# Patient Record
Sex: FEMALE | Race: WHITE | NOT HISPANIC OR LATINO | Employment: UNEMPLOYED | URBAN - METROPOLITAN AREA
[De-identification: names, ages, dates, MRNs, and addresses within clinical notes are randomized per-mention and may not be internally consistent; named-entity substitution may affect disease eponyms.]

---

## 2017-05-12 ENCOUNTER — TRANSFERRED RECORDS (OUTPATIENT)
Dept: HEALTH INFORMATION MANAGEMENT | Facility: CLINIC | Age: 27
End: 2017-05-12

## 2017-09-25 ENCOUNTER — TRANSFERRED RECORDS (OUTPATIENT)
Dept: HEALTH INFORMATION MANAGEMENT | Facility: CLINIC | Age: 27
End: 2017-09-25

## 2017-09-27 ENCOUNTER — TRANSFERRED RECORDS (OUTPATIENT)
Dept: HEALTH INFORMATION MANAGEMENT | Facility: CLINIC | Age: 27
End: 2017-09-27

## 2017-10-03 ENCOUNTER — TRANSFERRED RECORDS (OUTPATIENT)
Dept: HEALTH INFORMATION MANAGEMENT | Facility: CLINIC | Age: 27
End: 2017-10-03

## 2018-02-08 ENCOUNTER — TRANSFERRED RECORDS (OUTPATIENT)
Dept: HEALTH INFORMATION MANAGEMENT | Facility: CLINIC | Age: 28
End: 2018-02-08

## 2019-02-05 ENCOUNTER — TRANSFERRED RECORDS (OUTPATIENT)
Dept: HEALTH INFORMATION MANAGEMENT | Facility: CLINIC | Age: 29
End: 2019-02-05

## 2019-02-12 ENCOUNTER — MEDICAL CORRESPONDENCE (OUTPATIENT)
Dept: HEALTH INFORMATION MANAGEMENT | Facility: CLINIC | Age: 29
End: 2019-02-12

## 2019-06-20 ENCOUNTER — OFFICE VISIT (OUTPATIENT)
Dept: NEUROLOGY | Facility: CLINIC | Age: 29
End: 2019-06-20
Payer: COMMERCIAL

## 2019-06-20 ENCOUNTER — ALLIED HEALTH/NURSE VISIT (OUTPATIENT)
Dept: NEUROLOGY | Facility: CLINIC | Age: 29
End: 2019-06-20
Payer: COMMERCIAL

## 2019-06-20 VITALS — SYSTOLIC BLOOD PRESSURE: 143 MMHG | WEIGHT: 162.2 LBS | HEART RATE: 103 BPM | DIASTOLIC BLOOD PRESSURE: 92 MMHG

## 2019-06-20 DIAGNOSIS — G40.109 FOCAL EPILEPSY (H): Primary | ICD-10-CM

## 2019-06-20 RX ORDER — LEVETIRACETAM 500 MG/1
TABLET ORAL
Qty: 90 TABLET | Refills: 3 | Status: SHIPPED | OUTPATIENT
Start: 2019-06-20 | End: 2019-10-07

## 2019-06-20 ASSESSMENT — PAIN SCALES - GENERAL: PAINLEVEL: NO PAIN (0)
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
## 2019-06-20 NOTE — PROGRESS NOTES
CT: 84106-68  [facility]hr VEEG   [facility]  Dr Don long
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
## 2019-07-11 NOTE — PROCEDURES
Procedure Date: 2019      VIDEO EEG #:  [id number]      DATE OF RECORDIN2019      SOURCE FILE DURATION:  3 hours 1 minute 54 seconds      CLINICAL SUMMARY:  The patient is a 29-year-old female with a history of alcohol abuse who was referred for evaluation of seizure-like activities.  Video EEG was performed to evaluate for seizures.     TECHNICAL SUMMARY: This continuous video- EEG monitoring procedure was performed with 23 scalp electrodes in 10-20 electrode system placement, and additional scalp, precordial and other surface electrodes used for electrical referencing and artifact detection.  Video monitoring was utilized and periodically reviewed by EEG technologists and the physician for electroclinical correlations.     INTERICTAL ACTIVITY:  During maximal wakefulness, there was 9 Hz alpha activity over the posterior head regions, which was nonsustained and was mixed with a lot of theta slowing.  During waking, there was excessive focal polymorphic delta slowing over the left temporal region, which at times was sharply contoured.  Rare sharp transients were also seen over the left temporal region.  Stage II sleep was manifested as vertex waves and symmetric sleep spindles.      ACTIVATION MANEUVERS:  Photic stimulation did not induce an abnormal activity on the EEG.  Hyperventilation induced moderate slowing of the background.      CLINICAL/ICTAL EVENTS:  No electrographic seizures or target clinical events were recorded.      IMPRESSION:  This is an abnormal video EEG due to the presence of diffuse theta slowing during waking and focal left temporal delta slowing, which at times was sharply contoured, and rare poorly formed sharp transients over the left temporal head region, which indicated mild diffuse encephalopathy with an additional structural abnormality over the left temporal region.  Sharp transients were poorly formed, but could possibly be epileptogenic in the right clinical setting.   Clinical correlation advised.         BALJINDER CORONADO MD             D: 2019   T: 2019   MT: [id number]      Name:     ANTWON CLIFTON   MRN:      [id number]        Account:        [id number]   :      1990           Procedure Date: 2019      Document: [id number]

## 2019-07-11 NOTE — PROCEDURES
Procedure Date: 2019      VIDEO EEG:  [id number]      DATE OF RECORDIN2019         BALJINDER CORONADO MD             D: 2019   T: 2019   MT: sosa      Name:     ANTWON CLIFTON   MRN:      [id number]        Account:        [id number]   :      1990           Procedure Date: 2019      Document: [id number]

## 2019-10-07 DIAGNOSIS — G40.109 FOCAL EPILEPSY (H): ICD-10-CM

## 2019-10-07 NOTE — TELEPHONE ENCOUNTER
Rx Authorization:    Requested Medication/ Dose levETIRAcetam (KEPPRA) 500 MG tablet    Date last refill ordered: 06/20/19    Quantity ordered: 90    # refills: 3    Date of last clinic visit with ordering provider: 06/20/19    Date of next clinic visit with ordering provider: None Scheduled     All pertinent protocol data (lab date/result):     Include pertinent information from patients message:

## 2019-10-08 RX ORDER — LEVETIRACETAM 500 MG/1
TABLET ORAL
Qty: 90 TABLET | Refills: 0 | Status: ON HOLD | OUTPATIENT
Start: 2019-10-08 | End: 2020-01-20

## 2020-01-13 ENCOUNTER — PATIENT OUTREACH (OUTPATIENT)
Dept: NEUROLOGY | Facility: CLINIC | Age: 30
End: 2020-01-13

## 2020-01-13 NOTE — TELEPHONE ENCOUNTER
"Pre VEEG admission call    Per MD note  \"ASSESSMENT:  A 29-year-old, right-handed woman with a history of seizures, probable focal seizures without and with secondary generalization. Has an aura of weird smell, sometimes followed by confusion and rare convulsions\"    Patient was last seen in June 2019, at which time an inpatient VEEG monitoring session was ordered.  Patient is to be admitted for seizure characterization and diagnostic clarification.    Call placed to patient.  She is aware of the admission, and is planning on the admission  She has watched the admission DVD. She understands this could be 7 days or longer (or shorter)    We reviewed some of the routines, including that she will need assistance of a hospital staff member whenever she is not in bed, or in a sturdy chair  I explained that she will have staff accompany her to the bathroom,k and stay with her while she is in the bathroom. We discussed that it is okay for her to ask for female staff if this makes her more comfortable.    Patient denies nicotine or tobacco use.     will provide transportation    No other questions at this time    Patient was instructed to call if she has questions or concerns, or if there are any issues attending the appointment on time      "

## 2020-01-15 ENCOUNTER — HOSPITAL ENCOUNTER (INPATIENT)
Facility: CLINIC | Age: 30
LOS: 5 days | Discharge: HOME | End: 2020-01-20
Attending: PSYCHIATRY & NEUROLOGY | Admitting: PSYCHIATRY & NEUROLOGY
Payer: COMMERCIAL

## 2020-01-15 ENCOUNTER — ALLIED HEALTH/NURSE VISIT (OUTPATIENT)
Dept: NEUROLOGY | Facility: CLINIC | Age: 30
End: 2020-01-15
Attending: PSYCHIATRY & NEUROLOGY
Payer: COMMERCIAL

## 2020-01-15 DIAGNOSIS — G40.109 FOCAL EPILEPSY (H): Primary | ICD-10-CM

## 2020-01-15 DIAGNOSIS — F10.988 ALCOHOL USE, UNSPECIFIED WITH OTHER ALCOHOL-INDUCED DISORDER (H): Primary | ICD-10-CM

## 2020-01-15 DIAGNOSIS — G40.109 FOCAL EPILEPSY (H): ICD-10-CM

## 2020-01-15 PROBLEM — G40.909 EPILEPSY (H): Status: ACTIVE | Noted: 2020-01-15

## 2020-01-15 LAB
ALBUMIN SERPL-MCNC: 2.6 G/DL (ref 3.4–5)
ALP SERPL-CCNC: 230 U/L (ref 40–150)
ALT SERPL W P-5'-P-CCNC: 72 U/L (ref 0–50)
ANION GAP SERPL CALCULATED.3IONS-SCNC: 9 MMOL/L (ref 3–14)
AST SERPL W P-5'-P-CCNC: 264 U/L (ref 0–45)
B-HCG SERPL-ACNC: <1 IU/L (ref 0–5)
BILIRUB SERPL-MCNC: 1.6 MG/DL (ref 0.2–1.3)
BUN SERPL-MCNC: 4 MG/DL (ref 7–30)
CALCIUM SERPL-MCNC: 7.8 MG/DL (ref 8.5–10.1)
CHLORIDE SERPL-SCNC: 101 MMOL/L (ref 94–109)
CO2 SERPL-SCNC: 24 MMOL/L (ref 20–32)
CREAT SERPL-MCNC: 0.37 MG/DL (ref 0.52–1.04)
ERYTHROCYTE [DISTWIDTH] IN BLOOD BY AUTOMATED COUNT: 14.3 % (ref 10–15)
GFR SERPL CREATININE-BSD FRML MDRD: >90 ML/MIN/{1.73_M2}
GLUCOSE SERPL-MCNC: 87 MG/DL (ref 70–99)
HCT VFR BLD AUTO: 29.5 % (ref 35–47)
HGB BLD-MCNC: 9.8 G/DL (ref 11.7–15.7)
INR PPP: 1.63 (ref 0.86–1.14)
MCH RBC QN AUTO: 37.4 PG (ref 26.5–33)
MCHC RBC AUTO-ENTMCNC: 33.2 G/DL (ref 31.5–36.5)
MCV RBC AUTO: 113 FL (ref 78–100)
PLATELET # BLD AUTO: 150 10E9/L (ref 150–450)
POTASSIUM SERPL-SCNC: 3.1 MMOL/L (ref 3.4–5.3)
PROT SERPL-MCNC: 6.5 G/DL (ref 6.8–8.8)
RBC # BLD AUTO: 2.62 10E12/L (ref 3.8–5.2)
SODIUM SERPL-SCNC: 134 MMOL/L (ref 133–144)
WBC # BLD AUTO: 7.2 10E9/L (ref 4–11)

## 2020-01-15 PROCEDURE — 12000012 ZZH R&B MS OVERFLOW UMMC

## 2020-01-15 PROCEDURE — 80053 COMPREHEN METABOLIC PANEL: CPT | Performed by: PHYSICIAN ASSISTANT

## 2020-01-15 PROCEDURE — 99222 1ST HOSP IP/OBS MODERATE 55: CPT | Performed by: STUDENT IN AN ORGANIZED HEALTH CARE EDUCATION/TRAINING PROGRAM

## 2020-01-15 PROCEDURE — 95714 VEEG EA 12-26 HR UNMNTR: CPT | Mod: ZF

## 2020-01-15 PROCEDURE — 99207 ZZC CONSULT E&M CHANGED TO INITIAL LEVEL: CPT | Performed by: STUDENT IN AN ORGANIZED HEALTH CARE EDUCATION/TRAINING PROGRAM

## 2020-01-15 PROCEDURE — 25000132 ZZH RX MED GY IP 250 OP 250 PS 637: Performed by: PHYSICIAN ASSISTANT

## 2020-01-15 PROCEDURE — 36415 COLL VENOUS BLD VENIPUNCTURE: CPT | Performed by: PHYSICIAN ASSISTANT

## 2020-01-15 PROCEDURE — 40000556 ZZH STATISTIC PERIPHERAL IV START W US GUIDANCE

## 2020-01-15 PROCEDURE — 85027 COMPLETE CBC AUTOMATED: CPT | Performed by: PHYSICIAN ASSISTANT

## 2020-01-15 PROCEDURE — 84702 CHORIONIC GONADOTROPIN TEST: CPT | Performed by: PHYSICIAN ASSISTANT

## 2020-01-15 PROCEDURE — 85610 PROTHROMBIN TIME: CPT | Performed by: PHYSICIAN ASSISTANT

## 2020-01-15 PROCEDURE — HZ2ZZZZ DETOXIFICATION SERVICES FOR SUBSTANCE ABUSE TREATMENT: ICD-10-PCS | Performed by: STUDENT IN AN ORGANIZED HEALTH CARE EDUCATION/TRAINING PROGRAM

## 2020-01-15 PROCEDURE — 80177 DRUG SCRN QUAN LEVETIRACETAM: CPT | Performed by: PHYSICIAN ASSISTANT

## 2020-01-15 RX ORDER — LIDOCAINE 40 MG/G
CREAM TOPICAL
Status: DISCONTINUED | OUTPATIENT
Start: 2020-01-15 | End: 2020-01-20 | Stop reason: HOSPADM

## 2020-01-15 RX ORDER — LORAZEPAM 1 MG/1
1 TABLET ORAL EVERY 6 HOURS PRN
COMMUNITY

## 2020-01-15 RX ORDER — LANOLIN ALCOHOL/MO/W.PET/CERES
100 CREAM (GRAM) TOPICAL DAILY
Status: DISCONTINUED | OUTPATIENT
Start: 2020-01-16 | End: 2020-01-20 | Stop reason: HOSPADM

## 2020-01-15 RX ORDER — POTASSIUM CHLORIDE 1.5 G/1.58G
20-40 POWDER, FOR SOLUTION ORAL
Status: DISCONTINUED | OUTPATIENT
Start: 2020-01-15 | End: 2020-01-18

## 2020-01-15 RX ORDER — POTASSIUM CHLORIDE 750 MG/1
20-40 TABLET, EXTENDED RELEASE ORAL
Status: DISCONTINUED | OUTPATIENT
Start: 2020-01-15 | End: 2020-01-18

## 2020-01-15 RX ORDER — LORAZEPAM 2 MG/ML
2 INJECTION INTRAMUSCULAR
Status: DISCONTINUED | OUTPATIENT
Start: 2020-01-15 | End: 2020-01-20 | Stop reason: HOSPADM

## 2020-01-15 RX ORDER — MULTIVITAMIN,THERAPEUTIC
1 TABLET ORAL DAILY
Status: DISCONTINUED | OUTPATIENT
Start: 2020-01-15 | End: 2020-01-20 | Stop reason: HOSPADM

## 2020-01-15 RX ORDER — ACETAMINOPHEN 325 MG/1
650 TABLET ORAL EVERY 4 HOURS PRN
Status: DISCONTINUED | OUTPATIENT
Start: 2020-01-15 | End: 2020-01-16

## 2020-01-15 RX ORDER — DOCUSATE SODIUM 100 MG/1
100 CAPSULE, LIQUID FILLED ORAL 2 TIMES DAILY PRN
Status: DISCONTINUED | OUTPATIENT
Start: 2020-01-15 | End: 2020-01-20 | Stop reason: HOSPADM

## 2020-01-15 RX ORDER — FOLIC ACID 1 MG/1
1 TABLET ORAL DAILY
Status: DISCONTINUED | OUTPATIENT
Start: 2020-01-15 | End: 2020-01-20 | Stop reason: HOSPADM

## 2020-01-15 RX ORDER — LIDOCAINE HYDROCHLORIDE 20 MG/ML
5 SOLUTION OROPHARYNGEAL EVERY 6 HOURS PRN
Status: DISCONTINUED | OUTPATIENT
Start: 2020-01-15 | End: 2020-01-20 | Stop reason: HOSPADM

## 2020-01-15 RX ORDER — GINSENG 100 MG
CAPSULE ORAL 3 TIMES DAILY PRN
Status: DISCONTINUED | OUTPATIENT
Start: 2020-01-15 | End: 2020-01-20 | Stop reason: HOSPADM

## 2020-01-15 RX ORDER — LEVETIRACETAM 500 MG/1
500 TABLET ORAL AT BEDTIME
Status: COMPLETED | OUTPATIENT
Start: 2020-01-15 | End: 2020-01-15

## 2020-01-15 RX ADMIN — THERA TABS 1 TABLET: TAB at 15:55

## 2020-01-15 RX ADMIN — POTASSIUM CHLORIDE 20 MEQ: 750 TABLET, EXTENDED RELEASE ORAL at 22:02

## 2020-01-15 RX ADMIN — POTASSIUM CHLORIDE 40 MEQ: 750 TABLET, EXTENDED RELEASE ORAL at 15:55

## 2020-01-15 RX ADMIN — LEVETIRACETAM 500 MG: 500 TABLET ORAL at 22:02

## 2020-01-15 RX ADMIN — FOLIC ACID 1 MG: 1 TABLET ORAL at 15:55

## 2020-01-15 RX ADMIN — ACETAMINOPHEN 650 MG: 325 TABLET, FILM COATED ORAL at 15:55

## 2020-01-15 ASSESSMENT — PAIN DESCRIPTION - DESCRIPTORS: DESCRIPTORS: ACHING;HEADACHE

## 2020-01-15 ASSESSMENT — ACTIVITIES OF DAILY LIVING (ADL)
ADLS_ACUITY_SCORE: 10
ADLS_ACUITY_SCORE: 10
ADLS_ACUITY_SCORE: 12

## 2020-01-15 NOTE — PROGRESS NOTES
Patient admitted to: 5402   Admitted from: home  Arrived by: personal vehicle  Reason for admission: seizure study  Patient accompanied by: significant other  Belongings: kept with patient  Teaching: oriented to room and monitoring

## 2020-01-15 NOTE — PLAN OF CARE
Vitals stable,alert and oriented x 4,Pupils equal and reactive to light briskly.Moves all extremities equally.Denied numbness or tingling..,Patient seen by provider,orders written.Patient was started on Video EEG  monitoring at 1130.No seizure activity noted or  reported by patient.Up with  standby assist and gait belt.Bed alarm activated  for safety.Patient was instructed to call for help if she  needs to get out of bed..Denied pain or any discomfort.Continue to monitor per plan of care
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
## 2020-01-16 ENCOUNTER — ALLIED HEALTH/NURSE VISIT (OUTPATIENT)
Dept: NEUROLOGY | Facility: CLINIC | Age: 30
End: 2020-01-16
Attending: PSYCHIATRY & NEUROLOGY
Payer: COMMERCIAL

## 2020-01-16 ENCOUNTER — APPOINTMENT (OUTPATIENT)
Dept: ULTRASOUND IMAGING | Facility: CLINIC | Age: 30
End: 2020-01-16
Attending: STUDENT IN AN ORGANIZED HEALTH CARE EDUCATION/TRAINING PROGRAM
Payer: COMMERCIAL

## 2020-01-16 DIAGNOSIS — G40.109 FOCAL EPILEPSY (H): Primary | ICD-10-CM

## 2020-01-16 LAB
ALBUMIN SERPL-MCNC: 2.1 G/DL (ref 3.4–5)
ALP SERPL-CCNC: 197 U/L (ref 40–150)
ALT SERPL W P-5'-P-CCNC: 61 U/L (ref 0–50)
ANION GAP SERPL CALCULATED.3IONS-SCNC: 5 MMOL/L (ref 3–14)
AST SERPL W P-5'-P-CCNC: 220 U/L (ref 0–45)
BILIRUB SERPL-MCNC: 2.2 MG/DL (ref 0.2–1.3)
BUN SERPL-MCNC: 6 MG/DL (ref 7–30)
CALCIUM SERPL-MCNC: 7.2 MG/DL (ref 8.5–10.1)
CHLORIDE SERPL-SCNC: 102 MMOL/L (ref 94–109)
CO2 SERPL-SCNC: 27 MMOL/L (ref 20–32)
CREAT SERPL-MCNC: 0.36 MG/DL (ref 0.52–1.04)
GFR SERPL CREATININE-BSD FRML MDRD: >90 ML/MIN/{1.73_M2}
GLUCOSE SERPL-MCNC: 91 MG/DL (ref 70–99)
INR PPP: 1.85 (ref 0.86–1.14)
LEVETIRACETAM SERPL-MCNC: 17 UG/ML (ref 12–46)
MAGNESIUM SERPL-MCNC: 1.7 MG/DL (ref 1.6–2.3)
POTASSIUM SERPL-SCNC: 3.4 MMOL/L (ref 3.4–5.3)
PROT SERPL-MCNC: 5.7 G/DL (ref 6.8–8.8)
SODIUM SERPL-SCNC: 135 MMOL/L (ref 133–144)

## 2020-01-16 PROCEDURE — 99233 SBSQ HOSP IP/OBS HIGH 50: CPT | Performed by: INTERNAL MEDICINE

## 2020-01-16 PROCEDURE — 85610 PROTHROMBIN TIME: CPT | Performed by: INTERNAL MEDICINE

## 2020-01-16 PROCEDURE — 83735 ASSAY OF MAGNESIUM: CPT | Performed by: INTERNAL MEDICINE

## 2020-01-16 PROCEDURE — 12000012 ZZH R&B MS OVERFLOW UMMC

## 2020-01-16 PROCEDURE — 36415 COLL VENOUS BLD VENIPUNCTURE: CPT | Performed by: NURSE PRACTITIONER

## 2020-01-16 PROCEDURE — 95714 VEEG EA 12-26 HR UNMNTR: CPT | Mod: ZF

## 2020-01-16 PROCEDURE — 76705 ECHO EXAM OF ABDOMEN: CPT

## 2020-01-16 PROCEDURE — 36415 COLL VENOUS BLD VENIPUNCTURE: CPT | Performed by: INTERNAL MEDICINE

## 2020-01-16 PROCEDURE — 25000132 ZZH RX MED GY IP 250 OP 250 PS 637: Performed by: PHYSICIAN ASSISTANT

## 2020-01-16 PROCEDURE — 25000128 H RX IP 250 OP 636: Performed by: NURSE PRACTITIONER

## 2020-01-16 PROCEDURE — 84145 PROCALCITONIN (PCT): CPT | Performed by: NURSE PRACTITIONER

## 2020-01-16 PROCEDURE — 25000132 ZZH RX MED GY IP 250 OP 250 PS 637: Performed by: NURSE PRACTITIONER

## 2020-01-16 PROCEDURE — 25000132 ZZH RX MED GY IP 250 OP 250 PS 637: Performed by: STUDENT IN AN ORGANIZED HEALTH CARE EDUCATION/TRAINING PROGRAM

## 2020-01-16 PROCEDURE — 99207 ZZC APP CREDIT; MD BILLING SHARED VISIT: CPT | Performed by: PHYSICIAN ASSISTANT

## 2020-01-16 PROCEDURE — 86803 HEPATITIS C AB TEST: CPT | Performed by: INTERNAL MEDICINE

## 2020-01-16 PROCEDURE — 87340 HEPATITIS B SURFACE AG IA: CPT | Performed by: INTERNAL MEDICINE

## 2020-01-16 PROCEDURE — 87040 BLOOD CULTURE FOR BACTERIA: CPT | Performed by: NURSE PRACTITIONER

## 2020-01-16 PROCEDURE — 80053 COMPREHEN METABOLIC PANEL: CPT | Performed by: INTERNAL MEDICINE

## 2020-01-16 RX ORDER — PHYTONADIONE 5 MG/1
5 TABLET ORAL ONCE
Status: COMPLETED | OUTPATIENT
Start: 2020-01-16 | End: 2020-01-16

## 2020-01-16 RX ORDER — CEFTRIAXONE 1 G/1
1 INJECTION, POWDER, FOR SOLUTION INTRAMUSCULAR; INTRAVENOUS EVERY 24 HOURS
Status: DISCONTINUED | OUTPATIENT
Start: 2020-01-16 | End: 2020-01-20

## 2020-01-16 RX ORDER — ACETAMINOPHEN 325 MG/1
325 TABLET ORAL EVERY 6 HOURS PRN
Status: DISCONTINUED | OUTPATIENT
Start: 2020-01-16 | End: 2020-01-20 | Stop reason: HOSPADM

## 2020-01-16 RX ADMIN — ACETAMINOPHEN 325 MG: 325 TABLET, FILM COATED ORAL at 23:25

## 2020-01-16 RX ADMIN — OMEPRAZOLE 20 MG: 20 CAPSULE, DELAYED RELEASE ORAL at 17:05

## 2020-01-16 RX ADMIN — THERA TABS 1 TABLET: TAB at 08:53

## 2020-01-16 RX ADMIN — PHYTONADIONE 5 MG: 5 TABLET ORAL at 13:43

## 2020-01-16 RX ADMIN — ACETAMINOPHEN 650 MG: 325 TABLET, FILM COATED ORAL at 08:53

## 2020-01-16 RX ADMIN — CEFTRIAXONE 1 G: 1 INJECTION, POWDER, FOR SOLUTION INTRAMUSCULAR; INTRAVENOUS at 23:25

## 2020-01-16 RX ADMIN — Medication 100 MG: at 08:53

## 2020-01-16 RX ADMIN — FOLIC ACID 1 MG: 1 TABLET ORAL at 08:53

## 2020-01-16 ASSESSMENT — PAIN DESCRIPTION - DESCRIPTORS
DESCRIPTORS: ACHING;DISCOMFORT
DESCRIPTORS: ACHING;SORE
DESCRIPTORS: ACHING

## 2020-01-16 ASSESSMENT — ACTIVITIES OF DAILY LIVING (ADL)
ADLS_ACUITY_SCORE: 10

## 2020-01-16 NOTE — PLAN OF CARE
/68 (BP Location: Right arm)   Pulse 94   Temp 98.5  F (36.9  C) (Oral)   Resp 16   Wt 70.9 kg (156 lb 4.9 oz)   SpO2 96%    VSS, afebrile. AxOx4. Complaint of headache and abdominal pain. Tylenol given x1 with relief. Good motor strength, no numbness/tingling, reactive pupils. EEG monitoring, pt pressed button once while eating dinner due to a weird smell which may be an aura. Pt stated that it went away and no seizure activity noted. Bed alarm activated. Use of gait belt while ambulating. Pt calls appropriately. 60 mEq of K+ replaced, recheck scheduled for 0200. Will have an abdominal scan tomorrow, NPO @ Midnight. Peripheral in R forearm, saline locked. Continue with POC.    Problem: Adult Inpatient Plan of Care  Goal: Plan of Care Review  1/15/2020 2313 by Narayan Giles, RN  Outcome: No Change     Problem: Adult Inpatient Plan of Care  Goal: Patient-Specific Goal (Individualization)  1/15/2020 2313 by Narayan Giles, RN  Outcome: No Change     Problem: Adult Inpatient Plan of Care  Goal: Absence of Hospital-Acquired Illness or Injury  1/15/2020 2313 by Narayan Giles, RN  Outcome: No Change     Problem: Adult Inpatient Plan of Care  Goal: Optimal Comfort and Wellbeing  1/15/2020 2313 by Narayan Giles, RN  Outcome: No Change     Problem: Seizure, Active Management  Goal: Absence of Seizure/Seizure-Related Injury  1/15/2020 2313 by Narayan Giles, RN  Outcome: No Change

## 2020-01-16 NOTE — PLAN OF CARE
. Tmax 100.2. Denies pain and nausea. MSSA score 5. CIWA score 0. Neuro checks normal. No s/s of seizures tonight. Bed alarm on for safety. EEG continues. NPO since midnight for abdominal ultrasound today. No replacements needed this am.     Problem: Adult Inpatient Plan of Care  Goal: Plan of Care Review  1/16/2020 0708 by Elida Packer RN  Outcome: No Change     Problem: Adult Inpatient Plan of Care  Goal: Optimal Comfort and Wellbeing  1/16/2020 0708 by Elida Packer, RN  Outcome: No Change     Problem: Seizure, Active Management  Goal: Absence of Seizure/Seizure-Related Injury  1/16/2020 0708 by Elida Packer, RN  Outcome: No Change

## 2020-01-16 NOTE — PROGRESS NOTES
"CLINICAL NUTRITION SERVICES - ASSESSMENT NOTE     Nutrition Prescription    Malnutrition Status:    Patient does not meet two of the established criteria necessary for diagnosing malnutrition    Recommendations already ordered by Registered Dietitian (RD):  None at this time    Future/additional Recommendations  -Monitor engagement in disordered eating patterns  -Monitor wt trends (consider blinded wts if pt seems upset by this)     REASON FOR ASSESSMENT  Kota Cowan is a/an 29 year old female assessed by the dietitian for Admission Nutrition Risk Screen for reduced oral intake over the last month    Per H&P: \"PMH that is pertinent for alcohol abuse, alcoholic pancreatitis, seizures who was admitted for overnight EEG and was found to have elevated LFTs. Medicine were consulted to assist with work up...Alcohol abuse, currently reports 4 alcoholic beverages a day...Eating disorder (restricting and purging).\"    NUTRITION HISTORY  Pt shares she has struggled with disordered eating behaviors (combination of binging/purging and restrictive eating) since she was a teenager. Over the years, her disordered eating patterns have decreased in frequency but it remains nearly a daily struggle. She shares she reached down to 100 lbs a little over a year ago, but she has been working and gained weight. She feels her healthiest at 135 lbs. Pt shares she likes trout (her  fishes), steak and eggs. She mentioned she likes IPA beer, but did not mention anything about drinking other than this. Pt enjoys salty foods.     CURRENT NUTRITION ORDERS  Diet: Regular  Intake/Tolerance: 50% of two meals since admission per RN flowsheets    LABS  Labs reviewed (1/17 unless otherwise noted)  -K+ 3.1 (L)  -BUN 4 (L) indicative of recent poor protein intake  -Cr 0.37 (L) may be indicative of low LBM  -Mg++ 1.7 (WNL) on 1/16    MEDICATIONS  Medications reviewed  -Folvite  -Thera-vit  -Thiamine 100 mg   -PRN bowel " "meds    ANTHROPOMETRICS  Height: 0 cm (Data Unavailable)  Ht Readings from Last 2 Encounters:   No data found for Ht   Per Care Everywhere:  167.6 cm (5' 6\") 04/25/2019 11:27 AM CDT   Most Recent Weight: 70.9 kg (156 lb 4.9 oz)   UBW: ~145-155 lbs over past year per pt    IBW: 59.1 kg (120% IBW)  BMI: 25.24 kg/m2; Overweight BMI 25-29.9  Weight History: Wt appears fairly stable compared to limited wt history below. Current wt is similar to weights back in April of last year. Pt shares she has gained wt over past year or so (used to weigh 100 lbs).  Wt Readings from Last 20 Encounters:   01/15/20 70.9 kg (156 lb 4.9 oz)   06/20/19 73.6 kg (162 lb 3.2 oz)     Per Care Everywhere:  70.4 kg (155 lb 1.6 oz) 04/25/2019 11:27 AM CDT     68.3 kg (150 lb 8 oz) 04/24/2019 9:37 AM CDT     72.6 kg (160 lb) 04/17/2019 9:23 AM CDT     Dosing Weight: 62 kg (adjusted, based on admit wt of 70.9 kg on 1/15 and IBW of 59.1 kg)    ASSESSED NUTRITION NEEDS  Estimated Energy Needs: 2464-2319 kcals/day (25 - 30 kcals/kg)  Justification: Maintenance  Estimated Protein Needs: 74-93 grams protein/day (1.2 - 1.5 grams of pro/kg)  Justification: Hypercatabolism with acute illness  Estimated Fluid Needs: 1 mL/kcal   Justification: Maintenance or Per provider pending fluid status    PHYSICAL FINDINGS  See malnutrition section below.  -Hair, skin, nails WNL    MALNUTRITION  % Intake: Decreased intake does not meet criteria  % Weight Loss: None noted - pt shares wt has been stable  Subcutaneous Fat Loss: None observed  Muscle Loss: None observed  Fluid Accumulation/Edema: Mild (1+ trace anasarca and 2+ bipedal edema per RN flowsheets)  Malnutrition Diagnosis: Patient does not meet two of the established criteria necessary for diagnosing malnutrition    NUTRITION DIAGNOSIS  Inadequate oral intake related to poor appetite in setting of pain (now improving) and ongoing struggle with disordered eating behaviors as evidenced by pt report of often " struggling with a combination of both binging/purging and restrictive eating behaviors but is overall improved from her teenage years.    INTERVENTIONS  Implementation  Nutrition Education: Provided education on RD role, role of NFPE. Discussed importance of adequate protein intake to maintain muscle, hair, and overall health. Encouraged pt to choose foods she enjoys and avoid triggers for disordered eating behaviors. Pt knows triggers and activities that help avoid disordered eating behaviors.    Goals  Patient to consume % of nutritionally adequate meal trays TID, or the equivalent with supplements/snacks.     Monitoring/Evaluation  Progress toward goals will be monitored and evaluated per protocol.    Rocio Fajardo RD, LD  Pager: [phone]

## 2020-01-16 NOTE — PROGRESS NOTES
[facility], [place]   Epilepsy Service Daily Note      Interval History:   Had one event of weird smell but this did not progress.  No major complaints.     Review of System:   No nausea, No vomiting, no headaches, no dizziness, no chest pain.     Medications:   Antiepileptic Medications Home Doses: levetiracetam 500-1000  Antiepileptic Medications Current Doses: none     Exam: Blood pressure 118/76, pulse 86, temperature 99.4  F (37.4  C), temperature source Oral, resp. rate 18, weight 71.5 kg (157 lb 9.6 oz), SpO2 97 %.   General: NAD  Head: NC/AT  Abdomen: soft, mild tenderness, not significantly distended   Neuro: Alert and oriented. Speech fluent. Hearing intact to normal conversation. Pupils equal, round and reactive to light. EOM's intact. VFF. Face symmetric, tongue midline. Strength 5/5 bilaterally. No drift or pronation. Intact FNF. Sensation grossly intact to light touch.   EEG: no epileptiform discharges   Assessment and Plan: patient seen and discussed with Dr. Canada   1. Patient is a 29 year old, right-handed female with a history of alcohol abuse, alcohol induced pancreatitis and hepatitis as well as anemia who is being admitted for characterization of seizures. One target spell with weird smell but this did not progress. No electrographic seizures yet. Did discuss with medicine team continuing elective vEEG monitoring to record seizure for characterization given other chronic issues and they are comfortable with proceeding. Patient would also like to proceed.     -continue vEEG monitoring  -continue off levetiracetam  -sleep deprive tonight  -continues to decline psychiatry consult or chemical dependency referral at this time       2. Alcohol abuse with abnormal LFTs, increased INR most likely due to alcoholic hepatitis. RUQ ultrasound suggets hepatic steatosis with moderate amount of abdominal ascites. Abdominal pain felt likely consistent with etoh induced gastritis.  Omeprazole BID started.     -continue management per medicine team   -continue MSSA (last alcoholic beverage 1/14)      Ally Lorenzo PA-C    Total time: 25 minute was spent in the care of this patient. The patient agrees with the above mentioned plan of care. I answered all the patient's questions and addressed immediate concerns. More than 50% of time spent consisted of counseling and coordinating care, including discussion of the diagnostic significance of EEG findings, anti-seizure medication management, and planning for discharge home

## 2020-01-16 NOTE — CONSULTS
"[facility], [place]    Internal Medicine Consult - Gold Service       Date of Admission:  1/15/2020  Consult Requested by: Epilepsy   Reason for Consult: elevated LFTs     Assessment & Plan   Kota Cowan is a 29 year old female with PMH that is pertinent for alcohol abuse, alcoholic pancreatitis, seizures who was admitted for overnight EEG and was found to have elevated LFTs. Medicine were consulted to assist with work up.     # Elevated LFTs likely 2/2 Alcoholic hepatitis   AST:ALT ratio consistent with alcoholic hepatitis. Her Maddrey`s coefficient is 28.7 favoring good prognosis . Would do conservative management.     - Obtain RUQ US to r/o other causes   - Trend CMP/INR daily  - Check Magnesium levels   - Start Thiamine , Folate daily for alcohol dependance  - Agree with [facility] protocol   - Discussed with patient medicine for alcohol dependence and assistance from psychiatry. She is refusing at this point \"will do it on my own, do not want extra medicine\". If patient changes her mind and is agreeable would do the following:  * Start Gabapentin 600 mg TID   * Consider Psychiatry , Chemical dependency     #Right Pleural effusion ?   Exam c/w right pleural effusion.   - Obtain CXR     #Hypokalemia   Agree with 40 meq of potassium chloride     #Epilepsy   Per primary team. On Keppra    DVT Prophylaxis: Low Risk/Ambulatory with no VTE prophylaxis indicated    Thank you for this interesting consult. Medicine will continue to follow along in the AM.     Gaurav Senior  Internal Medicine Staff Hospitalist Service  [facility]  Pager: [phone]  After 5 PM, page gold team cross cover at 2021. If no response, page job code [phone].  ______________________________________________________________________    Chief Complaint   Abdominal pain     History is obtained from the patient    History of Present Illness   Kota Cowan is a 29 year old female with PMH that is " "pertinent for alcohol abuse, alcoholic pancreatitis, seizures who was admitted for overnight EEG and was found to have elevated LFTs. Medicine were consulted to assist with work up.     Patient was admitted for a scheduled overnight EEG and was found to have elevated LFTs.  Patient reports that starting last week she started having abdominal pain. Pain is epigastric , radiates to RUQ. Sharp in nature. Associated with alcohol use. Has chronic nausea and had two episodes of NBNB vomiting. Chronic diarrhea with fatty food that is unchanged. No fevers, chills , sick contacts.   She drinks about 1-2 small bottles of hard liquor along with small box of wine and beer daily. She 7/10 motivated to cut down her alcohol use. Her motivation is to be pain free. She was seen by psychiatry before but \"didn't like it\". She has a primary care that she has good relation ship with.     Review of Systems   The 10 point Review of Systems is negative other than noted in the HPI    Past Medical History    I have reviewed this patient's medical history and updated it with pertinent information if needed.   Epilepsy  Alcoholic hepatitis  Alcoholic pancreatitis     Past Surgical History   I have reviewed this patient's surgical history and updated it with pertinent information if needed.  No past surgical history on file.     Social History   Previous smoker.   Alcohol as above. No drug use.    has a boy and a girl , age 7 and 10.  is mild drinker. Supportive of her quitting.     Family History   I have reviewed this patient's family history and updated it with pertinent information if needed.   No family history on file.    Medications   I have reviewed this patient's current medications    Allergies   Allergies   Allergen Reactions     Lamotrigine Other (See Comments)     confusion         Physical Exam   Vital Signs: Temp: 98.5  F (36.9  C) Temp src: Oral BP: 111/68 Pulse: 94   Resp: 16 SpO2: 96 % O2 Device: None (Room air)  "   Weight: 156 lbs 4.9 oz    General Appearance: NAD, AAox3  Eyes: unicteric, well injected  HEENT: NC/AT  Respiratory: decreased air entry at the right base , decreased vocal fremitus otherwise CTAB  Cardiovascular: RRR, no murmurs  GI: soft , non tender, no hepatosplenomegaly   Skin: no rashes  Neurologic: AAox3, CN II-XII intact, no focal deficit  Psychiatric: good affect     Data   Data reviewed today: I reviewed all medications, new labs and imaging results over the last 24 hours. I personally reviewed   K 3.1, Cr 0.37   T.bili 1.6 / Alp 230 / ALT 72 /  / INR 1.63

## 2020-01-16 NOTE — PROCEDURES
Procedure Date: 01/15/2020      EEG #-1       DATE OF RECORDING/SERVICE DATE:  01/15/2020 (Day 1)       SOURCE FILE DURATION:  12 hours, 26 minutes.      CLINICAL HISTORY:  Kota Cowan is a 29-year-old right-handed female with a history of alcohol abuse and seizure disorder.  Her seizures started at the age of 26.  Video EEG is requested for characterization of seizures.      MEDICATIONS:  Levetiracetam 500 mg at bedtime.      TECHNICAL SUMMARY: This video EEG monitoring procedure was performed with 23 scalp electrodes in 10-20 system placements, and additional scalp, precordial and other surface electrodes used for electrical referencing and artifact detection. Additional digital data analyses were performed after recording was completed. The interpreting physician reviewed the data to localize and measure epileptiform electrocerebral potentials, including diploe mapping, as reported below. Topographic analysis revealed that the right fronto-temporal spike wave complexes had surface negative dipolar maxima over the T2, F8, T4. No electroclinical seizures or any electrographic seizures were seen. Video was reviewed intermittently by EEG technologist and physcian for clinical seizures.      FINDINGS:  During quite wakefulness, there was a 10 Hz posterior dominant rhythm that is symmetric and reactive to eye opening/closing.  During wakefulness, there was symmetric, fast frequency beta activity in the frontal derivations.  Well-formed sleep architecture with symmetric sleep spindles and vertex waves were seen.  There are sharp transients in the right frontotemporal region seen in sleep with maximum negativity at F8.  At 18:52, patient felt an aura characterized by weird smell while eating her dinner and pressed the event button.  Clinically, she remained unchanged during this event and carried out eating her dinner.  During this time, there were no EEG changes to suggest seizure activity.        OTHER  ICTAL ABNORMALITIES:  The patient does have epileptiform discharges in the right frontotemporal region seen with maximum negativity at F8/T4, on a reference montage we see maximum negativity was concentrated in electrode  T2, F8, T4.      ICTAL ABNORMALITIES:  At 18:52, patient felt an aura characterized by weird smell while eating her dinner and pressed the event button.  Clinically, on the video continued her baseline activity with had no obvious clinical seizure manifestations. She remained unchanged during this event and continued to eat her dinner.  During this event, there were no EEG changes to suggest seizure activity.      ACTIVATION PROCEDURES:  Photic stimulation and hyperventilation did not induce any abnormal activity on the EEG.      IMPRESSION:  This is an abnormal video EEG due to the presence of right frontotemporal epileptiform discharges with maximum negativity at T2 consistent with focal cortical irritability.  Patient did have 1 event of a characteristic smell that may represent her typical auras.  This was scalp EEG negative.  Certainly this may be a focal unimpaired scalp EEG negative seizure.  More data would be helpful. No clear electro-clinical EEG seizures were seen.     I agree with the findings as reported. I personally reviewed this EEG recording and made edits to this report as needed.     Melinda Canada MD   Epilepsy Attending           As dictated by MARLENA QUAN MD            D: 2020   T: 2020   MT: STANLEY      Name:     ANTWON CLIFTON   MRN:      [id number]        Account:        [id number]   :      1990           Procedure Date: 01/15/2020      Document: [id number]

## 2020-01-16 NOTE — PROGRESS NOTES
[facility], [place]    Medicine Progress Note - Hospitalist Service, Gold 7       Date of Admission:  1/15/2020  Assessment & Plan    Kota is a 30 yo female with hx of etoh abuse, etoh induced pancreatitis, and recurrent sz's admitted to Neurology service to undergo characterization of her seizures. Internal medicine consulted to eval pt's c/o acute abd pain within context of elevated LFTs and recent etoh abuse.     # Acute on chronic, recurrent abd pain  # Acute on chronic severe etoh abuse  # Abnormal LFTs most likely 2/2 alcoholic hepatitis  # Elevated INR likely 2/2 either etoh liver disease induced coagulopathy vs poor nutrition   States abd pain has persisted over the past week within the context of heavy daily etoh use, last used this past Tues. States has had recurrent pain for a long time and usually occurs with etoh use. Drinking heavily at least since age 21. LFTs noted to be elevated in chart back to 2017. LFTs since this admission: Total bili 2.2 (1.6), Alk phos 220 (264), ALT 61 (72),  (264). INR 1.85 (1.63). RUQ abd US yesterday with echogenic liver most likely due to hepatic steatosis. Also revealed mod amt of ascites but pt denies hx of ascites or houston. Currently denies assoc hematemesis, melena, hematochezia or coffee ground emesis. Does have frequent heartburn but denies treatment. Abd exam reveals minimal distention. Abd pain most likely consistent with etoh induced gastritis. Declines wanting referral to CD, but agreeable to GI referral.  MELD-Na score: 19 at 1/16/2020  1:32 AM  MELD score: 17 at 1/16/2020  1:32 AM  Calculated from:  Serum Creatinine: 0.36 mg/dL (Rounded to 1 mg/dL) at 1/16/2020  1:32 AM  Serum Sodium: 135 mmol/L at 1/16/2020  1:32 AM  Total Bilirubin: 2.2 mg/dL at 1/16/2020  1:32 AM  INR(ratio): 1.85 at 1/16/2020  1:32 AM  Age: 29 years     - Strongly encourage etoh cessation after discharge.   - Start CIWA protocol if pt develops s/s of  etoh withdrawal.  - Start BID PPI   - Repeat hepatic panel and INR tomorrow am; add-on to labs already drawn Hep A, B, and C screen   - Consult CAPS team to eval for diagnostic and therapuetic paracentesis.  - Give pt one time dose of oral Vitamin K  - Continue with oral thiamine and folate supplementation  - GI referral after discharge.   - Follow up with PCP after discharge to discuss possibly entering OP CD tx program and/or starting medication to decrease etoh cravings.     # Hx of recurrent seizures: Management per primary team.      The patient's care was discussed with the Attending Physician, Dr. Wilkins, Bedside Nurse, Patient and Primary team.    Wilmer Mendes PA-C  Hospitalist Service, [address], [place]  Pager: [phone]  Please see sticky note for cross cover information  ______________________________________________________________________    Interval History   No acute events overnight. Still mild epigastric abd pain. Denies nausea, but did not eat breakfast this am due to pain. Denies fever, chills, chest pain, SOB, bowel and bladder concerns including melena.     Data reviewed today: I reviewed all medications, new labs and imaging results over the last 24 hours.     Physical Exam   Vital Signs: Temp: 98.6  F (37  C) Temp src: Axillary BP: 115/71 Pulse: 86 Heart Rate: 101 Resp: 16 SpO2: 94 % O2 Device: None (Room air)    Weight: 157 lbs 9.6 oz  GEN: In NAD  HEENT: NCAT; PERRL; sclerae non-icteric  LUNGS: CTAB  CV: RRR  ABD: +BSs; mildly tender over epigastrium without guarding.   EXT: No BLE edema  SKIN: No acute rashes noted on exposed areas.  NEURO: AAOx3; CNs grossly intact; No acute focal deficits noted.      Data   Recent Results (from the past 24 hour(s))   US Abdomen Limited    Narrative    EXAMINATION: Limited Abdominal Ultrasound, 1/16/2020 8:14 AM     COMPARISON: None.    HISTORY: 29-year-old female with high LFTs and bili.    Additional history:  history of alcohol abuse, alcohol induced  pancreatitis and hepatitis as well as anemia.?    FINDINGS:   Fluid: Moderate amount of ascites in the right upper quadrant.    Liver: Echogenic hepatic parenchyma, measuring 15.5 cm in craniocaudal  dimension. There is no focal mass.     Gallbladder: There is no wall thickening, pericholecystic fluid,  positive sonographic Gallardo's sign or evidence for cholelithiasis.    Bile Ducts: Both the intra- and extrahepatic biliary system are of  normal caliber. The common bile duct is not well-visualized.    Pancreas: Not visualized. There is a round, avascular, anechoic cystic  structure with increased through transmission adjacent to the pancreas  that does not communicate with the perihepatic fluid collection or  stomach and possibly arises from the pancreas.    Kidney: The right kidney measures 10.7 cm long. There is no  hydronephrosis or hydroureter, no shadowing renal calculi, cystic  lesion or mass.       Impression    IMPRESSION:   1.  Echogenic liver appearance suggesting hepatic steatosis. Moderate  amount of abdominal ascites. No acute gallbladder pathology  appreciated.  2.  The pancreas is not well-visualized.   3. Benign appearing anechoic cystic structure adjacent to the  pancreas.   May represent a pseudocyst.      I have personally reviewed the examination and initial interpretation  and I agree with the findings.    LISETTE DIAZ MD     CMP  Recent Labs   Lab 01/16/20  0132 01/15/20  1113    134   POTASSIUM 3.4 3.1*   CHLORIDE 102 101   CO2 27 24   ANIONGAP 5 9   GLC 91 87   BUN 6* 4*   CR 0.36* 0.37*   GFRESTIMATED >90 >90   GFRESTBLACK >90 >90   SAMIRA 7.2* 7.8*   MAG 1.7  --    PROTTOTAL 5.7* 6.5*   ALBUMIN 2.1* 2.6*   BILITOTAL 2.2* 1.6*   ALKPHOS 197* 230*   * 264*   ALT 61* 72*     CBC  Recent Labs   Lab 01/15/20  1113   WBC 7.2   RBC 2.62*   HGB 9.8*   HCT 29.5*   *   MCH 37.4*   MCHC 33.2   RDW 14.3        INR  Recent Labs    Lab 01/16/20  0132 01/15/20  1502   INR 1.85* 1.63*

## 2020-01-17 ENCOUNTER — ALLIED HEALTH/NURSE VISIT (OUTPATIENT)
Dept: NEUROLOGY | Facility: CLINIC | Age: 30
End: 2020-01-17
Attending: PSYCHIATRY & NEUROLOGY
Payer: COMMERCIAL

## 2020-01-17 ENCOUNTER — APPOINTMENT (OUTPATIENT)
Dept: GENERAL RADIOLOGY | Facility: CLINIC | Age: 30
End: 2020-01-17
Attending: STUDENT IN AN ORGANIZED HEALTH CARE EDUCATION/TRAINING PROGRAM
Payer: COMMERCIAL

## 2020-01-17 ENCOUNTER — ANCILLARY PROCEDURE (OUTPATIENT)
Dept: ULTRASOUND IMAGING | Facility: CLINIC | Age: 30
End: 2020-01-17
Attending: PEDIATRICS
Payer: COMMERCIAL

## 2020-01-17 DIAGNOSIS — R56.9 CONVULSIONS, UNSPECIFIED CONVULSION TYPE (H): Primary | ICD-10-CM

## 2020-01-17 LAB
ALBUMIN FLD-MCNC: 0.9 G/DL
ALBUMIN SERPL-MCNC: 2 G/DL (ref 3.4–5)
ALBUMIN UR-MCNC: 100 MG/DL
ALP SERPL-CCNC: 175 U/L (ref 40–150)
ALT SERPL W P-5'-P-CCNC: 51 U/L (ref 0–50)
APPEARANCE FLD: NORMAL
APPEARANCE UR: CLEAR
AST SERPL W P-5'-P-CCNC: 170 U/L (ref 0–45)
BILIRUB DIRECT SERPL-MCNC: 1.5 MG/DL (ref 0–0.2)
BILIRUB SERPL-MCNC: 2.3 MG/DL (ref 0.2–1.3)
BILIRUB UR QL STRIP: ABNORMAL
COLOR FLD: NORMAL
COLOR UR AUTO: ABNORMAL
EOSINOPHIL NFR FLD MANUAL: 1 %
GLUCOSE UR STRIP-MCNC: NEGATIVE MG/DL
GRAM STN SPEC: NORMAL
HAV IGM SERPL QL IA: NONREACTIVE
HBV SURFACE AG SERPL QL IA: NONREACTIVE
HCV AB SERPL QL IA: NONREACTIVE
HGB UR QL STRIP: ABNORMAL
INR PPP: 2.03 (ref 0.86–1.14)
KETONES UR STRIP-MCNC: NEGATIVE MG/DL
LEUKOCYTE ESTERASE UR QL STRIP: ABNORMAL
LYMPHOCYTES NFR FLD MANUAL: 10 %
MUCOUS THREADS #/AREA URNS LPF: PRESENT /LPF
NEUTS BAND NFR FLD MANUAL: 10 %
NITRATE UR QL: NEGATIVE
OTHER CELLS FLD MANUAL: 79 %
PH UR STRIP: 6.5 PH (ref 5–7)
PROCALCITONIN SERPL-MCNC: 7.47 NG/ML
PROT FLD-MCNC: 2.1 G/DL
PROT SERPL-MCNC: 5.3 G/DL (ref 6.8–8.8)
RBC #/AREA URNS AUTO: >182 /HPF (ref 0–2)
SOURCE: ABNORMAL
SP GR UR STRIP: 1.02 (ref 1–1.03)
SPECIMEN SOURCE FLD: NORMAL
SPECIMEN SOURCE: NORMAL
SQUAMOUS #/AREA URNS AUTO: <1 /HPF (ref 0–1)
TRANS CELLS #/AREA URNS HPF: <1 /HPF (ref 0–1)
UROBILINOGEN UR STRIP-MCNC: 2 MG/DL (ref 0–2)
WBC # FLD AUTO: 1154 /UL
WBC #/AREA URNS AUTO: 4 /HPF (ref 0–5)

## 2020-01-17 PROCEDURE — 87205 SMEAR GRAM STAIN: CPT | Performed by: PHYSICIAN ASSISTANT

## 2020-01-17 PROCEDURE — 82042 OTHER SOURCE ALBUMIN QUAN EA: CPT | Performed by: PHYSICIAN ASSISTANT

## 2020-01-17 PROCEDURE — 84157 ASSAY OF PROTEIN OTHER: CPT | Performed by: PHYSICIAN ASSISTANT

## 2020-01-17 PROCEDURE — 99233 SBSQ HOSP IP/OBS HIGH 50: CPT | Performed by: INTERNAL MEDICINE

## 2020-01-17 PROCEDURE — 87040 BLOOD CULTURE FOR BACTERIA: CPT | Performed by: STUDENT IN AN ORGANIZED HEALTH CARE EDUCATION/TRAINING PROGRAM

## 2020-01-17 PROCEDURE — 25000128 H RX IP 250 OP 636: Performed by: NURSE PRACTITIONER

## 2020-01-17 PROCEDURE — 95714 VEEG EA 12-26 HR UNMNTR: CPT | Mod: ZF

## 2020-01-17 PROCEDURE — 81001 URINALYSIS AUTO W/SCOPE: CPT | Performed by: STUDENT IN AN ORGANIZED HEALTH CARE EDUCATION/TRAINING PROGRAM

## 2020-01-17 PROCEDURE — 85610 PROTHROMBIN TIME: CPT | Performed by: PHYSICIAN ASSISTANT

## 2020-01-17 PROCEDURE — 86709 HEPATITIS A IGM ANTIBODY: CPT | Performed by: PHYSICIAN ASSISTANT

## 2020-01-17 PROCEDURE — 25000132 ZZH RX MED GY IP 250 OP 250 PS 637: Performed by: PHYSICIAN ASSISTANT

## 2020-01-17 PROCEDURE — 25000132 ZZH RX MED GY IP 250 OP 250 PS 637: Performed by: NURSE PRACTITIONER

## 2020-01-17 PROCEDURE — 87070 CULTURE OTHR SPECIMN AEROBIC: CPT | Performed by: PHYSICIAN ASSISTANT

## 2020-01-17 PROCEDURE — 49083 ABD PARACENTESIS W/IMAGING: CPT | Performed by: PEDIATRICS

## 2020-01-17 PROCEDURE — 25000132 ZZH RX MED GY IP 250 OP 250 PS 637: Performed by: STUDENT IN AN ORGANIZED HEALTH CARE EDUCATION/TRAINING PROGRAM

## 2020-01-17 PROCEDURE — 99207 ZZC NON-BILLABLE SERV PER CHARTING: CPT | Performed by: INTERNAL MEDICINE

## 2020-01-17 PROCEDURE — 71045 X-RAY EXAM CHEST 1 VIEW: CPT

## 2020-01-17 PROCEDURE — 0W9G3ZX DRAINAGE OF PERITONEAL CAVITY, PERCUTANEOUS APPROACH, DIAGNOSTIC: ICD-10-PCS | Performed by: PEDIATRICS

## 2020-01-17 PROCEDURE — 80076 HEPATIC FUNCTION PANEL: CPT | Performed by: PHYSICIAN ASSISTANT

## 2020-01-17 PROCEDURE — 36415 COLL VENOUS BLD VENIPUNCTURE: CPT | Performed by: STUDENT IN AN ORGANIZED HEALTH CARE EDUCATION/TRAINING PROGRAM

## 2020-01-17 PROCEDURE — 36415 COLL VENOUS BLD VENIPUNCTURE: CPT | Performed by: PHYSICIAN ASSISTANT

## 2020-01-17 PROCEDURE — 12000012 ZZH R&B MS OVERFLOW UMMC

## 2020-01-17 PROCEDURE — 89051 BODY FLUID CELL COUNT: CPT | Performed by: PHYSICIAN ASSISTANT

## 2020-01-17 RX ADMIN — ACETAMINOPHEN 325 MG: 325 TABLET, FILM COATED ORAL at 19:52

## 2020-01-17 RX ADMIN — OMEPRAZOLE 20 MG: 20 CAPSULE, DELAYED RELEASE ORAL at 16:15

## 2020-01-17 RX ADMIN — FOLIC ACID 1 MG: 1 TABLET ORAL at 09:00

## 2020-01-17 RX ADMIN — Medication 100 MG: at 09:00

## 2020-01-17 RX ADMIN — CEFTRIAXONE 1 G: 1 INJECTION, POWDER, FOR SOLUTION INTRAMUSCULAR; INTRAVENOUS at 23:21

## 2020-01-17 RX ADMIN — THERA TABS 1 TABLET: TAB at 09:00

## 2020-01-17 RX ADMIN — OMEPRAZOLE 20 MG: 20 CAPSULE, DELAYED RELEASE ORAL at 09:00

## 2020-01-17 RX ADMIN — ACETAMINOPHEN 325 MG: 325 TABLET, FILM COATED ORAL at 12:34

## 2020-01-17 ASSESSMENT — ACTIVITIES OF DAILY LIVING (ADL)
ADLS_ACUITY_SCORE: 10

## 2020-01-17 ASSESSMENT — PAIN DESCRIPTION - DESCRIPTORS
DESCRIPTORS: HEADACHE
DESCRIPTORS: ACHING

## 2020-01-17 NOTE — PROGRESS NOTES
Medicine cross cover note -    Called regarding low-grade temperatures and increased abdominal pain.  Per chart review the patient has a history of what appears to be alcoholic hepatitis versus cirrhosis and has ascites on imaging.    -Will obtain blood cultures x2  -Will obtain procalcitonin  -Will start ceftriaxone now for potential spontaneous bacterial peritonitis pending diagnostic paracentesis in the morning.  If diagnosis confirmed, medicine team will need to order albumin.

## 2020-01-17 NOTE — PROGRESS NOTES
[facility], [place]    Medicine Progress Note - Hospitalist Service, Gold 7       Date of Admission:  1/15/2020  Assessment & Plan    Kota is a 30 yo female with hx of etoh abuse, etoh induced pancreatitis, and recurrent sz's admitted to Neurology service to undergo characterization of her seizures. Internal medicine consulted to eval pt's c/o acute abd pain within context of elevated LFTs and recent etoh abuse.     # Acute on chronic, recurrent abd pain  # Acute on chronic severe etoh abuse  # Abnormal LFTs most likely 2/2 alcoholic hepatitis  # Elevated INR most likely 2/2 either etoh liver disease induced coagulopathy   Admitted with abd pain that persisted over the past week within the context of heavy daily etoh use, last used 1/14. States has had recurrent pain for a long time and usually occurs with etoh use. Drinking heavily at least since age 21. LFTs noted to be elevated in chart back to 2017. Denies assoc hematemesis, melena, hematochezia or coffee ground emesis. Does have frequent heartburn but denies treatment. RUQ abd US 1/15 with echogenic liver most likely due to hepatic steatosis. Also revealed mod amt of ascites but pt denies hx of ascites or [place]. Abd exam reveals minimal distention. Abd pain most likely consistent with etoh induced gastritis. Declines wanting referral to CD, but agreeable to GI referral. Most recent LFTs since this admission: Total bili 2.3 (2.2), Alk phos 175 (220), ALT 51 (61),  (220). INR increased to 2.03 (1.85) despite giving pt one time dose (5mg) of Vit K yesterday. Developed fever last night with procal ~7 concerning for SBP and pt started empirically on ceftriaxone. Underwent para this am with 1154 WBC but PMN count 115. Fib-4 score 4.6.   MELD-Na score: 19 at 1/17/2020  4:56 AM  MELD score: 17 at 1/17/2020  4:56 AM  Calculated from:  Serum Creatinine: 0.36 mg/dL (Rounded to 1 mg/dL) at 1/16/2020  1:32 AM  Serum Sodium: 135 mmol/L  at 1/16/2020  1:32 AM  Total Bilirubin: 2.3 mg/dL at 1/17/2020  4:56 AM  INR(ratio): 2.03 at 1/17/2020  4:56 AM  Age: 29 years     - Consult GI and appreciate recommendations.   - Continue with empiric ceftriaxone and follow up results of para cultures.   - Follow up results of viral hepatitis serologies   - Strongly encourage etoh cessation after discharge.   - Continue CIWA protocol if pt develops s/s of etoh withdrawal.  - Continue BID PPI   - Repeat hepatic panel and INR tomorrow am  - Continue with oral thiamine and folate supplementation  - GI referral after discharge (GI has given pt a GI specialist in [place] close to where she lives).   - Follow up with PCP after discharge to discuss possibly entering OP CD tx program and/or starting medication to decrease etoh cravings.     # Hx of recurrent seizures: Management per primary team.      The patient's care was discussed with the Attending Physician, Dr. Wilkins, Bedside Nurse, Patient and Primary team.    Wilmer Mendes PA-C  Hospitalist Service, [address], [place]  Pager: [phone]  Please see sticky note for cross cover information  ______________________________________________________________________    Interval History   Developed fever up to 101F last night with subsequently ordered procal ~7. Started empirically on ceftriaxone for SBP. States abd pain persists but improved from yesterday. Denies fever, chills, chest pain and other acute physical concerns at this time.     Data reviewed today: I reviewed all medications, new labs and imaging results over the last 24 hours.     Physical Exam   Vital Signs: Temp: 99.7  F (37.6  C) Temp src: Oral BP: 114/75 Pulse: 114 Heart Rate: 114 Resp: 16 SpO2: 95 % O2 Device: None (Room air)    Weight: 172 lbs 12.8 oz  GEN: In NAD  HEENT: NCAT; PERRL; sclerae non-icteric  LUNGS: CTAB  CV: RRR  ABD: +BSs; mildly tender over epigastrium without guarding.   EXT: No BLE  edema  SKIN: No acute rashes noted on exposed areas.  NEURO: AAOx3; CNs grossly intact; No acute focal deficits noted.      Data   No results found for this or any previous visit (from the past 24 hour(s)).  CMP  Recent Labs   Lab 01/17/20 0456 01/16/20  0132 01/15/20  1113   NA  --  135 134   POTASSIUM  --  3.4 3.1*   CHLORIDE  --  102 101   CO2  --  27 24   ANIONGAP  --  5 9   GLC  --  91 87   BUN  --  6* 4*   CR  --  0.36* 0.37*   GFRESTIMATED  --  >90 >90   GFRESTBLACK  --  >90 >90   SAMIRA  --  7.2* 7.8*   MAG  --  1.7  --    PROTTOTAL 5.3* 5.7* 6.5*   ALBUMIN 2.0* 2.1* 2.6*   BILITOTAL 2.3* 2.2* 1.6*   ALKPHOS 175* 197* 230*   * 220* 264*   ALT 51* 61* 72*     CBC  Recent Labs   Lab 01/15/20  1113   WBC 7.2   RBC 2.62*   HGB 9.8*   HCT 29.5*   *   MCH 37.4*   MCHC 33.2   RDW 14.3        INR  Recent Labs   Lab 01/17/20  0456 01/16/20  0132 01/15/20  1502   INR 2.03* 1.85* 1.63*

## 2020-01-17 NOTE — PROCEDURES
Procedure Note:    Procedure Name: Diagnostic paracentesis  Procedurist (primary): Dayron  Pre-procedure diagnosis: ascites  Post-procedure diagnosis: ascites  Indication: fever, new ascites, possible SBP    Consent was obtained from pt and placed in the chart.   Ultrasound used to locate ascites; optimal pocket found to be in RLQ, and marked.  Universal protocol performed with nursing.   3ml of 1% lidocaine used anesthetize with 25 gauge needle.   Fluid obtained with 25 gauge needle appeared orange (consistent throughout procedure without red flash, did not clear), and was sent to lab.   Patient tolerated without any apparent complications.    Damon Padgett MD  [facility] Hospitalist

## 2020-01-17 NOTE — PLAN OF CARE
Febrile, tmax 101. MD notified-BC x2, IV abx started and tylenol 325 mg given. Tachy 110's. OVSS. Continues to have some abdominal discomfort and feeling of fullness. Planning for possible paracentesis. Denies n/v/d. vEEG monitoring in place, no seizure activity noted overnight. Sleep deprivation, patient stayed up until 0200 this morning, 4 hrs of sleep and then needs to stay up until 2200 tonight. Procalcitonin elevated at 7.47, MD notified. MSSA scoring q4h. Up with SBA. Bed alarm on for safety and seizure precautions in place. Calls appropriately. Continue plan of care.         Problem: Adult Inpatient Plan of Care  Goal: Plan of Care Review  1/17/2020 0640 by Beatrice Dan, RN  Outcome: No Change  Flowsheets (Taken 1/17/2020 0640)  Plan of Care Reviewed With: patient  Progress: no change     Problem: Adult Inpatient Plan of Care  Goal: Patient-Specific Goal (Individualization)  Outcome: No Change     Problem: Adult Inpatient Plan of Care  Goal: Absence of Hospital-Acquired Illness or Injury  Outcome: No Change     Problem: Adult Inpatient Plan of Care  Goal: Optimal Comfort and Wellbeing  1/17/2020 0640 by Beatrice Dan, RN  Outcome: No Change     Problem: Adult Inpatient Plan of Care  Goal: Readiness for Transition of Care  Outcome: No Change     Problem: Seizure, Active Management  Goal: Absence of Seizure/Seizure-Related Injury  1/17/2020 0640 by Beatrice Dan, RN  Outcome: No Change

## 2020-01-17 NOTE — PLAN OF CARE
AVSS on room air. Pt complains of epigastric pain and low back pain, given one dose of tylenol this morning and given aqua k pad which is helpful. Pt on vEEG. No episodes today. Pt states that she attempted to sleep deprive herself last night and believes that she was up most of the night until 2am when she had a nap. Orders for sleep deprivation for tonight, to stay awake until 0200 and sleep only four hours. Up with SBA and gait belt.     Problem: Seizure, Active Management  Goal: Absence of Seizure/Seizure-Related Injury  1/16/2020 1832 by Tiana Contreras, RN  Outcome: Improving  1/16/2020 0708 by Elida Packer RN  Outcome: No Change     Problem: Adult Inpatient Plan of Care  Goal: Plan of Care Review  1/16/2020 1832 by Tiana Contreras RN  Outcome: No Change  1/16/2020 0708 by Elida Packer RN  Outcome: No Change  Goal: Optimal Comfort and Wellbeing  1/16/2020 1832 by Tiana Contreras RN  Outcome: No Change  1/16/2020 0708 by Elida Packer RN  Outcome: No Change

## 2020-01-17 NOTE — PROGRESS NOTES
Patient has fever 100.4 and tachycardia  Ascites WBC was 1154, 10 % neutrophils. Per GI fellow, this does not meet the Criteria of SBP.    Plan:  Chest x ray and urine analysis  Repeat Blood culture  Continue ceftriaxone for now    Delfina Daily MD  Neurology resident PGY2

## 2020-01-17 NOTE — PROGRESS NOTES
[facility], [place]   Epilepsy Service Daily Note      Interval History:   No target spells. Overnight developed fevers and tachycardia. Procalcitonin elevated as well. Blood cultures obtained and she was started on IV Rocephin. Diagnostic paracentesis done this morning-results pending. Concern for spontaneous bacterial peritonitis. Hepatology was consulted and saw patient this morning.     Patient declines feeling symptoms of alcohol withdrawal. No hallucinations or agitation. Eating ok. Was sleep deprive last night for purpose of seizure induction.     Review of System:   No nausea, No vomiting, no headaches, no dizziness, no chest pain.     Medications:   Antiepileptic Medications Home Doses: levetiracetam 500-1000  Antiepileptic Medications Current Doses: none     Exam: Blood pressure 114/72, pulse 110, temperature 98.9  F (37.2  C), temperature source Oral, resp. rate 16, weight 78.4 kg (172 lb 12.8 oz), SpO2 98 %.   General: NAD  Head: NC/AT  Abdomen: soft, mild tenderness, not significantly distended   Neuro: Alert and oriented. Speech fluent. Hearing intact to normal conversation. Pupils equal, round and reactive to light. EOM's intact. VFF. Face symmetric, tongue midline. Strength 5/5 bilaterally. No drift or pronation. Intact FNF. Sensation grossly intact to light touch.   EEG: no epileptiform discharges   Assessment and Plan: patient seen and discussed with Dr. Dread Aranda. Patient is a 29 year old, right-handed female with a history of alcohol abuse, alcohol induced pancreatitis and hepatitis as well as anemia who is being admitted for characterization of seizures. One target spell with weird smell but this did not progress. No electrographic seizures yet. Did discuss with medicine team continuing elective vEEG monitoring to record seizure for characterization given other chronic issues and they are comfortable with proceeding. Patient would also like to proceed.     -continue  vEEG monitoring  -continue off levetiracetam  -up until 10PM (if able)  -continues to decline psychiatry consult or chemical dependency referral at this time but does say she would like to stop drinking      2. Alcohol abuse with abnormal LFTs, increased INR (1/17 2.03) most likely due to alcoholic hepatitis. RUQ ultrasound suggets hepatic steatosis with moderate amount of abdominal ascites. Abdominal pain felt likely consistent with etoh induced gastritis. Omeprazole BID started. Developed fevers and tachycardia overnight with elevated procalcitonin. She was started on IV Rocephin and paracentesis was done this morning. Blood cultures obtained and she was started on IV Rocephin. Diagnostic paracentesis done this morning (1/17)-results pending. Concern for spontaneous bacterial peritonitis. Hepatology was consulted and saw patient this morning.     -continue management per medicine and GI team   -continue MSSA (last alcoholic beverage 1/14)      Ally Lorenzo PA-C    Total time: 25 minute was spent in the care of this patient. The patient agrees with the above mentioned plan of care. I answered all the patient's questions and addressed immediate concerns. More than 50% of time spent consisted of counseling and coordinating care, including discussion of the diagnostic significance of EEG findings, anti-seizure medication management, and planning for discharge home

## 2020-01-17 NOTE — CONSULTS
HEPATOLOGY CONSULTATION      Date of Admission:  1/15/2020           Reason for Consultation:   We were asked by Dr. Wilkins of medicine to evaluate this patient with possible cirrhosis         ASSESSMENT AND RECOMMENDATIONS:   Assessment:  29 year old female with a history of EtOH abuse, pancreatitis, recurrent seizures, admitted for seizure workup, who GI was consulted on for alcoholic liver disease. AST/ALT elevated in >2:1 pattern consistent with EtOH use. Given presence of ascites, coagulopathy, suspect she has element of chronic liver disease/cirrhosis with superimposed alcoholic hepatitis. MELDNa 19, MDF 81.     We discussed the importance of complete alcohol cessation for allowing potential recovery of liver function and preventing further decline in hepatic function and future complications. We also discussed that ongoing alcohol use may lower her seizure threshold and be at least in part responsible for her ongoing seizures.      Recommendations  - Follow-up diagnostic paracentesis results - cell count/diff, gram stain/culture, albumin, total protein  - Agree with Hep A/B/C serologies  - If evidence of SBP, give albumin 1.5 g/day 25% today and 1 g/kg on day 3  - Chem dep consultation, recommend complete alcohol cessation  - No steroids for alcoholic hepaitits at this time given concern for infection  - Follow-up in outpatient hepatology clinic in 1 month, schedulers messaged  - Monitor neurologic status  - Trend LFTs daily    GI will continue to follow peripherally, please call if questions    Thank you for involving us in this patient's care. Please do not hesitate to contact the GI service with any questions or concerns.     Pt care plan discussed with Dr. Leventhal, GI staff physician.    This note was created with voice recognition software, and while reviewed for accuracy, typos may remain.    Dennis Rodriguez MD  GI Fellow  P:  "[phone]  -------------------------------------------------------------------------------------------------------------------    History of Present Illness   Kota Cowan is a 29 year old female with a history of EtOH abuse, pancreatitis, recurrent seizures who GI was consulted on for alcoholic liver disease.     Admitted 1/15 for characterization of seizures. LFTs notable for AST/ALT elevated in >2:1 pattern. Evening of 1/16 developed fever and abdominal pain. Workup revealed procalcitonin of 7. Ceftriaxone started. Diagnostic paracentesis performed this AM, results pending.     She notes that she has been drinking off and on since age 21. She reports she has been drinking more recently during the holidays, including adding liquor when she previously would only drink wine or beer. She is unable to quantify how much beer she would drink, but drinks IPAs. When drinking wine, she would drink a \"small box\" per day. Recently, she had been drinking a 1/2 pint of liquor per day.    She has previously had pancreatitis 2/2 EtOH. She participated in group and individual outpatient alcohol treatment when she was [age], did not think this was very helpful.     US this admission with \"Echogenic liver appearance suggesting hepatic steatosis. Moderate amount of abdominal ascites\"    Past Medical History    Medical History Date Comments   Acute pharyngitis   multiple episodes   Nocturnal enuresis   no longer an issue 2007   Supervision of other normal pregnancy 6/20/2012     IUD (intrauterine device) in place 4/2013     Alcohol abuse 2014 s/p treatment   Eating disorder        Problem Noted Date   Alcohol-induced acute pancreatitis without infection or necrosis 04/01/2017   Cystitis 01/05/2017   Alcohol withdrawal delirium 01/04/2017   Alcoholic hepatitis without ascites 01/03/2017   Macrocytosis 01/03/2017   Anxiety and depression 11/09/2014   Overdose of muscle relaxant 11/08/2014   Alcohol intoxication 11/08/2014 "   Elevated liver enzymes 11/08/2014   Alcohol abuse 01/01/2014   Overview:     s/p treatment     IUD (intrauterine device) in place 04/01/2013   Alcohol abuse     Overview:     s/p treatment     Eating disorder      Social History   See HPI re: EtOH use  Denies smoking or current/past drug use  No tattoos  Lives with  and 2 children in [facility]  Not working currently, was previously working as a     Family History     Medical History Relation Name Comments   Hypertension Father       Hypertension Mother     Denies FH of liver disease    Allergies   Reviewed and edited as appropriate     Allergies   Allergen Reactions     Lamotrigine Other (See Comments)     confusion          Prior to Admission Medications    Current Facility-Administered Medications   Medication     acetaminophen (TYLENOL) tablet 325 mg     bacitracin ointment     cefTRIAXone (ROCEPHIN) 1 g vial to attach to  mL bag for ADULTS or NS 50 mL bag for PEDS     docusate sodium (COLACE) capsule 100 mg     folic acid (FOLVITE) tablet 1 mg     influenza quadrivalent (PF) vacc (FLUZONE) injection 0.5 mL     lidocaine (LMX4) cream     lidocaine (XYLOCAINE) 2 % solution 5 mL     lidocaine 1 % 0.1-1 mL     LORazepam (ATIVAN) injection 2 mg     magnesium hydroxide (MILK OF MAGNESIA) suspension 30 mL     multivitamin, therapeutic (THERA-VIT) tablet 1 tablet     omeprazole (priLOSEC) CR capsule 20 mg     potassium chloride (KLOR-CON) Packet 20-40 mEq     potassium chloride ER (K-DUR/KLOR-CON M) CR tablet 20-40 mEq     sodium chloride (PF) 0.9% PF flush 3 mL     sodium chloride (PF) 0.9% PF flush 3 mL     vitamin B1 (THIAMINE) tablet 100 mg      Medications Prior to Admission   Medication Sig Dispense Refill Last Dose     levETIRAcetam (KEPPRA) 500 MG tablet TAKE 1 TABLET BY MOUTH EVERY MORNING AND 2 TABLETS EVERY EVENING 90 tablet 0 1/14/2020 at evening     LORazepam (ATIVAN) 1 MG tablet Take 1 mg by mouth every 6 hours as needed for  anxiety or seizures   Past Week at Unknown time        Review of Systems   A complete review of systems was performed and is negative except as noted in the HPI      Physical Exam   /75 (BP Location: Left arm)   Pulse 114   Temp 99.7  F (37.6  C) (Oral)   Resp 16   Wt 78.4 kg (172 lb 12.8 oz)   SpO2 95%   Wt:   Wt Readings from Last 2 Encounters:   01/17/20 78.4 kg (172 lb 12.8 oz)   06/20/19 73.6 kg (162 lb 3.2 oz)      Constitutional: cooperative, pleasant, not dyspneic/diaphoretic, no acute distress  Eyes: Sclera icteric  Ears/nose/mouth/throat: mucus membranes moist  Neck: supple  CV: RRR  Respiratory: Unlabored breathing  Abd: Mildly distended, +bs, minimally tender in epigastrium  Skin: warm, perfused  Neuro: AAO x 3, No asterixis  Psych: Normal affect  MSK: No gross deformities    Data   Labs and imaging below were independently reviewed and interpreted    BMP  Recent Labs   Lab 01/16/20  0132 01/15/20  1113    134   POTASSIUM 3.4 3.1*   CHLORIDE 102 101   SAMIRA 7.2* 7.8*   CO2 27 24   BUN 6* 4*   CR 0.36* 0.37*   GLC 91 87     CBC  Recent Labs   Lab 01/15/20  1113   WBC 7.2   RBC 2.62*   HGB 9.8*   HCT 29.5*   *   MCH 37.4*   MCHC 33.2   RDW 14.3        INR  Recent Labs   Lab 01/17/20  0456 01/16/20  0132 01/15/20  1502   INR 2.03* 1.85* 1.63*     LFTs  Recent Labs   Lab 01/17/20  0456 01/16/20  0132 01/15/20  1113   ALKPHOS 175* 197* 230*   * 220* 264*   ALT 51* 61* 72*   BILITOTAL 2.3* 2.2* 1.6*   PROTTOTAL 5.3* 5.7* 6.5*   ALBUMIN 2.0* 2.1* 2.6*        Imaging:  Reviewed in EMR

## 2020-01-18 ENCOUNTER — ALLIED HEALTH/NURSE VISIT (OUTPATIENT)
Dept: NEUROLOGY | Facility: CLINIC | Age: 30
End: 2020-01-18
Attending: PSYCHIATRY & NEUROLOGY
Payer: COMMERCIAL

## 2020-01-18 ENCOUNTER — APPOINTMENT (OUTPATIENT)
Dept: ULTRASOUND IMAGING | Facility: CLINIC | Age: 30
End: 2020-01-18
Attending: PHYSICIAN ASSISTANT
Payer: COMMERCIAL

## 2020-01-18 DIAGNOSIS — R56.9 SEIZURES (H): Primary | ICD-10-CM

## 2020-01-18 LAB
ABO + RH BLD: NORMAL
ABO + RH BLD: NORMAL
ALBUMIN SERPL-MCNC: 2.1 G/DL (ref 3.4–5)
ALP SERPL-CCNC: 146 U/L (ref 40–150)
ALT SERPL W P-5'-P-CCNC: 36 U/L (ref 0–50)
ANION GAP SERPL CALCULATED.3IONS-SCNC: 6 MMOL/L (ref 3–14)
AST SERPL W P-5'-P-CCNC: 83 U/L (ref 0–45)
BASOPHILS # BLD AUTO: 0 10E9/L (ref 0–0.2)
BASOPHILS NFR BLD AUTO: 0.3 %
BILIRUB SERPL-MCNC: 3.9 MG/DL (ref 0.2–1.3)
BLD GP AB SCN SERPL QL: NORMAL
BLD PROD TYP BPU: NORMAL
BLD UNIT ID BPU: 0
BLOOD BANK CMNT PATIENT-IMP: NORMAL
BLOOD PRODUCT CODE: NORMAL
BPU ID: NORMAL
BUN SERPL-MCNC: 5 MG/DL (ref 7–30)
CALCIUM SERPL-MCNC: 7.1 MG/DL (ref 8.5–10.1)
CHLORIDE SERPL-SCNC: 98 MMOL/L (ref 94–109)
CO2 SERPL-SCNC: 26 MMOL/L (ref 20–32)
CREAT SERPL-MCNC: 0.46 MG/DL (ref 0.52–1.04)
CRP SERPL-MCNC: 21 MG/L (ref 0–8)
DIFFERENTIAL METHOD BLD: ABNORMAL
EOSINOPHIL # BLD AUTO: 0.2 10E9/L (ref 0–0.7)
EOSINOPHIL NFR BLD AUTO: 1.7 %
ERYTHROCYTE [DISTWIDTH] IN BLOOD BY AUTOMATED COUNT: 14.5 % (ref 10–15)
ERYTHROCYTE [DISTWIDTH] IN BLOOD BY AUTOMATED COUNT: 14.6 % (ref 10–15)
FIBRINOGEN PPP-MCNC: 165 MG/DL (ref 200–420)
GFR SERPL CREATININE-BSD FRML MDRD: >90 ML/MIN/{1.73_M2}
GLUCOSE SERPL-MCNC: 114 MG/DL (ref 70–99)
HCT VFR BLD AUTO: 16.3 % (ref 35–47)
HCT VFR BLD AUTO: 17.5 % (ref 35–47)
HGB BLD-MCNC: 5.4 G/DL (ref 11.7–15.7)
HGB BLD-MCNC: 5.6 G/DL (ref 11.7–15.7)
HGB BLD-MCNC: 6.7 G/DL (ref 11.7–15.7)
HGB BLD-MCNC: 7.4 G/DL (ref 11.7–15.7)
IMM GRANULOCYTES # BLD: 0.1 10E9/L (ref 0–0.4)
IMM GRANULOCYTES NFR BLD: 1.1 %
INR PPP: 1.47 (ref 0.86–1.14)
LYMPHOCYTES # BLD AUTO: 2 10E9/L (ref 0.8–5.3)
LYMPHOCYTES NFR BLD AUTO: 17.6 %
MCH RBC QN AUTO: 37 PG (ref 26.5–33)
MCH RBC QN AUTO: 37.3 PG (ref 26.5–33)
MCHC RBC AUTO-ENTMCNC: 32 G/DL (ref 31.5–36.5)
MCHC RBC AUTO-ENTMCNC: 33.1 G/DL (ref 31.5–36.5)
MCV RBC AUTO: 112 FL (ref 78–100)
MCV RBC AUTO: 117 FL (ref 78–100)
MONOCYTES # BLD AUTO: 2 10E9/L (ref 0–1.3)
MONOCYTES NFR BLD AUTO: 17.3 %
NEUTROPHILS # BLD AUTO: 7.2 10E9/L (ref 1.6–8.3)
NEUTROPHILS NFR BLD AUTO: 62 %
NRBC # BLD AUTO: 0 10*3/UL
NRBC BLD AUTO-RTO: 0 /100
NUM BPU REQUESTED: 2
NUM BPU REQUESTED: 3
PLATELET # BLD AUTO: 183 10E9/L (ref 150–450)
PLATELET # BLD AUTO: 189 10E9/L (ref 150–450)
POTASSIUM SERPL-SCNC: 2.9 MMOL/L (ref 3.4–5.3)
POTASSIUM SERPL-SCNC: 3.8 MMOL/L (ref 3.4–5.3)
PROCALCITONIN SERPL-MCNC: 2.56 NG/ML
PROT SERPL-MCNC: 5.1 G/DL (ref 6.8–8.8)
RBC # BLD AUTO: 1.46 10E12/L (ref 3.8–5.2)
RBC # BLD AUTO: 1.5 10E12/L (ref 3.8–5.2)
SODIUM SERPL-SCNC: 131 MMOL/L (ref 133–144)
SPECIMEN EXP DATE BLD: NORMAL
TRANSFUSION STATUS PATIENT QL: NORMAL
WBC # BLD AUTO: 11.6 10E9/L (ref 4–11)
WBC # BLD AUTO: 11.6 10E9/L (ref 4–11)

## 2020-01-18 PROCEDURE — P9059 PLASMA, FRZ BETWEEN 8-24HOUR: HCPCS | Performed by: PHYSICIAN ASSISTANT

## 2020-01-18 PROCEDURE — 84520 ASSAY OF UREA NITROGEN: CPT | Performed by: INTERNAL MEDICINE

## 2020-01-18 PROCEDURE — 86140 C-REACTIVE PROTEIN: CPT | Performed by: PHYSICIAN ASSISTANT

## 2020-01-18 PROCEDURE — 99207 ZZC APP CREDIT; MD BILLING SHARED VISIT: CPT | Performed by: PHYSICIAN ASSISTANT

## 2020-01-18 PROCEDURE — 25000132 ZZH RX MED GY IP 250 OP 250 PS 637: Performed by: STUDENT IN AN ORGANIZED HEALTH CARE EDUCATION/TRAINING PROGRAM

## 2020-01-18 PROCEDURE — 95711 VEEG 2-12 HR UNMONITORED: CPT | Mod: ZF

## 2020-01-18 PROCEDURE — 84295 ASSAY OF SERUM SODIUM: CPT | Performed by: INTERNAL MEDICINE

## 2020-01-18 PROCEDURE — 86900 BLOOD TYPING SEROLOGIC ABO: CPT | Performed by: PSYCHIATRY & NEUROLOGY

## 2020-01-18 PROCEDURE — 85018 HEMOGLOBIN: CPT | Performed by: NURSE PRACTITIONER

## 2020-01-18 PROCEDURE — 99233 SBSQ HOSP IP/OBS HIGH 50: CPT | Performed by: INTERNAL MEDICINE

## 2020-01-18 PROCEDURE — 25000132 ZZH RX MED GY IP 250 OP 250 PS 637: Performed by: NURSE PRACTITIONER

## 2020-01-18 PROCEDURE — 85384 FIBRINOGEN ACTIVITY: CPT | Performed by: PHYSICIAN ASSISTANT

## 2020-01-18 PROCEDURE — 85610 PROTHROMBIN TIME: CPT | Performed by: PHYSICIAN ASSISTANT

## 2020-01-18 PROCEDURE — 84145 PROCALCITONIN (PCT): CPT | Performed by: PHYSICIAN ASSISTANT

## 2020-01-18 PROCEDURE — 85018 HEMOGLOBIN: CPT | Performed by: PHYSICIAN ASSISTANT

## 2020-01-18 PROCEDURE — 84132 ASSAY OF SERUM POTASSIUM: CPT | Performed by: INTERNAL MEDICINE

## 2020-01-18 PROCEDURE — 80076 HEPATIC FUNCTION PANEL: CPT | Performed by: INTERNAL MEDICINE

## 2020-01-18 PROCEDURE — 25000128 H RX IP 250 OP 636: Performed by: NURSE PRACTITIONER

## 2020-01-18 PROCEDURE — 86923 COMPATIBILITY TEST ELECTRIC: CPT | Performed by: PSYCHIATRY & NEUROLOGY

## 2020-01-18 PROCEDURE — 86901 BLOOD TYPING SEROLOGIC RH(D): CPT | Performed by: PSYCHIATRY & NEUROLOGY

## 2020-01-18 PROCEDURE — 82310 ASSAY OF CALCIUM: CPT | Performed by: INTERNAL MEDICINE

## 2020-01-18 PROCEDURE — 40000344 ZZHCL STATISTIC THAWING COMPONENT: Performed by: PHYSICIAN ASSISTANT

## 2020-01-18 PROCEDURE — 85018 HEMOGLOBIN: CPT | Performed by: INTERNAL MEDICINE

## 2020-01-18 PROCEDURE — 76830 TRANSVAGINAL US NON-OB: CPT

## 2020-01-18 PROCEDURE — P9016 RBC LEUKOCYTES REDUCED: HCPCS | Performed by: PSYCHIATRY & NEUROLOGY

## 2020-01-18 PROCEDURE — 82947 ASSAY GLUCOSE BLOOD QUANT: CPT | Performed by: INTERNAL MEDICINE

## 2020-01-18 PROCEDURE — 36415 COLL VENOUS BLD VENIPUNCTURE: CPT | Performed by: INTERNAL MEDICINE

## 2020-01-18 PROCEDURE — 99207 ZZC APP CREDIT; MD BILLING SHARED VISIT: CPT | Performed by: INTERNAL MEDICINE

## 2020-01-18 PROCEDURE — 85025 COMPLETE CBC W/AUTO DIFF WBC: CPT | Performed by: INTERNAL MEDICINE

## 2020-01-18 PROCEDURE — 80053 COMPREHEN METABOLIC PANEL: CPT | Performed by: PHYSICIAN ASSISTANT

## 2020-01-18 PROCEDURE — 36415 COLL VENOUS BLD VENIPUNCTURE: CPT | Performed by: PHYSICIAN ASSISTANT

## 2020-01-18 PROCEDURE — 82374 ASSAY BLOOD CARBON DIOXIDE: CPT | Performed by: INTERNAL MEDICINE

## 2020-01-18 PROCEDURE — 86850 RBC ANTIBODY SCREEN: CPT | Performed by: PSYCHIATRY & NEUROLOGY

## 2020-01-18 PROCEDURE — 85027 COMPLETE CBC AUTOMATED: CPT | Performed by: PHYSICIAN ASSISTANT

## 2020-01-18 PROCEDURE — 12000012 ZZH R&B MS OVERFLOW UMMC

## 2020-01-18 PROCEDURE — 82435 ASSAY OF BLOOD CHLORIDE: CPT | Performed by: INTERNAL MEDICINE

## 2020-01-18 PROCEDURE — 25000132 ZZH RX MED GY IP 250 OP 250 PS 637: Performed by: PHYSICIAN ASSISTANT

## 2020-01-18 PROCEDURE — 82565 ASSAY OF CREATININE: CPT | Performed by: INTERNAL MEDICINE

## 2020-01-18 RX ORDER — POTASSIUM CL/LIDO/0.9 % NACL 10MEQ/0.1L
10 INTRAVENOUS SOLUTION, PIGGYBACK (ML) INTRAVENOUS
Status: DISCONTINUED | OUTPATIENT
Start: 2020-01-18 | End: 2020-01-20 | Stop reason: HOSPADM

## 2020-01-18 RX ORDER — POTASSIUM CHLORIDE 29.8 MG/ML
20 INJECTION INTRAVENOUS
Status: DISCONTINUED | OUTPATIENT
Start: 2020-01-18 | End: 2020-01-20 | Stop reason: HOSPADM

## 2020-01-18 RX ORDER — POTASSIUM CHLORIDE 1.5 G/1.58G
20-40 POWDER, FOR SOLUTION ORAL
Status: DISCONTINUED | OUTPATIENT
Start: 2020-01-18 | End: 2020-01-20 | Stop reason: HOSPADM

## 2020-01-18 RX ORDER — POTASSIUM CHLORIDE 750 MG/1
20-40 TABLET, EXTENDED RELEASE ORAL
Status: DISCONTINUED | OUTPATIENT
Start: 2020-01-18 | End: 2020-01-20 | Stop reason: HOSPADM

## 2020-01-18 RX ORDER — POTASSIUM CHLORIDE 7.45 MG/ML
10 INJECTION INTRAVENOUS
Status: DISCONTINUED | OUTPATIENT
Start: 2020-01-18 | End: 2020-01-20 | Stop reason: HOSPADM

## 2020-01-18 RX ADMIN — POTASSIUM CHLORIDE 40 MEQ: 750 TABLET, EXTENDED RELEASE ORAL at 15:14

## 2020-01-18 RX ADMIN — OMEPRAZOLE 20 MG: 20 CAPSULE, DELAYED RELEASE ORAL at 08:17

## 2020-01-18 RX ADMIN — POTASSIUM CHLORIDE 40 MEQ: 750 TABLET, EXTENDED RELEASE ORAL at 18:04

## 2020-01-18 RX ADMIN — Medication 100 MG: at 08:18

## 2020-01-18 RX ADMIN — CEFTRIAXONE 1 G: 1 INJECTION, POWDER, FOR SOLUTION INTRAMUSCULAR; INTRAVENOUS at 23:11

## 2020-01-18 RX ADMIN — THERA TABS 1 TABLET: TAB at 08:17

## 2020-01-18 RX ADMIN — FOLIC ACID 1 MG: 1 TABLET ORAL at 08:18

## 2020-01-18 RX ADMIN — OMEPRAZOLE 20 MG: 20 CAPSULE, DELAYED RELEASE ORAL at 17:14

## 2020-01-18 RX ADMIN — ACETAMINOPHEN 325 MG: 325 TABLET, FILM COATED ORAL at 21:07

## 2020-01-18 ASSESSMENT — ACTIVITIES OF DAILY LIVING (ADL)
ADLS_ACUITY_SCORE: 10

## 2020-01-18 ASSESSMENT — MIFFLIN-ST. JEOR: SCORE: 1521.11

## 2020-01-18 ASSESSMENT — PAIN DESCRIPTION - DESCRIPTORS: DESCRIPTORS: ACHING

## 2020-01-18 NOTE — PROGRESS NOTES
Medicine gold cross coverage physician note:  I was notified by the nursing staff that the patient had a drop of her hemoglobin from 9.8-5.6.  Repeat hemoglobin was 5.4.  However, the patient is hemodynamically stable.    I personally assessed the patient at the bedside.  The patient reported that she has been having excessive vaginal bleeding that she relates to her..  She reported that her pads are soaked in blood and multiple times she needed to change her underwear because of this.  She denied any awareness of melena or any increase in her bowel movements.    I have ordered 2 units of blood transfusion.  I will order a repeat CBC within 2 hours of the second unit of the blood transfusion.  We will consider getting a gynecology consultation.  I will sign this off to the primary general internal medicine team taking care of the patient.

## 2020-01-18 NOTE — PLAN OF CARE
Tmax: 100.4, tachycardic (110-114), OVSS. C/O abdominal pain and a mild headache that resolved with tylenol. Utilizing heat for abd. On sleep deprivation protocol for seizure induction. No seizure activity noted. Can sleep @ 2200. Blood cultures, UA, and CXR ordered for continued temps. On ceftriaxone. MSSA score continues in the 8-9 range. Will continue to monitor.     Problem: Adult Inpatient Plan of Care  Goal: Plan of Care Review  1/17/2020 1826 by Rosangela Santana, RN  Outcome: No Change     Problem: Adult Inpatient Plan of Care  Goal: Patient-Specific Goal (Individualization)  1/17/2020 1826 by Rosangela Santana, RN  Outcome: No Change     Problem: Seizure, Active Management  Goal: Absence of Seizure/Seizure-Related Injury  1/17/2020 1826 by Rosangela Santana, RN  Outcome: No Change

## 2020-01-18 NOTE — PROGRESS NOTES
Critical hemoglobin 6.7. Provider placed order to transfuse 1 unit PRBC. Plan for hgb recheck q 6 hrs. Will continue to monitor.

## 2020-01-18 NOTE — PLAN OF CARE
"/72   Pulse 110   Temp 99.5  F (37.5  C) (Oral)   Resp 16   Ht 1.67 m (5' 5.75\")   Wt 78.4 kg (172 lb 12.8 oz)   SpO2 95%   BMI 28.10 kg/m    Tachy, Tmax 99.5. No complaint of pain or V/D. Slightly nauseous after getting up to the bathroom. Chest x-ray last night, showed L pleural effusion. Pt had to get up multiple times overnight to change pad due to menses. Pt complaint of passing clots. Hbg dropped to 5.4 this AM, MD notified. Consent signed and ordered placed for RBCs. 1U RBC infusing. Pt notified MD of passing clots. No seizure activity. MSSA score was 7 overnight. Up to the bathroom with stand by assist and gait belt. Bed alarm activated. Continue with POC.     Problem: Adult Inpatient Plan of Care  Goal: Plan of Care Review  1/18/2020 0609 by Narayan Giles, RN  Outcome: No Change     Problem: Adult Inpatient Plan of Care  Goal: Patient-Specific Goal (Individualization)  1/18/2020 0609 by Narayan Giles, RN  Outcome: No Change     Problem: Adult Inpatient Plan of Care  Goal: Absence of Hospital-Acquired Illness or Injury  Outcome: No Change     Problem: Adult Inpatient Plan of Care  Goal: Optimal Comfort and Wellbeing  Outcome: No Change     Problem: Seizure, Active Management  Goal: Absence of Seizure/Seizure-Related Injury  1/18/2020 0609 by Narayan Giles, RN  Outcome: No Change     "

## 2020-01-18 NOTE — PROGRESS NOTES
Gold cross cover paged for pt with reported large volume blood loss with menses. RN visualized blood clot she is passing with menses. Clot is very large in size (approximately 5-6 cm). Blood pressure dropped from 113/70 to 91/55.   Return call: MD ordered fluid bolus. Primary team will come visualize clot. RN has hung second unit PRBC. BP recheck 108/71.

## 2020-01-18 NOTE — PROCEDURES
Procedure Date: 01/17/2020      EEG #-3       DATE OF RECORDING/SERVICE DATE:  01/17/2020       SOURCE FILE DURATION:  23 hours, 52 minutes.      TYPE OF STUDY:  Video day #3.        HISTORY:  Kota Cowan is a 29-year-old right-handed female with a history of alcohol abuse and seizure disorder.  Her seizures started at the age of 26.  Video EEG is requested for characterization of seizures.      TECHNICAL SUMMARY: This video EEG monitoring procedure was performed with 23 scalp electrodes in 10-20 system placements, and additional scalp, precordial and other surface electrodes used for electrical referencing and artifact detection. Video was reviewed intermittently by EEG technologist and physician for electroclinical seizures.     BACKGROUND ACTIVITY:  During wakefulness, the background activity consists of synchronous and symmetric, well modulated, 8-9 Hz posterior dominant rhythm. The posterior dominant rhythm attenuated with eye opening. During drowsiness, the background activity waxed and waned and there were periods of slowing and attenuation of the posterior alpha rhythm. Stage I sleep, Stage II sleep and Stage 3 sleep  was recorded in which synchronous and symmetrical vertex waves , K-complexes and sleep spindles  were identified.  No focal slowing was seen.       ACTIVATION PROCEDURES:  Hyperventilation was completed with no significant abnormalities.      EPILEPTIFORM DISCHARGES:  The patient has right frontotemporal epileptiform discharges with maximum negativity best seen at T2, F8, T4, and a field into F6, FP2 and F4 low voltage.  Maximum involvement is at T2 and F8, T4.  These discharges are seen in rare instances; the best examples are at 03:04:22, 02:30:22, 02:39:01 and 02:58:38.  The patient did have increased discharges in sleep.      ICTAL:  None.      The patient had 1 event where she pressed the event marker, and the patient was looking at her phone and rubbed her eyes then.  During  this time, there was no obvious clinical seizure manifestation appreciated on the video.      IMPRESSION:  Video EEG day #3 is abnormal due to presence of right temporal lobe epileptiform discharges consistent with focal irritability in this region.  The patient did press the event button once with no clear EEG correlate.  It was not clear why she pressed the event button.  No electrographic seizures were seen.         VICKIE MARTÍNEZ MD             D: 2020   T: 2020   MT: ANGI      Name:     ANTWON CLIFTON   MRN:      [id number]        Account:        [id number]   :      1990           Procedure Date: 2020      Document: [id number]

## 2020-01-18 NOTE — PROGRESS NOTES
"[facility], [place]   Epilepsy Service Daily Note      Interval History:   No target spells. Overnight she had drop in hgb and given blood transfusion. She has been tachycardiac.  Patient declines feeling symptoms of alcohol withdrawal. No hallucinations or agitation. Eating ok. Was sleep deprive last night for purpose of seizure induction.     Review of System:   No nausea, No vomiting, no headaches, no dizziness, no chest pain.     Medications:   Antiepileptic Medications Home Doses: levetiracetam 500-1000  Antiepileptic Medications Current Doses: none     Exam: Blood pressure 107/75, pulse 99, temperature 99.4  F (37.4  C), resp. rate 16, height 1.67 m (5' 5.75\"), weight 78.3 kg (172 lb 11.2 oz), SpO2 97 %.   General: NAD  Head: NC/AT  Abdomen: soft, mild tenderness, not significantly distended   Neuro: Alert and oriented. Speech fluent. Hearing intact to normal conversation. Pupils equal, round and reactive to light. EOM's intact. VFF. Face symmetric, tongue midline. Strength 5/5 bilaterally. No drift or pronation. Intact FNF. Sensation grossly intact to light touch.   EEG: right temporal epileptiform discharges   Assessment and Plan:   Patient is a 29 year old, right-handed female with a history of alcohol abuse, alcohol induced pancreatitis and hepatitis as well as anemia who is being admitted for characterization of seizures. One target spell with weird smell but this did not progress. No electrographic seizures yet. She has right temporal epileptiform discharges suggestive of cortical irritability in this region, no overt EEG seizure yet. It would be helpful to record a seizure to characterize her seizures.     -continue vEEG monitoring  -continue off levetiracetam  -continues to decline inpatient psychiatry consult, but will think about outpatient chemical dependency      2. Alcohol abuse with abnormal LFTs, increased INR (1/17 2.03) most likely due to alcoholic hepatitis. RUQ " ultrasound suggets hepatic steatosis with moderate amount of abdominal ascites. Abdominal pain felt likely consistent with etoh induced gastritis. Omeprazole BID started. Developed fevers and tachycardia overnight with elevated procalcitonin. She was started on IV Rocephin and paracentesis was done this morning. Blood cultures obtained and she was started on IV Rocephin. Diagnostic paracentesis done this morning (1/17)-results pending. Concern for spontaneous bacterial peritonitis. Hepatology was consulted and saw patient this morning.     -continue management per medicine and GI team   -continue MSSA (last alcoholic beverage 1/14)  - medicine has scheduled 2 units of blood as repeat hemoglobin 5.4      Melinda Canada MD     Total time: 25 minute was spent in the care of this patient. The patient agrees with the above mentioned plan of care. I answered all the patient's questions and addressed immediate concerns. More than 50% of time spent consisted of counseling and coordinating care, including discussion of the diagnostic significance of EEG findings, anti-seizure medication management, and planning for discharge home

## 2020-01-18 NOTE — PROGRESS NOTES
[facility], [place]    Medicine Progress Note - Hospitalist Service, Gold 7       Date of Admission:  1/15/2020  Assessment & Plan    Kota is a 28 yo female with hx of etoh abuse, etoh induced pancreatitis, and recurrent sz's admitted to Neurology service to undergo characterization of her seizures. Internal medicine consulted to eval pt's c/o acute abd pain within context of elevated LFTs and recent etoh abuse.     Plan for today:  - Transfuse pt with 2 units pRBCs and 2 units FFP  - D/w Ob/gyn, and obtain routine transvaginal US given severe abnormal vaginal bleeding  - Repeat Hgb this afternoon  - Obtain repeat procal and routine CRP  - Continue empiric ceftriaxone for now    # Acute on chronic, recurrent abd pain  # Acute on chronic severe etoh abuse  # Abnormal LFTs most likely 2/2 alcoholic hepatitis  # Elevated INR most likely 2/2 either etoh liver disease induced coagulopathy and/or malnutrition  Admitted with abd pain that persisted over the past week within the context of heavy daily etoh use, last used 1/14. States has had recurrent pain for a long time and usually occurs with etoh use. Drinking heavily at least since age 21. LFTs noted to be elevated in chart back to 2017. Denies assoc hematemesis, melena, hematochezia or coffee ground emesis. Does have frequent heartburn but denies treatment. RUQ abd US 1/15 with echogenic liver most likely due to hepatic steatosis. Also revealed mod amt of ascites but pt denies hx of ascites or [place]. Abd exam reveals minimal distention. Abd pain most likely consistent with etoh induced gastritis. Viral hepatitis screen neg. Declines wanting referral to CD, but agreeable to GI referral. Most recent LFTs since this admission: Total bili 3.9 (2.3), Alk phos 146 (175), ALT 36 (51), AST 83 (170). INR increased to 2.03 yesterday (1.85) despite giving pt one time dose (5mg) of Vit K on 1/16, this am lowered to 1.47. Has had recurrent low  grade fevers since night on 1/16 with procal ~7 concerning for SBP and pt started empirically on ceftriaxone; however, underwent para yesterday am with 1154 WBC but PMN count 115. Fib-4 score 4.6.   MELD-Na score: 19 at 1/17/2020  4:56 AM  MELD score: 17 at 1/17/2020  4:56 AM  Calculated from:  Serum Creatinine: 0.36 mg/dL (Rounded to 1 mg/dL) at 1/16/2020  1:32 AM  Serum Sodium: 135 mmol/L at 1/16/2020  1:32 AM  Total Bilirubin: 2.3 mg/dL at 1/17/2020  4:56 AM  INR(ratio): 2.03 at 1/17/2020  4:56 AM  Age: 29 years     - GI consulted and appreciate recommendations.   - Give 2 units FFP given concurrent severe abnormal vag bleeding  - Continue with empiric ceftriaxone for now   - Strongly encourage etoh cessation after discharge.   - Continue CIWA protocol for s/s of etoh withdrawal.  - Continue BID PPI   - Repeat hepatic panel and INR tomorrow am  - Continue with oral thiamine and folate supplementation  - GI referral after discharge (GI has given pt a GI specialist in [place] close to where she lives).   - Follow up with PCP after discharge to discuss possibly entering OP CD tx program and/or starting medication to decrease etoh cravings.    # SIRS: Pt developed fever up to 101F and tachycardia night before last with concurrent procal ~7 concerning for SBP, but ascites analysis from yesterday's <250 neuts. Started on ceftriaxone. WBC mildly elevated at 11.2; however, CXR neg. UA neg for infection. Pt denies sxs of infection. Question fever and tachy 2/2 alcoholic hepatitis.   - Repeat procal; add-on CRP  - For now continue IV ceftriaxone.     # Abnormal vaginal bleeding  # Acute blood loss anemia  Pt states that one week ago started her period that continued until last Tues, then restarted heavily on Wed with large clots. Denies other abnormal vag sxs or abnormal urinary sxs. Hgb decreased to 5.4. D/w staff Ob/gyn and most likely due to pt's coagulopathy from cirrhosis if no structural abnormalities noted via  trans vag U/S.   - Transfuse with two units pRBCs  - Transfuse with two units FFP  - Obtain routine transvaginal US  - Repeat Hgb tonight at 8 pm.     # Hx of recurrent seizures: Management per primary team.      The patient's care was discussed with the Attending Physician, Dr. Wilkins, Bedside Nurse, Patient and Primary team.    Wilmer Mendes PA-C  Hospitalist Service, [address], [place]  Pager: [phone]  Please see sticky note for cross cover information  ______________________________________________________________________    Interval History   Developed fever up to 101F last night with subsequently ordered procal ~7. Started empirically on ceftriaxone for SBP. States abd pain persists but improved from yesterday. Denies fever, chills, chest pain and other acute physical concerns at this time.     Data reviewed today: I reviewed all medications, new labs and imaging results over the last 24 hours.     Physical Exam   Vital Signs: Temp: 99.3  F (37.4  C) Temp src: Axillary BP: 109/65 Pulse: 101 Heart Rate: 101 Resp: 16 SpO2: 98 % O2 Device: None (Room air)    Weight: 172 lbs 11.2 oz  GEN: In NAD  HEENT: NCAT; PERRL; sclerae non-icteric  LUNGS: CTAB  CV: RRR  ABD: +BSs; mildly tender over epigastrium without guarding.   EXT: No BLE edema  SKIN: No acute rashes noted on exposed areas.  NEURO: AAOx3; CNs grossly intact; No acute focal deficits noted.      Data   Recent Results (from the past 24 hour(s))   XR Chest Port 1 View    Narrative    EXAMINATION: XR CHEST PORT 1 VW, 1/17/2020 8:54 PM    INDICATION: fever, elevated procalcitonin    COMPARISON: None available    FINDINGS: Single portable AP radiograph of chest. Adjacent lung. The  cardiomediastinal silhouette is within normal limits. Small left  pleural effusion. No right pleural effusion. No pneumothorax. No focal  airspace opacity. Partially visualized upper abdomen is unremarkable.  Subacute lateral right rib 10  fracture. No acute osseous pathology.  Soft tissues unremarkable.      Impression    IMPRESSION: No acute radiographically evident pulmonary abnormality.    I have personally reviewed the examination and initial interpretation  and I agree with the findings.    LISETTE DIAZ MD     CMP  Recent Labs   Lab 01/18/20  1215 01/17/20  0456 01/16/20  0132 01/15/20  1113   *  --  135 134   POTASSIUM 2.9*  --  3.4 3.1*   CHLORIDE 98  --  102 101   CO2 26  --  27 24   ANIONGAP 6  --  5 9   *  --  91 87   BUN 5*  --  6* 4*   CR 0.46*  --  0.36* 0.37*   GFRESTIMATED >90  --  >90 >90   GFRESTBLACK >90  --  >90 >90   SAMIRA 7.1*  --  7.2* 7.8*   MAG  --   --  1.7  --    PROTTOTAL 5.1* 5.3* 5.7* 6.5*   ALBUMIN 2.1* 2.0* 2.1* 2.6*   BILITOTAL 3.9* 2.3* 2.2* 1.6*   ALKPHOS 146 175* 197* 230*   AST 83* 170* 220* 264*   ALT 36 51* 61* 72*     CBC  Recent Labs   Lab 01/18/20  0342 01/18/20  0245 01/15/20  1113   WBC 11.6* 11.6* 7.2   RBC 1.46* 1.50* 2.62*   HGB 5.4* 5.6* 9.8*   HCT 16.3* 17.5* 29.5*   * 117* 113*   MCH 37.0* 37.3* 37.4*   MCHC 33.1 32.0 33.2   RDW 14.5 14.6 14.3    183 150     INR  Recent Labs   Lab 01/18/20  1215 01/17/20  0456 01/16/20  0132 01/15/20  1502   INR 1.47* 2.03* 1.85* 1.63*

## 2020-01-19 ENCOUNTER — ALLIED HEALTH/NURSE VISIT (OUTPATIENT)
Dept: NEUROLOGY | Facility: CLINIC | Age: 30
End: 2020-01-19
Attending: PSYCHIATRY & NEUROLOGY
Payer: COMMERCIAL

## 2020-01-19 DIAGNOSIS — R56.9 SEIZURES (H): Primary | ICD-10-CM

## 2020-01-19 LAB
ALBUMIN SERPL-MCNC: 2 G/DL (ref 3.4–5)
ALBUMIN SERPL-MCNC: 2 G/DL (ref 3.4–5)
ALP SERPL-CCNC: 144 U/L (ref 40–150)
ALP SERPL-CCNC: 148 U/L (ref 40–150)
ALT SERPL W P-5'-P-CCNC: 29 U/L (ref 0–50)
ALT SERPL W P-5'-P-CCNC: 31 U/L (ref 0–50)
ANION GAP SERPL CALCULATED.3IONS-SCNC: 6 MMOL/L (ref 3–14)
AST SERPL W P-5'-P-CCNC: 83 U/L (ref 0–45)
AST SERPL W P-5'-P-CCNC: 90 U/L (ref 0–45)
BILIRUB DIRECT SERPL-MCNC: 1.9 MG/DL (ref 0–0.2)
BILIRUB SERPL-MCNC: 2.1 MG/DL (ref 0.2–1.3)
BILIRUB SERPL-MCNC: 3.4 MG/DL (ref 0.2–1.3)
BUN SERPL-MCNC: 3 MG/DL (ref 7–30)
BUN SERPL-MCNC: 4 MG/DL (ref 7–30)
CALCIUM SERPL-MCNC: 7.1 MG/DL (ref 8.5–10.1)
CALCIUM SERPL-MCNC: 7.3 MG/DL (ref 8.5–10.1)
CHLORIDE SERPL-SCNC: 101 MMOL/L (ref 94–109)
CHLORIDE SERPL-SCNC: 106 MMOL/L (ref 94–109)
CO2 SERPL-SCNC: 22 MMOL/L (ref 20–32)
CO2 SERPL-SCNC: 24 MMOL/L (ref 20–32)
CREAT SERPL-MCNC: 0.42 MG/DL (ref 0.52–1.04)
CREAT SERPL-MCNC: 0.46 MG/DL (ref 0.52–1.04)
CRP SERPL-MCNC: 21 MG/L (ref 0–8)
ERYTHROCYTE [DISTWIDTH] IN BLOOD BY AUTOMATED COUNT: 26.8 % (ref 10–15)
GFR SERPL CREATININE-BSD FRML MDRD: >90 ML/MIN/{1.73_M2}
GFR SERPL CREATININE-BSD FRML MDRD: >90 ML/MIN/{1.73_M2}
GLUCOSE SERPL-MCNC: 117 MG/DL (ref 70–99)
GLUCOSE SERPL-MCNC: 90 MG/DL (ref 70–99)
HCT VFR BLD AUTO: 22.4 % (ref 35–47)
HGB BLD-MCNC: 7.6 G/DL (ref 11.7–15.7)
INR PPP: 1.41 (ref 0.86–1.14)
MCH RBC QN AUTO: 31.8 PG (ref 26.5–33)
MCHC RBC AUTO-ENTMCNC: 33.9 G/DL (ref 31.5–36.5)
MCV RBC AUTO: 94 FL (ref 78–100)
PLATELET # BLD AUTO: 130 10E9/L (ref 150–450)
POTASSIUM SERPL-SCNC: 4.2 MMOL/L (ref 3.4–5.3)
PROT SERPL-MCNC: 5 G/DL (ref 6.8–8.8)
PROT SERPL-MCNC: 5.1 G/DL (ref 6.8–8.8)
RBC # BLD AUTO: 2.39 10E12/L (ref 3.8–5.2)
SODIUM SERPL-SCNC: 129 MMOL/L (ref 133–144)
SODIUM SERPL-SCNC: 135 MMOL/L (ref 133–144)
WBC # BLD AUTO: 7.7 10E9/L (ref 4–11)

## 2020-01-19 PROCEDURE — 99233 SBSQ HOSP IP/OBS HIGH 50: CPT | Performed by: INTERNAL MEDICINE

## 2020-01-19 PROCEDURE — 80053 COMPREHEN METABOLIC PANEL: CPT | Performed by: INTERNAL MEDICINE

## 2020-01-19 PROCEDURE — 25000132 ZZH RX MED GY IP 250 OP 250 PS 637: Performed by: STUDENT IN AN ORGANIZED HEALTH CARE EDUCATION/TRAINING PROGRAM

## 2020-01-19 PROCEDURE — 25000128 H RX IP 250 OP 636: Performed by: NURSE PRACTITIONER

## 2020-01-19 PROCEDURE — 12000012 ZZH R&B MS OVERFLOW UMMC

## 2020-01-19 PROCEDURE — 25000128 H RX IP 250 OP 636: Performed by: PSYCHIATRY & NEUROLOGY

## 2020-01-19 PROCEDURE — 25000132 ZZH RX MED GY IP 250 OP 250 PS 637: Performed by: NURSE PRACTITIONER

## 2020-01-19 PROCEDURE — 99207 ZZC APP CREDIT; MD BILLING SHARED VISIT: CPT | Performed by: PHYSICIAN ASSISTANT

## 2020-01-19 PROCEDURE — 85610 PROTHROMBIN TIME: CPT | Performed by: PSYCHIATRY & NEUROLOGY

## 2020-01-19 PROCEDURE — 85027 COMPLETE CBC AUTOMATED: CPT | Performed by: INTERNAL MEDICINE

## 2020-01-19 PROCEDURE — 36415 COLL VENOUS BLD VENIPUNCTURE: CPT | Performed by: INTERNAL MEDICINE

## 2020-01-19 PROCEDURE — 25000132 ZZH RX MED GY IP 250 OP 250 PS 637: Performed by: PHYSICIAN ASSISTANT

## 2020-01-19 PROCEDURE — 25800030 ZZH RX IP 258 OP 636: Performed by: PSYCHIATRY & NEUROLOGY

## 2020-01-19 PROCEDURE — 86140 C-REACTIVE PROTEIN: CPT | Performed by: INTERNAL MEDICINE

## 2020-01-19 PROCEDURE — 95714 VEEG EA 12-26 HR UNMNTR: CPT | Mod: ZF

## 2020-01-19 PROCEDURE — 80053 COMPREHEN METABOLIC PANEL: CPT | Performed by: PHYSICIAN ASSISTANT

## 2020-01-19 RX ORDER — LEVETIRACETAM 500 MG/1
500 TABLET ORAL EVERY MORNING
Status: DISCONTINUED | OUTPATIENT
Start: 2020-01-20 | End: 2020-01-20 | Stop reason: HOSPADM

## 2020-01-19 RX ORDER — LEVETIRACETAM 500 MG/1
1000 TABLET ORAL AT BEDTIME
Status: DISCONTINUED | OUTPATIENT
Start: 2020-01-20 | End: 2020-01-20 | Stop reason: HOSPADM

## 2020-01-19 RX ADMIN — THERA TABS 1 TABLET: TAB at 09:05

## 2020-01-19 RX ADMIN — ACETAMINOPHEN 325 MG: 325 TABLET, FILM COATED ORAL at 14:59

## 2020-01-19 RX ADMIN — OMEPRAZOLE 20 MG: 20 CAPSULE, DELAYED RELEASE ORAL at 09:06

## 2020-01-19 RX ADMIN — POTASSIUM CHLORIDE 20 MEQ: 750 TABLET, EXTENDED RELEASE ORAL at 00:04

## 2020-01-19 RX ADMIN — Medication 100 MG: at 09:05

## 2020-01-19 RX ADMIN — FOLIC ACID 1 MG: 1 TABLET ORAL at 09:06

## 2020-01-19 RX ADMIN — CEFTRIAXONE 1 G: 1 INJECTION, POWDER, FOR SOLUTION INTRAMUSCULAR; INTRAVENOUS at 23:43

## 2020-01-19 RX ADMIN — OMEPRAZOLE 20 MG: 20 CAPSULE, DELAYED RELEASE ORAL at 17:13

## 2020-01-19 RX ADMIN — LEVETIRACETAM 2000 MG: 100 INJECTION, SOLUTION INTRAVENOUS at 17:56

## 2020-01-19 ASSESSMENT — PAIN DESCRIPTION - DESCRIPTORS
DESCRIPTORS: ACHING
DESCRIPTORS: DISCOMFORT

## 2020-01-19 ASSESSMENT — ACTIVITIES OF DAILY LIVING (ADL)
ADLS_ACUITY_SCORE: 10

## 2020-01-19 ASSESSMENT — MIFFLIN-ST. JEOR: SCORE: 1522.93

## 2020-01-19 NOTE — PLAN OF CARE
"Blood pressure 95/63, pulse 100, temperature 99.6  F (37.6  C), temperature source Oral, resp. rate 16, height 1.67 m (5' 5.75\"), weight 78.3 kg (172 lb 11.2 oz), SpO2 97 %.    Pt's temp max was 100.6 down to 99.6 after Tylenol given. HR continue to be tachycardic, 105 to 100 bpm. BP soft this morning, 106/60 to 95/63. 02 saturations in the upper 90% on roomair.Pt continue to report passing menstrual clots On bed alarm and calls appropriately to use the bathroom with stand-by-assist. A/O x 4. Potassium chloride po 20 mEq given for potassium of 3.8 last evening. No labs ordered and MD notified and put in order for CBC. No seizure activities noted. EEG leads intact. PIV NSL'ed. Continue to monitor pt and continue with plan of care.      Problem: Adult Inpatient Plan of Care  Goal: Plan of Care Review  1/19/2020 0539 by Anna Altman RN  Outcome: No Change  1/18/2020 1842 by Rosangela Santana RN  Outcome: No Change     Problem: Adult Inpatient Plan of Care  Goal: Optimal Comfort and Wellbeing  Outcome: No Change     Problem: Seizure, Active Management  Goal: Absence of Seizure/Seizure-Related Injury  1/19/2020 0539 by Anna Altman RN  Outcome: No Change  1/18/2020 1842 by Rosangela Santana RN  Outcome: No Change     Problem: Cardiac Disease Comorbidity  Goal: Cardiac Disease  Description  Patient comorbidity will be monitored for signs and symptoms of Cardiac Disease.  Problems will be absent, minimized or managed by discharge/transition of care.  Outcome: No Change       "

## 2020-01-19 NOTE — PROCEDURES
Procedure Date: 2020      EEG #-4 (Video EEG day 4)       DATE OF RECORDING/SERVICE DATE:  2020       SOURCE FILE DURATION:  23 hours and 42 minutes.      HISTORY:  Kota Cowan is a 29-year-old right-handed female with a history of alcohol abuse and seizure disorder.  Her seizures started at the age of 26.  Video EEG is requested for characterization of seizures.      TECHNICAL SUMMARY: This video EEG monitoring procedure was performed with 23 scalp electrodes in 10-20 system placements, and additional scalp, precordial and other surface electrodes used for electrical referencing and artifact detection. Video was reviewed intermittently by EEG technologist and physician for electroclinical seizures.      BACKGROUND ACTIVITY:  During wakefulness, the background activity consists of synchronous and symmetric, well modulated, 8-9 Hz posterior dominant rhythm. The posterior dominant rhythm attenuated with eye opening. During drowsiness, the background activity waxed and waned and there were periods of slowing and attenuation of the posterior alpha rhythm. Stage I sleep, Stage II sleep and Stage 3 sleep  was recorded in which synchronous and symmetrical vertex waves , K-complexes and sleep spindles  were identified.  No focal slowing was seen.        ACTIVATION PROCEDURES:  Photic stimulation was not done.  Hyperventilation done with good effort.      EPILEPTIFORM DISCHARGES:  None.      ICTAL:  None.      IMPRESSION:  Video EEG day 4 is normal.   No electrographic seizure, paroxsymal behaviors, or epileptiform discharges were seen.         VICKIE MARTÍNEZ MD             D: 2020   T: 2020   MT: WT      Name:     KOTA COWAN   MRN:      [id number]        Account:        [id number]   :      1990           Procedure Date: 2020      Document: [id number]

## 2020-01-19 NOTE — PLAN OF CARE
Tmax: 100.6, slightly tachycardic (), OVSS. Pt with large volume menstrual bleeding. Passing clots approximately 5-6 cm in size. Received 3 units PRBC (one currently running and 2 units FFP today. On q 6 hr hgb checks. Last Hgb 6.7. Went down for transvaginal ultrasound which didn't show any structural abnormalities. Bleeding thought to be caused by liver disease. 80 mEq K+ replaced, recheck due around 2130. Will continue to monitor.     Problem: Adult Inpatient Plan of Care  Goal: Plan of Care Review  1/18/2020 1842 by Rosangela Santana, RN  Outcome: No Change     Problem: Adult Inpatient Plan of Care  Goal: Patient-Specific Goal (Individualization)  1/18/2020 1842 by Rosangela Santana, RN  Outcome: No Change     Problem: Seizure, Active Management  Goal: Absence of Seizure/Seizure-Related Injury  1/18/2020 1842 by Rosangela Santana, RN  Outcome: No Change

## 2020-01-19 NOTE — CONSULTS
[facility], [place]    Medicine Progress Note - Hospitalist Service, Gold 7       Date of Admission:  1/15/2020  Assessment & Plan    Kota is a 28 yo female with hx of etoh abuse, etoh induced pancreatitis, and recurrent sz's admitted to Neurology service to undergo characterization of her seizures. Internal medicine consulted to eval pt's c/o acute abd pain within context of elevated LFTs and recent etoh abuse.     Plan for today:  - Continue empiric ceftriaxone   - Obtain repeat CRP   - Follow up results of BCs and para cultures all NGTD    # Acute on chronic, recurrent abd pain  # Acute on chronic severe etoh abuse  # Abnormal LFTs most likely 2/2 alcoholic hepatitis  # Elevated INR most likely 2/2 either etoh liver disease induced coagulopathy and/or malnutrition  Admitted with abd pain that persisted over the past week within the context of heavy daily etoh use, last used 1/14. States has had recurrent pain for a long time and usually occurs with etoh use. Drinking heavily at least since age 21. LFTs noted to be elevated in chart back to 2017. Denies assoc hematemesis, melena, hematochezia or coffee ground emesis. Does have frequent heartburn but denies treatment. RUQ abd US 1/15 with echogenic liver most likely due to hepatic steatosis. Also revealed mod amt of ascites but pt denies hx of ascites or [place]. Abd exam reveals minimal distention. Abd pain most likely consistent with etoh induced gastritis. Viral hepatitis screen neg. Declines wanting referral to CD, but agreeable to GI referral. Most recent LFTs since this admission: Total bili 2.1 (3.9), Alk phos WNL, ALT WNL, AST 83 (90). INR on downward trend since giving pt one time dose (5mg) of Vit K on 1/16 and 2 units FFP 1/18. Has had recurrent low grade fevers since night on 1/16 with procal ~7 concerning for SBP and pt started empirically on ceftriaxone; however, underwent para 1/17 am with 1154 WBC but PMN count 115.    MELD-Na score: 15 at 1/19/2020  8:21 AM  MELD score: 13 at 1/19/2020  8:21 AM  Calculated from:  Serum Creatinine: 0.46 mg/dL (Rounded to 1 mg/dL) at 1/19/2020  8:21 AM  Serum Sodium: 135 mmol/L at 1/19/2020  8:21 AM  Total Bilirubin: 2.1 mg/dL at 1/19/2020  8:21 AM  INR(ratio): 1.41 at 1/19/2020  8:21 AM  Age: 29 years     - GI consulted and appreciate recommendations.   - Continue empiric ceftriaxone  - Strongly encourage etoh cessation after discharge.   - Continue CIWA protocol for s/s of etoh withdrawal.  - Continue BID PPI   - Repeat hepatic panel and INR tomorrow am  - Continue with oral thiamine and folate supplementation  - GI referral after discharge (GI has given pt a GI specialist in [place] close to where she lives).   - Follow up with PCP after discharge to discuss possibly entering OP CD tx program and/or starting medication to decrease etoh cravings.    # SIRS: Pt developed fever up to 101F and tachycardia night of 1/16 with concurrent procal ~7 concerning for SBP, but ascites analysis from para 1/17 <250 neuts. BCs NGTD. Started on ceftriaxone. WBC intermittently elevated; however, CXR neg. UA neg for infection. Pt denies sxs of infection. Repeat procal 1/18 2.56. CRP bordeline elevated to 21. Repeat low grade fever this am to 100.7F. Question 2/2 alcoholic hepatitis vs early SBP    - Continue with empiric ceftriaxone  - Follow up results of outstanding BCs all NGTD  - Follow up results of cultures from paracentesis NGTD  - Trend CRP  - If fever recurs consider repeat diagnostic and therapeutic para.     # Abnormal vaginal bleeding  # Acute blood loss anemia  Pt states that one week ago started her period that continued until last Tues, then restarted heavily on Wed with large clots. Denies other abnormal vag sxs or abnormal urinary sxs. Hgb decreased to 5.4. D/w staff Ob/gyn and most likely due to pt's coagulopathy from cirrhosis as no structural abnormality noted from transvaginal U/S 1/18.  Given two units pRBCs and most recent Hgb stable 7.6. Into today pt states still having some bleeding but much less than days prior.   - Repeat Hgb tomorrow am  - Transfuse for Hgb<7.     # Hx of recurrent seizures: Management per primary team.      The patient's care was discussed with the Attending Physician, Dr. Wilkins, Bedside Nurse, Patient and Primary team.    Wilmer Mendes PA-C  Hospitalist Service, [address], [place]  Pager: [phone]  Please see sticky note for cross cover information  ______________________________________________________________________    Interval History   No acute events overnight. Still having recurrent low grade fevers that pt does not feel. Feels abnormal vaginal bleeding has stopped with last noted bleeding yesterday. Denies abd pain and nausea. Denies HA and other acute physical concerns at this time.     Data reviewed today: I reviewed all medications, new labs and imaging results over the last 24 hours.     Physical Exam   Vital Signs: Temp: 99.8  F (37.7  C) Temp src: Oral BP: 109/66 Pulse: 95 Heart Rate: 95 Resp: 16 SpO2: 100 % O2 Device: None (Room air)    Weight: 173 lbs 1.6 oz  GEN: In NAD  HEENT: NCAT; PERRL; sclerae non-icteric  LUNGS: CTAB  CV: RRR  ABD: +BSs; SNTND  EXT: No BLE edema  SKIN: No acute rashes noted on exposed areas.  NEURO: AAOx3; CNs grossly intact; No acute focal deficits noted.      Data   Recent Results (from the past 24 hour(s))   US Pelvic Complete with Transvaginal    Narrative    EXAMINATION: US PELVIC COMPLETE WITH TRANSVAGINAL, 1/18/2020 2:45 PM     COMPARISON: Abdominal ultrasound 1/16/2020    HISTORY: please eval for structural abnormalities causing abnormal  vaginal bleeding    TECHNIQUE: The pelvis was scanned in standard fashion with  transabdominal and transvaginal transducer(s) using both grey scale  and limited color Doppler techniques.    FINDINGS:  The uterus measures 7.9 x 3.4 x 3.0. There is no  evidence of a focal  fibroid. Uterus is retroverted. The endometrium is within normal  limits and measures 0.2 cm. There is no free fluid in the pelvis.    The right ovary measures 1.7 x 3.4 x 2.2 cm and the left ovary  measures 1.7 x 3.5 x 2.0 cm. There is no adnexal mass. There is normal  blood flow to the ovaries.    Moderate to large simple free fluid in the pelvis.      Impression    IMPRESSION:   1. No findings to explain patient's heavy vaginal bleeding.  2. Moderate to large volume free fluid in the pelvis. This is presumed  to be ascites given the patient's history of alcohol use. The amount  of ascites appears to be increased when compared to previous abdominal  ultrasound dated 1/16/2020.    [Result: Increase in ascites]    Finding was identified on 1/18/2020 2:53 PM.     Wilmer Pepin was contacted by Dr. Hendrix at 1/18/2020 3:20 PM  and verbalized understanding of the urgent finding.      I have personally reviewed the examination and initial interpretation  and I agree with the findings.    LISETTE DIAZ MD     CMP  Recent Labs   Lab 01/19/20  0821 01/18/20  2233 01/18/20  1215 01/17/20  0456 01/16/20  0132 01/15/20  1113    129* 131*  --  135 134   POTASSIUM 4.2 3.8 2.9*  --  3.4 3.1*   CHLORIDE 106 101 98  --  102 101   CO2 22 24 26  --  27 24   ANIONGAP 6  --  6  --  5 9   GLC 90 117* 114*  --  91 87   BUN 3* 4* 5*  --  6* 4*   CR 0.46* 0.42* 0.46*  --  0.36* 0.37*   GFRESTIMATED >90 >90 >90  --  >90 >90   GFRESTBLACK >90 >90 >90  --  >90 >90   SAMIRA 7.3* 7.1* 7.1*  --  7.2* 7.8*   MAG  --   --   --   --  1.7  --    PROTTOTAL 5.0* 5.1* 5.1* 5.3* 5.7* 6.5*   ALBUMIN 2.0* 2.0* 2.1* 2.0* 2.1* 2.6*   BILITOTAL 2.1* 3.4* 3.9* 2.3* 2.2* 1.6*   ALKPHOS 148 144 146 175* 197* 230*   AST 83* 90* 83* 170* 220* 264*   ALT 29 31 36 51* 61* 72*     CBC  Recent Labs   Lab 01/19/20  0557 01/18/20  2233 01/18/20  1516 01/18/20  0342 01/18/20  0245 01/15/20  1113   WBC 7.7  --   --  11.6* 11.6* 7.2    RBC 2.39*  --   --  1.46* 1.50* 2.62*   HGB 7.6* 7.4* 6.7* 5.4* 5.6* 9.8*   HCT 22.4*  --   --  16.3* 17.5* 29.5*   MCV 94  --   --  112* 117* 113*   MCH 31.8  --   --  37.0* 37.3* 37.4*   MCHC 33.9  --   --  33.1 32.0 33.2   RDW 26.8*  --   --  14.5 14.6 14.3   *  --   --  189 183 150     INR  Recent Labs   Lab 01/19/20  0821 01/18/20  1215 01/17/20  0456 01/16/20  0132   INR 1.41* 1.47* 2.03* 1.85*

## 2020-01-19 NOTE — PLAN OF CARE
Tmax: 100.7, intermittent tachycardic (), OVSS. Denies pain. No seizure activity noted today. Menstrual bleeding is decreasing with absence of clots per pt report. Plan to restart keppra this evening and discharge tomorrow. Will continue to monitor.     Problem: Adult Inpatient Plan of Care  Goal: Plan of Care Review  1/19/2020 1728 by Rosangela Santana, RN  Outcome: No Change     Problem: Adult Inpatient Plan of Care  Goal: Patient-Specific Goal (Individualization)  1/19/2020 1728 by Rosangela Santana RN  Outcome: No Change     Problem: Seizure, Active Management  Goal: Absence of Seizure/Seizure-Related Injury  1/19/2020 1728 by Rosangela Santana RN  Outcome: No Change     Problem: Hematological Disorder  Goal: Hematological Disorder  Description  Patient comorbidity will be monitored for signs and symptoms of Hematogical condition.  Problems will be absent, minimized or managed by discharge/transition of care.  Outcome: Improving

## 2020-01-19 NOTE — PROGRESS NOTES
"[facility], [place]   Epilepsy Service Daily Note      Interval History:   No target spells. Overnight she had no seizures.      Review of System:   No nausea, No vomiting, no headaches, no dizziness, no chest pain.     Medications:   Antiepileptic Medications Home Doses: levetiracetam 500-1000  Antiepileptic Medications Current Doses: none     Exam: Blood pressure 92/58, pulse 100, temperature 100.7  F (38.2  C), temperature source Oral, resp. rate 16, height 1.67 m (5' 5.75\"), weight 78.5 kg (173 lb 1.6 oz), SpO2 98 %.   General: NAD  Head: NC/AT  Abdomen: soft, mild tenderness, not significantly distended   Neuro: Alert and oriented. Speech fluent. Hearing intact to normal conversation. Pupils equal, round and reactive to light. EOM's intact. VFF. Face symmetric, tongue midline. Strength 5/5 bilaterally. No drift or pronation. Intact FNF. Sensation grossly intact to light touch.   EEG: right temporal epileptiform discharges   Assessment and Plan:   Patient is a 29 year old, right-handed female with a history of alcohol abuse, alcohol induced pancreatitis and hepatitis as well as anemia who is being admitted for characterization of seizures. One target spell with weird smell but this did not progress. No electrographic seizures yet. She has right temporal epileptiform discharges suggestive of cortical irritability in this region, no overt EEG seizure yet.  Patient would like to be discharged tomorrow.  I will load her with levetiracetam 2000 mg IV load today and restart her 500 mg in the morning and at thousand milligrams dose tomorrow she was encouraged to use a pill pack at home to prevent missed seizure medications because this is usually when she has breakthrough seizures.  She states that her seizures are controlled on 1500 mg of levetiracetam per day additionally I encouraged her to follow-up with hepatology and chemical dependency specialist for mental health.  She is agreeable " to this.  She would like to follow-up with chemical dependency specialist that hepatology had recommended.  Rajat Martinez will determine care coordination upon discharge.    -continue vEEG monitoring  - restart levetiracetam today (1000 mg at night) with additional 2000 mg IV load. Then tomorrow resume home dose of 500-1000.    - declined inpatient psychiatry consult, but will think about outpatient chemical dependency      2. Alcohol abuse with abnormal LFTs, increased INR (1/17 2.03), she has fevers and tachycardia for several days and has been anemic requiring FFP and blood transfusion.  Medicine team is following. Vaginal US completed yesterday to evaluate bleeding. Diagnostic paracentesis done  (1/17).   Hepatology is also following.      -continue management per medicine and GI team   -continue MSSA (last alcoholic beverage 1/14)    Melinda Canada MD     Total time: 25 minute was spent in the care of this patient. The patient agrees with the above mentioned plan of care. I answered all the patient's questions and addressed immediate concerns. More than 50% of time spent consisted of counseling and coordinating care, including discussion of the diagnostic significance of EEG findings, anti-seizure medication management, and planning for discharge home

## 2020-01-20 ENCOUNTER — ALLIED HEALTH/NURSE VISIT (OUTPATIENT)
Dept: NEUROLOGY | Facility: CLINIC | Age: 30
End: 2020-01-20
Attending: PSYCHIATRY & NEUROLOGY
Payer: COMMERCIAL

## 2020-01-20 VITALS
HEIGHT: 66 IN | RESPIRATION RATE: 16 BRPM | HEART RATE: 110 BPM | BODY MASS INDEX: 25.54 KG/M2 | DIASTOLIC BLOOD PRESSURE: 71 MMHG | SYSTOLIC BLOOD PRESSURE: 108 MMHG | TEMPERATURE: 98.9 F | OXYGEN SATURATION: 93 % | WEIGHT: 158.9 LBS

## 2020-01-20 DIAGNOSIS — R56.9 SEIZURES (H): Primary | ICD-10-CM

## 2020-01-20 DIAGNOSIS — K70.11 ALCOHOLIC HEPATITIS WITH ASCITES (H): Primary | ICD-10-CM

## 2020-01-20 LAB
ALBUMIN SERPL-MCNC: 1.9 G/DL (ref 3.4–5)
ALP SERPL-CCNC: 153 U/L (ref 40–150)
ALT SERPL W P-5'-P-CCNC: 31 U/L (ref 0–50)
ANION GAP SERPL CALCULATED.3IONS-SCNC: 5 MMOL/L (ref 3–14)
AST SERPL W P-5'-P-CCNC: 89 U/L (ref 0–45)
BILIRUB SERPL-MCNC: 1.4 MG/DL (ref 0.2–1.3)
BUN SERPL-MCNC: 3 MG/DL (ref 7–30)
CALCIUM SERPL-MCNC: 7.2 MG/DL (ref 8.5–10.1)
CHLORIDE SERPL-SCNC: 106 MMOL/L (ref 94–109)
CO2 SERPL-SCNC: 23 MMOL/L (ref 20–32)
CREAT SERPL-MCNC: 0.42 MG/DL (ref 0.52–1.04)
ERYTHROCYTE [DISTWIDTH] IN BLOOD BY AUTOMATED COUNT: 26.9 % (ref 10–15)
GFR SERPL CREATININE-BSD FRML MDRD: >90 ML/MIN/{1.73_M2}
GLUCOSE SERPL-MCNC: 91 MG/DL (ref 70–99)
HCT VFR BLD AUTO: 21.7 % (ref 35–47)
HGB BLD-MCNC: 7.1 G/DL (ref 11.7–15.7)
INR PPP: 1.38 (ref 0.86–1.14)
MCH RBC QN AUTO: 31.7 PG (ref 26.5–33)
MCHC RBC AUTO-ENTMCNC: 32.7 G/DL (ref 31.5–36.5)
MCV RBC AUTO: 97 FL (ref 78–100)
PLATELET # BLD AUTO: 149 10E9/L (ref 150–450)
POTASSIUM SERPL-SCNC: 3.6 MMOL/L (ref 3.4–5.3)
PROT SERPL-MCNC: 5.1 G/DL (ref 6.8–8.8)
RBC # BLD AUTO: 2.24 10E12/L (ref 3.8–5.2)
SODIUM SERPL-SCNC: 134 MMOL/L (ref 133–144)
WBC # BLD AUTO: 6.5 10E9/L (ref 4–11)

## 2020-01-20 PROCEDURE — 80053 COMPREHEN METABOLIC PANEL: CPT | Performed by: PHYSICIAN ASSISTANT

## 2020-01-20 PROCEDURE — 36415 COLL VENOUS BLD VENIPUNCTURE: CPT | Performed by: PHYSICIAN ASSISTANT

## 2020-01-20 PROCEDURE — 95711 VEEG 2-12 HR UNMONITORED: CPT | Mod: ZF

## 2020-01-20 PROCEDURE — 90632 HEPA VACCINE ADULT IM: CPT | Performed by: PHYSICIAN ASSISTANT

## 2020-01-20 PROCEDURE — 90686 IIV4 VACC NO PRSV 0.5 ML IM: CPT | Performed by: PSYCHIATRY & NEUROLOGY

## 2020-01-20 PROCEDURE — 85027 COMPLETE CBC AUTOMATED: CPT | Performed by: PHYSICIAN ASSISTANT

## 2020-01-20 PROCEDURE — 85610 PROTHROMBIN TIME: CPT | Performed by: PHYSICIAN ASSISTANT

## 2020-01-20 PROCEDURE — 25000132 ZZH RX MED GY IP 250 OP 250 PS 637: Performed by: PHYSICIAN ASSISTANT

## 2020-01-20 PROCEDURE — 99207 ZZC APP CREDIT; MD BILLING SHARED VISIT: CPT | Performed by: PHYSICIAN ASSISTANT

## 2020-01-20 PROCEDURE — 25000132 ZZH RX MED GY IP 250 OP 250 PS 637: Performed by: PSYCHIATRY & NEUROLOGY

## 2020-01-20 PROCEDURE — 25000128 H RX IP 250 OP 636: Performed by: PHYSICIAN ASSISTANT

## 2020-01-20 PROCEDURE — 25000132 ZZH RX MED GY IP 250 OP 250 PS 637: Performed by: STUDENT IN AN ORGANIZED HEALTH CARE EDUCATION/TRAINING PROGRAM

## 2020-01-20 PROCEDURE — 99232 SBSQ HOSP IP/OBS MODERATE 35: CPT | Performed by: INTERNAL MEDICINE

## 2020-01-20 PROCEDURE — 25000132 ZZH RX MED GY IP 250 OP 250 PS 637: Performed by: NURSE PRACTITIONER

## 2020-01-20 PROCEDURE — 90746 HEPB VACCINE 3 DOSE ADULT IM: CPT | Performed by: PHYSICIAN ASSISTANT

## 2020-01-20 PROCEDURE — 25000128 H RX IP 250 OP 636: Performed by: PSYCHIATRY & NEUROLOGY

## 2020-01-20 RX ORDER — MULTIVITAMIN,THERAPEUTIC
1 TABLET ORAL DAILY
Qty: 90 TABLET | Refills: 1 | Status: SHIPPED | OUTPATIENT
Start: 2020-01-21 | End: 2021-01-18

## 2020-01-20 RX ORDER — LEVETIRACETAM 500 MG/1
TABLET ORAL
Qty: 270 TABLET | Refills: 1 | Status: SHIPPED | OUTPATIENT
Start: 2020-01-20 | End: 2020-09-08

## 2020-01-20 RX ORDER — LANOLIN ALCOHOL/MO/W.PET/CERES
100 CREAM (GRAM) TOPICAL DAILY
Qty: 30 TABLET | Refills: 0 | Status: ON HOLD | OUTPATIENT
Start: 2020-01-21 | End: 2020-10-11

## 2020-01-20 RX ORDER — FOLIC ACID 1 MG/1
1 TABLET ORAL DAILY
Qty: 90 TABLET | Refills: 1 | Status: ON HOLD | OUTPATIENT
Start: 2020-01-21 | End: 2020-10-11

## 2020-01-20 RX ADMIN — POTASSIUM CHLORIDE 20 MEQ: 750 TABLET, EXTENDED RELEASE ORAL at 08:16

## 2020-01-20 RX ADMIN — HEPATITIS B VACCINE (RECOMBINANT) 20 MCG: 20 INJECTION, SUSPENSION INTRAMUSCULAR at 13:59

## 2020-01-20 RX ADMIN — THERA TABS 1 TABLET: TAB at 08:16

## 2020-01-20 RX ADMIN — OMEPRAZOLE 20 MG: 20 CAPSULE, DELAYED RELEASE ORAL at 08:16

## 2020-01-20 RX ADMIN — INFLUENZA A VIRUS A/BRISBANE/02/2018 IVR-190 (H1N1) ANTIGEN (FORMALDEHYDE INACTIVATED), INFLUENZA A VIRUS A/KANSAS/14/2017 X-327 (H3N2) ANTIGEN (FORMALDEHYDE INACTIVATED), INFLUENZA B VIRUS B/PHUKET/3073/2013 ANTIGEN (FORMALDEHYDE INACTIVATED), AND INFLUENZA B VIRUS B/MARYLAND/15/2016 BX-69A ANTIGEN (FORMALDEHYDE INACTIVATED) 0.5 ML: 15; 15; 15; 15 INJECTION, SUSPENSION INTRAMUSCULAR at 10:45

## 2020-01-20 RX ADMIN — Medication 100 MG: at 08:16

## 2020-01-20 RX ADMIN — LEVETIRACETAM 500 MG: 500 TABLET ORAL at 06:11

## 2020-01-20 RX ADMIN — HEPATITIS A VACCINE 1440 UNITS: 1440 INJECTION, SUSPENSION INTRAMUSCULAR at 14:04

## 2020-01-20 RX ADMIN — ACETAMINOPHEN 325 MG: 325 TABLET, FILM COATED ORAL at 10:41

## 2020-01-20 RX ADMIN — FOLIC ACID 1 MG: 1 TABLET ORAL at 08:16

## 2020-01-20 ASSESSMENT — ACTIVITIES OF DAILY LIVING (ADL)
ADLS_ACUITY_SCORE: 10

## 2020-01-20 ASSESSMENT — MIFFLIN-ST. JEOR: SCORE: 1458.52

## 2020-01-20 NOTE — PROGRESS NOTES
[facility], [place]    Medicine Progress Note - Hospitalist Service, Gold 7       Date of Admission:  1/15/2020  Assessment & Plan   Kota Cowan is a 29 year old female with history of alcohol abuse, alcohol induced pancreatitis, and seizure disorder who was admitted to [facility] neurology service for epilepsy workup. Medicine consulted due to transaminitis and for assistance with medical management     Recurrent seizures: Admitted to neurology for seizure workup. Management per their team    Alcoholic hepatitis, acute on chronic abdominal pain, severe alcohol abuse: Last etoh 1/14. Underreported etoh abuse on admission. RUQ US 1/15 echogenic liver, likely hepatic steatosis, moderate ascites. Mild distention on exam. Viral hepatitis screens negative. Declines CD referral. See medicine note 1/19 for complete details. Received hepatitis A and B initial vaccinations prior to discharge. LFTs stable: AST 89 (83), ALT 31 (29), Tbili 1.4 (2.1),  (148). MELD 11  - GI consulted this admission, follow up with their team in 1 month   - Strongly encourage etoh cessation   - Continue PPI, thiamine and folate on discharge   - Repeat LFTs 1/23 OP  - Complete hepatitis A and B vaccination per PCP   - Follow up with PCP OP to discuss ongoing care and possible CD treatment     SIRS: Tmax 101 with tachycardia 1/16. Procal elevated to around 7. Para 1/17 <250 neutrophils. BCx NGTD. CXR negative. UA no e/o infection. Tmax 100.7 1/19, now afebrile >24 hours. Treated with ceftriaxone 1/16 to present. Etiology most likely 2/2 acute hepatitis  - Stop Ceftriaxone, hold further antibiotics for now  - Follow cultures   - Repeat infectious workup if febrile     Acute blood loss anemia, heavy vaginal bleeding: Menses stopped around 1/14/2020, then started having heavy vaginal bleeding with clot 1/15. Hgb low to 5.4, now s/p 2U RBC. Transvaginal US 1/18 no acute findings. Hgb stable 7.1 (7.6) with scant  vaginal bleeding. Trend hgb with PCP 1/23    Diet: Regular Diet Adult    DVT Prophylaxis: Defer to primary service  Rick Catheter: not present  Code Status: Full Code      Disposition Plan   Expected discharge: Today, recommended to prior living arrangement once per primary team.  Entered: Yesika Rodriguez PA-C 01/20/2020, 10:23 AM       The patient's care was discussed with the patient, GI and attending physician, Dr. Franky Rodriguez PA-C  Hospitalist Service, [address]  Pager: [phone]  Please see sticky note for cross cover information  ______________________________________________________________________    Interval History   Feeling well. Denies fever or chills. Mild constipation. No new abdominal pain or tenderness. No fever or chills. Denies urinary symptoms. No confusion. No cough, dyspnea, or chest pain     Data reviewed today: I reviewed all medications, new labs and imaging results over the last 24 hours    Physical Exam   Vital Signs: Temp: 99.7  F (37.6  C) Temp src: Oral BP: 108/77 Pulse: 112 Heart Rate: 112 Resp: 16 SpO2: 99 % O2 Device: None (Room air)    Weight: 158 lbs 14.4 oz  General Appearance: Alert and oriented x3  Respiratory: CTAB without wheezing or rales  Cardiovascular: RRR, S1, S2. No murmur noted  GI: Abdomen soft, non-tender with active bowel sounds. Mild distention. No guarding or rebound   Skin: No jaundice or rash   Other: No icterus. BLE trace pitting edema. Distal pulses palpable      Data   Recent Labs   Lab 01/20/20  0255 01/19/20  0821 01/19/20  0557 01/18/20  2233  01/18/20  1215 01/18/20  0342   WBC 6.5  --  7.7  --   --   --  11.6*   HGB 7.1*  --  7.6* 7.4*   < >  --  5.4*   MCV 97  --  94  --   --   --  112*   *  --  130*  --   --   --  189   INR 1.38* 1.41*  --   --   --  1.47*  --     135  --  129*  --  131*  --    POTASSIUM 3.6 4.2  --  3.8  --  2.9*  --    CHLORIDE 106 106  --  101  --   98  --    CO2 23 22  --  24  --  26  --    BUN 3* 3*  --  4*  --  5*  --    CR 0.42* 0.46*  --  0.42*  --  0.46*  --    ANIONGAP 5 6  --   --   --  6  --    SAMIRA 7.2* 7.3*  --  7.1*  --  7.1*  --    GLC 91 90  --  117*  --  114*  --    ALBUMIN 1.9* 2.0*  --  2.0*  --  2.1*  --    PROTTOTAL 5.1* 5.0*  --  5.1*  --  5.1*  --    BILITOTAL 1.4* 2.1*  --  3.4*  --  3.9*  --    ALKPHOS 153* 148  --  144  --  146  --    ALT 31 29  --  31  --  36  --    AST 89* 83*  --  90*  --  83*  --     < > = values in this interval not displayed.     No results found for this or any previous visit (from the past 24 hour(s)).

## 2020-01-20 NOTE — PROCEDURES
Procedure Date: 2020      EEG #-6       DATE OF RECORDING/SERVICE DATE:  2020       SOURCE FILE DURATION:  11 hours and 53 minutes.      DAY #6 OF VIDEO EEG MONITORING      CLINICAL HISTORY:  This patient is a 29-year-old right-handed woman with history of seizures who presented for further evaluation.  EEG was requested for further characterization of her seizure spells.      CURRENT MEDICATIONS:  Keppra, thiamine.      TECHNICAL SUMMARY: This continuous video- EEG monitoring procedure was performed with 23 scalp electrodes in 10-20 electrode system placements, and additional scalp, precordial and other surface electrodes used for electrical referencing and artifact detection.  Video monitoring was utilized and periodically reviewed by EEG technologists and the physician for electroclinical correlations.     BACKGROUND ACTIVITIES:  The background activities of this EEG were symmetric and consisted of 10 Hz posterior dominant rhythm, which attenuates with eye opening during maximal wakefulness with moderate amplitude.  Hyperventilation and photic stimulation were not performed.    There were rare right temporal epileptiform activities observed during this recording.      ICTAL ACTIVITIES:  No seizures or spells were recorded.      SUMMARY OF DAY #6 OF VIDEO EEG MONITORING:  This EEG is mildly abnormal due to the presence of rare right temporal epileptiform activities.  No seizures or target spells were recorded.      SUMMARY OF 6 DAYS OF VIDEO EEG MONITORING:  During these 6 days of video EEG monitoring, EEG was mildly abnormal due to the presence of rare to occasional right temporal epileptiform activities.  No seizures or target spells were recorded.         LEI KLEIN MD             D: 2020   T: 2020   MT: WT      Name:     ANTWON CLIFTON   MRN:      [id number]        Account:        [id number]   :      1990           Procedure Date: 2020      Document: [id number]

## 2020-01-20 NOTE — PLAN OF CARE
"/68 (BP Location: Left arm)   Pulse 103   Temp 99  F (37.2  C) (Oral)   Resp 16   Ht 1.67 m (5' 5.75\")   Wt 78.5 kg (173 lb 1.6 oz)   SpO2 97%   BMI 28.15 kg/m     VSS, Tmax 99.7. No pain, pt just states that her abdomen feels full. No N/V/D. Menses bleeding continues to decrease, no reported clots. MSSA score has been 5. Will need 20 mEq of K+ tablet this AM. Keppra restarted yesterday, got her AM dose. Will be discharging today, S.O is suppose to pick pt up around 1530. Stand by assist to the bathroom, bed alarm activated. Seizure precautions, no seizure activity noted. Continued EEG monitoring. Continue with POC.     Problem: Adult Inpatient Plan of Care  Goal: Plan of Care Review  1/20/2020 0504 by Narayan Giles, RN  Outcome: No Change     Problem: Adult Inpatient Plan of Care  Goal: Patient-Specific Goal (Individualization)  1/20/2020 0504 by Narayan Giles, RN  Outcome: No Change     Problem: Adult Inpatient Plan of Care  Goal: Absence of Hospital-Acquired Illness or Injury  Outcome: No Change     Problem: Adult Inpatient Plan of Care  Goal: Optimal Comfort and Wellbeing  Outcome: No Change     Problem: Seizure, Active Management  Goal: Absence of Seizure/Seizure-Related Injury  1/20/2020 0504 by Narayan Giles, RN  Outcome: No Change     Problem: Hematological Disorder  Goal: Hematological Disorder  Description  Patient comorbidity will be monitored for signs and symptoms of Hematogical condition.  Problems will be absent, minimized or managed by discharge/transition of care.  1/20/2020 0504 by Narayan Giles, RN  Outcome: No Change     "

## 2020-01-20 NOTE — TELEPHONE ENCOUNTER
amand  RECORDS RECEIVED FROM: Internal - EtOH cirrhosis.Ref Dennis Rodriguez    DATE RECEIVED: 02.05.2020   NOTES STATUS DETAILS   OFFICE NOTE from referring provider Internal 01.15.2020 Dennis Rodriguez MD   OFFICE NOTES from other specialists N/A    DISCHARGE SUMMARY from hospital Internal  Care Everywhere 01.15.2020    01.03.2017   MEDICATION LIST Internal / CE    LIVER BIOSPY (IF APPLICABLE)      PATHOLOGY REPORTS  N/A    IMAGING     ENDOSCOPY (IF AVAILABLE) N/A    COLONOSCOPY (IF AVAILABLE) N/A    ULTRASOUND LIVER Internal  Care Everywhere 01.16.2020 01.18.2019 [facility]  02.13.2018 [facility]   CT OF ABDOMEN Care Everywhere 04.02.2017 [facility]   MRI OF LIVER N/A    FIBROSCAN, US ELASTOGRAPHY, FIBROSIS SCAN, MR ELASTOGRAPHY N/A    LABS     HEPATIC PANEL (LIVER PANEL) Internal 01.18.2020   BASIC METABOLIC PANEL Care Everywhere 02.05.2019   COMPLETE METABOLIC PANEL Care Everywhere 04.25.2019   COMPLETE BLOOD COUNT (CBC) Internal 01.20.2020   INTERNATIONAL NORMALIZED RATIO (INR) Internal 01.20.2020   HEPATITIS C ANTIBODY Internal 01.16.2020   HEPATITIS C VIRAL LOAD/PCR N/A    HEPATITIS C GENOTYPE N/A    HEPATITIS B SURFACE ANTIGEN Internal 01.16.2020   HEPATITIS B SURFACE ANTIBODY N/A    HEPATITIS B DNA QUANT LEVEL N/A    HEPATITIS B CORE ANTIBODY N/A

## 2020-01-20 NOTE — DISCHARGE SUMMARY
"[facility]  EPILEPSY SERVICE DISCHARGE SUMMARY    Patient Name:  Kota Cowan  MRN:  [id number]      :  1990      Date of Admission:  1/15/2020  Date of Discharge::  2020  Admitting Physician:  Renee Vázquez MD  Discharge Physician:  GORDON Lopez, Dr. Lee   Primary Care Provider:   Samson Abraham  Discharge Disposition:   Discharged to home    Admission Diagnoses:  1. Seizures  2. Alcohol abuse, currently reports 4 alcoholic beverages a day   3. H/o alcohol induced pancreatitis and hepatitis   4. Eating disorder (restricting and purging)  5. History of Anemia     Discharge Diagnoses:    1. Focal epilepsy   2. Alcohol abuse  3. Alcoholic hepatitis with abdominal ascitics and coagulopathy   4. Anemia with acute blood loss, heavy vaginal bleeding  5. Eating disorder (restricting and purging)    Brief History of Illness:   Patient is a 29 year old, right-handed female with a history of alcohol abuse, alcohol induced pancreatitis and hepatitis as well as anemia who is being admitted for characterization of seizures.  She reports seizures started around age 25 or 26. She was in bed and her boyfriend noted she made a loud noise, was unresponsive and had whole body shaking. She did not bite her tongue or have incontinence. She was taken to ER for evaluation and recalls being told it was a \"febrile seizure\". She was not started on any medications at that time, seizures continued and she eventually started anti-seizure medications. She reports 3 seizure types:  Type 1: This is a weird smell, almost like a menthol cough drop. It is always the same. She feels shaking. Sometimes this stops here and other times progresses to type 2. These could happen multiple times a week to once a month.   Type 2: Most of the time this starts with above aura, but not always. She generally grabs a hold of something and arms are shaking. She has a blank stare and " is unresponsive. She will sometimes say things that don't make senes to the situation. These last about 1 minute. She is amnestic for what occurs. This happens about twice a month. After she is confused and tired.   Type 3: Type 2 can progress to full body shaking. She sometimes makes loud vocalization prior. She has not bitten her tongue or had incontinence. She reports has only had a few of these.    Hospital course:  No target seizures recorded despite discontinuation of PTA levetiracetam and sleep deprivation. EEG did show right temporal epileptiform discharges however.     Prior to discharge, her PTA levetiracetam 500-1000 was restarted. She denied any side effects or dizziness.     At the time of admission, patients LFT's were elevated and she complained of abdominal pain. Given her history of alcohol abuse, medicine was consulted and followed throughout admission. A RUQ ultrasound suggeted hepatic steatosis with moderate amount of abdominal ascites. Abdominal pain was felt likely consistent with etoh induced gastritis and Omeprazole BID started. She developed fevers and tachycardia on 1/16 with critically elevated procalcitonin (7). Blood cultures were obtained and she was started on IV Rocephin. Diagnostic paracentesis was done 1/17 as there was concern for spontaneous bacterial peritonitis, however labs were not consistent with that. Blood cultures showed no growth after 3 days. Her LFT's trended downward throughout admission. On 1/18, her hemoglobin was found to be 5.6. She reported heavy vaginal bleeding with clots. Her INR was found to be elevated during admission as well. She got a blood transfusion and FFP. On day of discharge hemoglobin was 7.1 and she was felt stable for discharge per medicine team.     Hepatology was consulted as it was felt she likely has new diagnosis of possible cirrhosis given presence of ascites and coagulopathy.     The importance of alcohol cessation was discussed numerous  "times with patient. She repeatedly declined psychiatry consult or chemical dependency referral. She will f[facility] with [facility] hepatology[facility]GI 2/5/2020. She is to have CBC, INR and LFT's checked with PCP 1/23/2020. She should complete Hep A and B vaccinations with PCP. During admission she was started on folate and thiamine as well as omeprazole.     Attending Physician (Dr. Lee) Summary and Plan:  \"29 year old, right-handed female with a history of alcohol abuse, alcohol induced pancreatitis and hepatitis as well as anemia who is being admitted for characterization of seizures. Seizures started around age 25 years old. She reports 3 seizure types: Type 1 involves a weird smell, like menthol cough drop. This happens multiple times a week to once a month. Type 2 will mostly start with the above aura, but not always. She will have a blank stare and is unresponsive. These last for about 1 minute. She is amnestic afterwards. This happens about twice a month. Type 3 is full body shaking. She had only a few of these.     She had VEEG monitoring for 6 days. During these 6 days of video EEG monitoring, EEG was mildly abnormal due to the presence of rare to occasional right temporal epileptiform activities.  No seizures or target spells were recorded despite of the fact that her keppra was tapered off and she was sleep deprived..      Although no seizures were recorded, based on history and EEG findings, patient most like has epilepsy. On discharge, her PTA levetiracetam 500-1000 was restarted.     Consultations:     Medicine, GI/Hepatology was consulted.  At the time of admission, patients LFT's were elevated and she complained of abdominal pain. Medicine was consulted for elevated LFT and abdominal pain. Ultrasound suggeted hepatic steatosis with moderate amount of abdominal ascites. Abdominal pain was felt likely consistent with ETOH induced gastritis and Omeprazole BID started. She developed fevers and tachycardia on 1/16 with " "critically elevated procalcitonin. Blood cultures were obtained and she was started on IV Rocephin. Diagnostic paracentesis was done 1/17 as there was concern for spontaneous bacterial peritonitis. Paracentesis was negative. Blood cultures was also negative after 3 days. Her LFT's trended downward throughout admission. On 1/18, her hemoglobin was found to be 5.6. She reported heavy vaginal bleeding with clots. Her INR was found to be elevated during admission as well. She received blood transfusion and FFP. On day of discharge hemoglobin was 7.1 and she was felt stable for discharge per medicine team.      She was given a new diagnosis of possible cirrhosis by Heptology given the presence of ascites and coagulopathy.      Discussed with the patient about alcohol cessation. Patient declined psychiatry and chemical dependence consults.     Patient will follow up with Dr. Ochoa in 4-6 weeks. She will follow up with GI in 4 weeks.     Patient was discharged with Keppra 500-1000, Omeprazole, Thiamine, folic acid.\"    Consultations:    Declined psychiatry or chemical dependency     Medicine consulted, see above    Hepatology consulted as it was felt she likely has new diagnosis of possible cirrhosis given presence of ascites and coagulopathy. They recommended f/u in 1 month after discharge.       Discharge Medications:    Current Discharge Medication List      START taking these medications    Details   folic acid (FOLVITE) 1 MG tablet Take 1 tablet (1 mg) by mouth daily  Qty: 90 tablet, Refills: 1    Associated Diagnoses: Alcohol use, unspecified with other alcohol-induced disorder (H)      multivitamin, therapeutic (THERA-VIT) TABS tablet Take 1 tablet by mouth daily  Qty: 90 tablet, Refills: 1    Associated Diagnoses: Alcohol use, unspecified with other alcohol-induced disorder (H)      omeprazole (PRILOSEC) 20 MG DR capsule Take 1 capsule (20 mg) by mouth 2 times daily (before meals)  Qty: 60 capsule, Refills: 0    " Associated Diagnoses: Alcohol use, unspecified with other alcohol-induced disorder (H)      vitamin B1 (THIAMINE) 100 MG tablet Take 1 tablet (100 mg) by mouth daily  Qty: 30 tablet, Refills: 0    Associated Diagnoses: Alcohol use, unspecified with other alcohol-induced disorder (H)         CONTINUE these medications which have CHANGED    Details   levETIRAcetam (KEPPRA) 500 MG tablet Take 1 tablet (500 mg) by mouth every morning AND 2 tablets (1,000 mg) every evening.  Qty: 270 tablet, Refills: 1    Associated Diagnoses: Focal epilepsy (H)         CONTINUE these medications which have NOT CHANGED    Details   LORazepam (ATIVAN) 1 MG tablet Take 1 mg by mouth every 6 hours as needed for anxiety or seizures             Discharge physical examination:   Vitals:  B/P: 108/77, T: 99.7, P: 112, R: 16  General:  Adult, in NAD, cooperative  HEENT:  NC/AT, no icterus  Cardiac:  RRR, no m/r/g  Chest:  CTAB  Abdomen:  S/NT, mild distention   Extremities:  Mild LE edema   Skin:  No rash or lesion.    Psych:  Mood pleasant, affect congruent  Neuro: Alert and oriented. Speech fluent. Hearing intact to normal conversation. Pupils equal, round and reactive to light. EOM's intact, VFF.  Face symmetric, tongue midline. Strong shoulder shrug bilaterally. Strength 5/5 bilaterally. No drift or pronation. Intact FNF. Sensation grossly intact to light touch.DTR's 2+ bilaterally. Gait normal, including tandem.     Discharge follow up and instructions:    1.  Diet:   Low sodium with adequate protein   2.  Activity:  State laws prohibit operating a motor vehicle following any seizure or other episode with loss of awareness, or inability to sit up (Varies by state, be aware and comply with the regulations applicable to you). State law may require the licensed  to report any future episode to the DMV . Avoid any activities that might lead to self-injury or injury of others following any event with impaired awareness or impaired motor  control. Such activities include but are not limited to holding babies or young children at heights from which they might be injured if dropped, bathing infants or young children in situations in which they might drown without continuous interactive care by an adult who is fully capable at all times during the bath, operating power cutting or other tools, handling firearms, exposure to heights from which you might fall, exposure to vessels with hot cooking oil or water, and swimming alone.  3.  Follow up:  Nurse call in 3-5 days. F/u with Dr. Ochoa 4-6 weeks. F/u with hepatology in 2/2020 as scheduled. Should f/u with PCP for CBC, INR and LFT's by 1/23. Should also f/u with PCP to complete Hep A and B vaccinations. F/u with PCP/ER if yellowing of skin, increased abdominal distension or pain, increased vaginal bleeding or fever.     GORDON Lopez    Total time: 35 minutes was spent in the care of this patient. The patient agrees with the above mentioned plan of care. I answered all the patient's questions and addressed immediate concerns. More than 50% of time spent consisted of counseling and coordinating care, including discussion of the diagnostic significance of EEG findings, anti-seizure medication management, and planning for discharge home

## 2020-01-20 NOTE — PROGRESS NOTES
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  - Liver tests trending toward normalization.    RECS:  - Chem Dep/Nneed for absolute sobriety indefinitely  -Strongly recommend she embrace chemical dependency evaluation and treatment  -Recommend paracentesis with diagnostic studies to evaluate etiology of her ascites  -Would recommend continuing education as to need for eating low-sodium diet indefinitely  -Recommend education as to need to eat adequate amounts of protein daily given catabolic state of chronic liver disease  -Discussed with the patient that we will give her a follow-up appointment in hepatology clinic, however, would give patient the following information for a follow up with a gastroenterologist in [place] (closer to home)      - Dr. Bobby Pittman      - Address: [address] [place] [address], [place] [address]      - Phone: [phone]    Please contact our service if further questions.    Thomas M. Leventhal, M.D.   of Medicine  Advanced & Transplant Hepatology  [facility]

## 2020-01-20 NOTE — PROCEDURES
Procedure Date: 2020      EEG #-5       DATE OF RECORDING/SERVICE DATE:  2020       SOURCE FILE DURATION:  23 hours and 22 minutes.      CLINICAL HISTORY:  This patient is a 29-year-old right-handed woman with history of seizures who presented for further evaluation.  EEG was requested for further characterization of her seizure spells.      CURRENT MEDICATIONS:  Keppra, thiamine.      TECHNICAL SUMMARY: This continuous video- EEG monitoring procedure was performed with 23 scalp electrodes in 10-20 electrode system placements, and additional scalp, precordial and other surface electrodes used for electrical referencing and artifact detection.  Video monitoring was utilized and periodically reviewed by EEG technologists and the physician for electroclinical correlations.     BACKGROUND ACTIVITIES:  The background activities of this EEG were symmetric and consisted of 10 Hz posterior dominant rhythm, which attenuates with eye opening during maximal wakefulness with moderate amplitude.  Hyperventilation produced no change of the background activities.  Photic stimulation was not performed.      There were rare right temporal epileptiform activities observed during this recording.      ICTAL ACTIVITIES:  No seizures or spells were recorded.      SUMMARY OF DAY #5 OF VIDEO EEG MONITORING:  This EEG is mildly abnormal due to the presence of rare right temporal epileptiform activities.  No seizures or target spells were recorded.         LEI KLEIN MD             D: 2020   T: 2020   MT: STANLEY      Name:     ANTWON CLIFTON   MRN:      [id number]        Account:        [id number]   :      1990           Procedure Date: 2020      Document: [id number]

## 2020-01-20 NOTE — PLAN OF CARE
Patient vital signs stable, no new complaints, no seizure activity noted. Patient eager to return home. Understanding stated from patient with discharge medications and instructions. She packed her own belongings She left ambulatory accompanied by friendProblem: Adult Inpatient Plan of Care  Goal: Plan of Care Review  1/20/2020 1506 by Addy Bui RN  Outcome: Adequate for Discharge  1/20/2020 0504 by Narayan Giles RN  Outcome: No Change  Goal: Patient-Specific Goal (Individualization)  1/20/2020 1506 by Addy Bui RN  Outcome: Adequate for Discharge  1/20/2020 0504 by Narayan Giles RN  Outcome: No Change  Goal: Absence of Hospital-Acquired Illness or Injury  1/20/2020 1506 by Addy Bui RN  Outcome: Adequate for Discharge  1/20/2020 0504 by Narayan Giles RN  Outcome: No Change  Goal: Optimal Comfort and Wellbeing  1/20/2020 1506 by Addy Bui RN  Outcome: Adequate for Discharge  1/20/2020 0504 by Narayan Giles RN  Outcome: No Change  Goal: Readiness for Transition of Care  Outcome: Adequate for Discharge  Goal: Rounds/Family Conference  Outcome: Adequate for Discharge     Problem: Seizure, Active Management  Goal: Absence of Seizure/Seizure-Related Injury  1/20/2020 1506 by Addy Bui RN  Outcome: Adequate for Discharge  1/20/2020 0504 by Narayan Giles RN  Outcome: No Change     Problem: Cardiac Disease Comorbidity  Goal: Cardiac Disease  Description  Patient comorbidity will be monitored for signs and symptoms of Cardiac Disease.  Problems will be absent, minimized or managed by discharge/transition of care.  Outcome: Adequate for Discharge     Problem: Hematological Disorder  Goal: Hematological Disorder  Description  Patient comorbidity will be monitored for signs and symptoms of Hematogical condition.  Problems will be absent, minimized or managed by discharge/transition of care.  1/20/2020 1506 by Addy Bui RN  Outcome: Adequate for Discharge  1/20/2020  0504 by Narayan Giles, RN  Outcome: No Change

## 2020-01-21 ENCOUNTER — PATIENT OUTREACH (OUTPATIENT)
Dept: CARE COORDINATION | Facility: CLINIC | Age: 30
End: 2020-01-21

## 2020-01-22 LAB
BACTERIA SPEC CULT: NO GROWTH
SPECIMEN SOURCE: NORMAL

## 2020-01-23 ENCOUNTER — TRANSFERRED RECORDS (OUTPATIENT)
Dept: HEALTH INFORMATION MANAGEMENT | Facility: CLINIC | Age: 30
End: 2020-01-23

## 2020-01-23 ENCOUNTER — PATIENT OUTREACH (OUTPATIENT)
Dept: NEUROLOGY | Facility: CLINIC | Age: 30
End: 2020-01-23

## 2020-01-23 LAB
BACTERIA SPEC CULT: NO GROWTH
Lab: NORMAL
SPECIMEN SOURCE: NORMAL

## 2020-01-23 NOTE — PROGRESS NOTES
Since discharge, patient feels she is tired, but overall dooing quite well.  She has had no seizures, and continues on medications.    Patient had her blood drawn today as recommended, and will follow up with her primary provider    No questions at this time    Follow up reviewed:-    Future Appointments   Date Time Provider Department Center   2/5/2020  9:00 AM Sara Rahman PA-C [facility]   2/27/2020 11:30 AM Renee Vázquez [facility]     Patient instructed to call if questions or concerns arise

## 2020-01-29 NOTE — PROGRESS NOTES
Discharge plannin year old, right-handed female with a history of alcohol abuse, alcohol induced pancreatitis and hepatitis as well as anemia who is being admitted for characterization of seizures. Seizures started around age 25 years old. She reports 3 seizure types: Type 1 involves a weird smell, like menthol cough drop. This happens multiple times a week to once a month. Type 2 will mostly start with the above aura, but not always. She will have a blank stare and is unresponsive. These last for about 1 minute. She is amnestic afterwards. This happens about twice a month. Type 3 is full body shaking. She had only a few of these.     She had VEEG monitoring for 6 days. During these 6 days of video EEG monitoring, EEG was mildly abnormal due to the presence of rare to occasional right temporal epileptiform activities.  No seizures or target spells were recorded despite of the fact that her keppra was tapered off and she was sleep deprived..      Although no seizures were recorded, based on history and EEG findings, patient most like has epilepsy. On discharge, her PTA levetiracetam 500-1000 was restarted.     Consultations:    Medicine, GI/Hepatology was consulted.  At the time of admission, patients LFT's were elevated and she complained of abdominal pain. Medicine was consulted for elevated LFT and abdominal pain. Ultrasound suggeted hepatic steatosis with moderate amount of abdominal ascites. Abdominal pain was felt likely consistent with ETOH induced gastritis and Omeprazole BID started. She developed fevers and tachycardia on  with critically elevated procalcitonin. Blood cultures were obtained and she was started on IV Rocephin. Diagnostic paracentesis was done  as there was concern for spontaneous bacterial peritonitis. Paracentesis was negative. Blood cultures was also negative after 3 days. Her LFT's trended downward throughout admission. On , her hemoglobin was found to be 5.6. She  reported heavy vaginal bleeding with clots. Her INR was found to be elevated during admission as well. She received blood transfusion and FFP. On day of discharge hemoglobin was 7.1 and she was felt stable for discharge per medicine team.      She was given a new diagnosis of possible cirrhosis by Heptology given the presence of ascites and coagulopathy.      Discussed with the patient about alcohol cessation. Patient declined psychiatry and chemical dependence consults.    Patient will follow up with Dr. Ochoa in 4-6 weeks. She will follow up with GI in 4 weeks.    Patient was discharged with Keppra 500-1000, Omeprazole, Thiamine, folic acid.

## 2020-01-31 ENCOUNTER — TELEPHONE (OUTPATIENT)
Dept: GASTROENTEROLOGY | Facility: CLINIC | Age: 30
End: 2020-01-31

## 2020-01-31 NOTE — TELEPHONE ENCOUNTER
Left message for pt reminding them of upcoming appointment.  Instructed pt to bring updated medications list.  Catalina Cartagena on 1/31/2020 at 2:45 PM

## 2020-02-05 ENCOUNTER — OFFICE VISIT (OUTPATIENT)
Dept: GASTROENTEROLOGY | Facility: CLINIC | Age: 30
End: 2020-02-05
Attending: INTERNAL MEDICINE
Payer: COMMERCIAL

## 2020-02-05 ENCOUNTER — PRE VISIT (OUTPATIENT)
Dept: GASTROENTEROLOGY | Facility: CLINIC | Age: 30
End: 2020-02-05

## 2020-02-05 VITALS
WEIGHT: 160.9 LBS | SYSTOLIC BLOOD PRESSURE: 113 MMHG | HEART RATE: 98 BPM | DIASTOLIC BLOOD PRESSURE: 78 MMHG | RESPIRATION RATE: 16 BRPM | BODY MASS INDEX: 25.86 KG/M2 | OXYGEN SATURATION: 99 % | HEIGHT: 66 IN | TEMPERATURE: 98.6 F

## 2020-02-05 DIAGNOSIS — F10.988 ALCOHOL USE, UNSPECIFIED WITH OTHER ALCOHOL-INDUCED DISORDER (H): ICD-10-CM

## 2020-02-05 DIAGNOSIS — K70.11 ALCOHOLIC HEPATITIS WITH ASCITES (H): ICD-10-CM

## 2020-02-05 DIAGNOSIS — K70.11 ALCOHOLIC HEPATITIS WITH ASCITES (H): Primary | ICD-10-CM

## 2020-02-05 LAB
ALBUMIN SERPL-MCNC: 2.2 G/DL (ref 3.4–5)
ALP SERPL-CCNC: 173 U/L (ref 40–150)
ALT SERPL W P-5'-P-CCNC: 23 U/L (ref 0–50)
ANION GAP SERPL CALCULATED.3IONS-SCNC: 7 MMOL/L (ref 3–14)
AST SERPL W P-5'-P-CCNC: 77 U/L (ref 0–45)
BILIRUB DIRECT SERPL-MCNC: 0.6 MG/DL (ref 0–0.2)
BILIRUB SERPL-MCNC: 0.8 MG/DL (ref 0.2–1.3)
BUN SERPL-MCNC: 6 MG/DL (ref 7–30)
CALCIUM SERPL-MCNC: 8.1 MG/DL (ref 8.5–10.1)
CHLORIDE SERPL-SCNC: 108 MMOL/L (ref 94–109)
CO2 SERPL-SCNC: 22 MMOL/L (ref 20–32)
CREAT SERPL-MCNC: 0.48 MG/DL (ref 0.52–1.04)
ERYTHROCYTE [DISTWIDTH] IN BLOOD BY AUTOMATED COUNT: 21 % (ref 10–15)
GFR SERPL CREATININE-BSD FRML MDRD: >90 ML/MIN/{1.73_M2}
GLUCOSE SERPL-MCNC: 110 MG/DL (ref 70–99)
HCT VFR BLD AUTO: 30 % (ref 35–47)
HGB BLD-MCNC: 9.5 G/DL (ref 11.7–15.7)
INR PPP: 1.55 (ref 0.86–1.14)
MCH RBC QN AUTO: 32.3 PG (ref 26.5–33)
MCHC RBC AUTO-ENTMCNC: 31.7 G/DL (ref 31.5–36.5)
MCV RBC AUTO: 102 FL (ref 78–100)
PLATELET # BLD AUTO: 370 10E9/L (ref 150–450)
POTASSIUM SERPL-SCNC: 3.7 MMOL/L (ref 3.4–5.3)
PROT SERPL-MCNC: 6.9 G/DL (ref 6.8–8.8)
RBC # BLD AUTO: 2.94 10E12/L (ref 3.8–5.2)
SODIUM SERPL-SCNC: 137 MMOL/L (ref 133–144)
WBC # BLD AUTO: 5.4 10E9/L (ref 4–11)

## 2020-02-05 PROCEDURE — 80076 HEPATIC FUNCTION PANEL: CPT | Performed by: PHYSICIAN ASSISTANT

## 2020-02-05 PROCEDURE — 85027 COMPLETE CBC AUTOMATED: CPT | Performed by: PHYSICIAN ASSISTANT

## 2020-02-05 PROCEDURE — 36415 COLL VENOUS BLD VENIPUNCTURE: CPT | Performed by: PHYSICIAN ASSISTANT

## 2020-02-05 PROCEDURE — 80048 BASIC METABOLIC PNL TOTAL CA: CPT | Performed by: PHYSICIAN ASSISTANT

## 2020-02-05 PROCEDURE — 85610 PROTHROMBIN TIME: CPT | Performed by: PHYSICIAN ASSISTANT

## 2020-02-05 PROCEDURE — G0463 HOSPITAL OUTPT CLINIC VISIT: HCPCS | Mod: ZF

## 2020-02-05 RX ORDER — SPIRONOLACTONE 25 MG/1
25 TABLET ORAL DAILY
COMMUNITY
Start: 2020-01-27 | End: 2020-02-05

## 2020-02-05 RX ORDER — FUROSEMIDE 20 MG
20 TABLET ORAL DAILY
Qty: 30 TABLET | Refills: 2 | Status: SHIPPED | OUTPATIENT
Start: 2020-02-05 | End: 2020-04-24

## 2020-02-05 RX ORDER — SPIRONOLACTONE 25 MG/1
50 TABLET ORAL DAILY
Qty: 60 TABLET | Refills: 3 | Status: SHIPPED | OUTPATIENT
Start: 2020-02-05 | End: 2020-05-20

## 2020-02-05 ASSESSMENT — PAIN SCALES - GENERAL: PAINLEVEL: MODERATE PAIN (4)

## 2020-02-05 ASSESSMENT — MIFFLIN-ST. JEOR: SCORE: 1467.62

## 2020-02-05 NOTE — LETTER
2/5/2020      RE: Kota Cowan  [address]  [facility] [address]       Hepatology Clinic note  Kota Cowan   Date of Birth 1990  Date of Service 2/5/2020    REASON FOR CONSULTATION: Alcohol Hepatitis   REFERRING PROVIDER: Hospital Follow up          Assessment/plan:   Kota Cowan is a 29 year old female with recent hospitalization due to epilepsy and alcohol hepatitis complicated by abdominal ascites.  She does have some ascites on exam today, but her p.o. intake has been adequate and believe that her diuretics can be increased.  She has no evidence of hepatic encephalopathy or asterixis on exam.  Her transaminases are mildly elevated.  Her liver dysfunction is improving, MELD-Na 12.  We discussed that it would take at least 6 months of sobriety to determine her baseline liver disease and function.  We discussed the importance of good nutrition as she recovers.  We also discussed the importance of absolute sobriety from alcohol moving forward.  Strongly encouraged her to partake in some type of behavior therapy to help maintain sobriety as well as manage other mental health issues.  She declines any referral today.    # Continue absolute sobriety   # Variceal screening: EGD within the next month  # Ascites:   - Increase spironolactone to 50 mg  - Start furosemide 20 mg  - Repeat BMP in one week, labs to be done locally.    # US abdomen complete in six months  # Fibrosis scan in six months  # Rule out other hepatobiliary etiology: GAB, f-actin, alpha-1-anti-tripysin deficiency, iron panel  # Follow-up in clinic in six months or sooner as needed    Sara Rahman PA-C   [facility] Hepatology     -----------------------------------------------------       HPI:   Kota Cowan is a 29 year old female  presenting for the evaluation of elevated LFT's.     Patient following up after recent hospitalization for a evaluation of seizures.  She was also noted to have alcohol hepatitis  with abdominal ascites and anemia.  She had a paracentesis that was consistent with portal hypertension and no findings of SBP.  She had an abdominal ultrasound that showed moderate amount of ascites, hepatic steatosis.  No evaluation of the spleen.     Since discharge she states she has been tired.  Her abdomen still feels swollen and uncomfortable.  She states she is eating okay.  She was started on 25 mg of spironolactone last week.  States that has not seemed to help much.  She was previously told she had fatty liver during a hospitalization 3 years ago.      Patient denies jaundice, abdominal distension or confusion.  Patient also denies melena, hematochezia or hematemesis. Patient denies weight loss, fevers, sweats or chills.    PMH: History of eating disorder restricting and purging, which she currently states is not an issue,   History of alcohol abuse, epilepsy history of pancreatitis    SMH: wrist surgery     Medications: see below     No history of cigarette use.  She admits to history of significant alcohol use, with a combination of beers, hard alcohol or wine. She is very vague amount as well as the length of time she was drinking heavily.  She previously went to chemical dependency treatment, but states it was not helpful.  No history of intravenous or intranasal drug use.  She is not currently working.  She currently worked in retail.  She currently lives with her  and 10 and 7-year-old children.  No known family history of liver disease or liver cancer.    Previous work-up:  HCV antibody negative   HAV Ab IgG  HAV Ab IgM Nonreactive  HBV SAb   HBV SAg nonreactive  HBV CAb pending  GAB not done  F-actin not done  Iron panel:   Ferritin not done  Iron Sats: not done  TTG not done  Alpha-1-antripsin  Ceruloplasmin     TSH   Cholesterol Total   HDL  LDL  Triglycerides  Hemoglobin A1c    Medical hx Surgical hx   No past medical history on file. No past surgical history on file.               Medications:     Current Outpatient Medications   Medication     folic acid (FOLVITE) 1 MG tablet     furosemide (LASIX) 20 MG tablet     levETIRAcetam (KEPPRA) 500 MG tablet     LORazepam (ATIVAN) 1 MG tablet     multivitamin, therapeutic (THERA-VIT) TABS tablet     omeprazole (PRILOSEC) 20 MG DR capsule     spironolactone (ALDACTONE) 25 MG tablet     vitamin B1 (THIAMINE) 100 MG tablet     No current facility-administered medications for this visit.             Allergies:     Allergies   Allergen Reactions     Lamotrigine Other (See Comments)     confusion              Social History:     Social History     Socioeconomic History     Marital status: Single     Spouse name: Not on file     Number of children: Not on file     Years of education: Not on file     Highest education level: Not on file   Occupational History     Not on file   Social Needs     Financial resource strain: Not on file     Food insecurity:     Worry: Not on file     Inability: Not on file     Transportation needs:     Medical: Not on file     Non-medical: Not on file   Tobacco Use     Smoking status: Never Smoker     Smokeless tobacco: Never Used   Substance and Sexual Activity     Alcohol use: Not Currently     Alcohol/week: 5.0 standard drinks     Types: 5 Standard drinks or equivalent per week     Comment: last drink 1/14/2020     Drug use: Never     Sexual activity: Not on file   Lifestyle     Physical activity:     Days per week: Not on file     Minutes per session: Not on file     Stress: Not on file   Relationships     Social connections:     Talks on phone: Not on file     Gets together: Not on file     Attends Latter day service: Not on file     Active member of club or organization: Not on file     Attends meetings of clubs or organizations: Not on file     Relationship status: Not on file     Intimate partner violence:     Fear of current or ex partner: Not on file     Emotionally abused: Not on file     Physically abused: Not on  "file     Forced sexual activity: Not on file   Other Topics Concern     Not on file   Social History Narrative     Not on file            Family History:   No family history on file.           Review of Systems:   Gen: See HPI     HEENT: No change in vision or hearing, mouth sores, dysphagia, lymph nodes  Resp: No shortness of breath, coughing, hx of asthma  CV: No chest pain, palpitations, syncope   GI: See HPI  : No dysuria, history of stones, urine color    Skin: No rash; no pruritus or psoriasis  MS: No arthralgias, myalgias, joint swelling  Neuro: No memory changes, confusion, numbness    Heme: No difficulty clotting, bruising, bleeding  Psych:  No anxiety, depression, agitation          Physical Exam:   VS:  /78 (BP Location: Right arm, Patient Position: Sitting, Cuff Size: Adult Regular)   Pulse 98   Temp 98.6  F (37  C) (Oral)   Resp 16   Ht 1.67 m (5' 5.75\")   Wt 73 kg (160 lb 14.4 oz)   SpO2 99%   BMI 26.17 kg/m         Gen: A&Ox3, NAD, overweight   HEENT: non-icteric  CV: RRR, no overt murmurs  Lung: CTA Bilatererally, no wheezing or crackles.   Lym- no palpable lymphadenopathy  Abd: soft, NT, ND, no palpable splenomegaly, liver is not palpable. Some shifting dullness.   Ext: no edema, intact pulses.   Skin: No rash, no palmar erythema, telangiectasias or jaundice  Neuro: grossly intact, no asterixis   Psych: appropriate mood and affects         Data:   Reviewed in person and significant for:    Lab Results   Component Value Date     02/05/2020      Lab Results   Component Value Date    POTASSIUM 3.7 02/05/2020     Lab Results   Component Value Date    CHLORIDE 108 02/05/2020     Lab Results   Component Value Date    CO2 22 02/05/2020     Lab Results   Component Value Date    BUN 6 02/05/2020     Lab Results   Component Value Date    CR 0.48 02/05/2020       Lab Results   Component Value Date    WBC 5.4 02/05/2020     Lab Results   Component Value Date    HGB 9.5 02/05/2020     Lab " Results   Component Value Date    HCT 30.0 02/05/2020     Lab Results   Component Value Date     02/05/2020     Lab Results   Component Value Date     02/05/2020       Lab Results   Component Value Date    AST 77 02/05/2020     Lab Results   Component Value Date    ALT 23 02/05/2020     No results found for: BILICONJ   Lab Results   Component Value Date    BILITOTAL 0.8 02/05/2020       Lab Results   Component Value Date    ALBUMIN 2.2 02/05/2020     Lab Results   Component Value Date    PROTTOTAL 6.9 02/05/2020      Lab Results   Component Value Date    ALKPHOS 173 02/05/2020       Lab Results   Component Value Date    INR 1.55 02/05/2020         Imaging:    US abdomen Limited  1/16/2020:   IMPRESSION:   1.  Echogenic liver appearance suggesting hepatic steatosis. Moderate  amount of abdominal ascites. No acute gallbladder pathology  appreciated.  2.  The pancreas is not well-visualized.   3. Benign appearing anechoic cystic structure adjacent to the  pancreas.   May represent a pseudocyst.        Saar Rahman PA-C

## 2020-02-05 NOTE — NURSING NOTE
"Chief Complaint   Patient presents with     Hospital F/U     Alcohol hepatitis        Vital signs:  Temp: 98.6  F (37  C) Temp src: Oral BP: 113/78 Pulse: 98   Resp: 16 SpO2: 99 %     Height: 167 cm (5' 5.75\") Weight: 73 kg (160 lb 14.4 oz)  Estimated body mass index is 26.17 kg/m  as calculated from the following:    Height as of this encounter: 1.67 m (5' 5.75\").    Weight as of this encounter: 73 kg (160 lb 14.4 oz).          Kaylee Moyer, [facility]  2/5/2020 8:51 AM      "

## 2020-02-05 NOTE — PROGRESS NOTES
Hepatology Clinic note  Kota Cowan   Date of Birth 1990  Date of Service 2/5/2020    REASON FOR CONSULTATION: Alcohol Hepatitis   REFERRING PROVIDER: Hospital Follow up          Assessment/plan:   Kota Cowan is a 29 year old female with recent hospitalization due to epilepsy and alcohol hepatitis complicated by abdominal ascites.  She does have some ascites on exam today, but her p.o. intake has been adequate and believe that her diuretics can be increased.  She has no evidence of hepatic encephalopathy or asterixis on exam.  Her transaminases are mildly elevated.  Her liver dysfunction is improving, MELD-Na 12.  We discussed that it would take at least 6 months of sobriety to determine her baseline liver disease and function.  We discussed the importance of good nutrition as she recovers.  We also discussed the importance of absolute sobriety from alcohol moving forward.  Strongly encouraged her to partake in some type of behavior therapy to help maintain sobriety as well as manage other mental health issues.  She declines any referral today.    # Continue absolute sobriety   # Variceal screening: EGD within the next month  # Ascites:   - Increase spironolactone to 50 mg  - Start furosemide 20 mg  - Repeat BMP in one week, labs to be done locally.    # US abdomen complete in six months  # Fibrosis scan in six months  # Rule out other hepatobiliary etiology: GAB, f-actin, alpha-1-anti-tripysin deficiency, iron panel  # Follow-up in clinic in six months or sooner as needed    Sara Rahman PA-C   [facility] Hepatology     -----------------------------------------------------       HPI:   Kota Cowan is a 29 year old female  presenting for the evaluation of elevated LFT's.     Patient following up after recent hospitalization for a evaluation of seizures.  She was also noted to have alcohol hepatitis with abdominal ascites and anemia.  She had a paracentesis that was consistent  with portal hypertension and no findings of SBP.  She had an abdominal ultrasound that showed moderate amount of ascites, hepatic steatosis.  No evaluation of the spleen.     Since discharge she states she has been tired.  Her abdomen still feels swollen and uncomfortable.  She states she is eating okay.  She was started on 25 mg of spironolactone last week.  States that has not seemed to help much.  She was previously told she had fatty liver during a hospitalization 3 years ago.      Patient denies jaundice, abdominal distension or confusion.  Patient also denies melena, hematochezia or hematemesis. Patient denies weight loss, fevers, sweats or chills.    PMH: History of eating disorder restricting and purging, which she currently states is not an issue,   History of alcohol abuse, epilepsy history of pancreatitis    SMH: wrist surgery     Medications: see below     No history of cigarette use.  She admits to history of significant alcohol use, with a combination of beers, hard alcohol or wine. She is very vague amount as well as the length of time she was drinking heavily.  She previously went to chemical dependency treatment, but states it was not helpful.  No history of intravenous or intranasal drug use.  She is not currently working.  She currently worked in retail.  She currently lives with her  and 10 and 7-year-old children.  No known family history of liver disease or liver cancer.    Previous work-up:  HCV antibody negative   HAV Ab IgG  HAV Ab IgM Nonreactive  HBV SAb   HBV SAg nonreactive  HBV CAb pending  GAB not done  F-actin not done  Iron panel:   Ferritin not done  Iron Sats: not done  TTG not done  Alpha-1-antripsin  Ceruloplasmin     TSH   Cholesterol Total   HDL  LDL  Triglycerides  Hemoglobin A1c    Medical hx Surgical hx   No past medical history on file. No past surgical history on file.              Medications:     Current Outpatient Medications   Medication     folic acid (FOLVITE)  1 MG tablet     furosemide (LASIX) 20 MG tablet     levETIRAcetam (KEPPRA) 500 MG tablet     LORazepam (ATIVAN) 1 MG tablet     multivitamin, therapeutic (THERA-VIT) TABS tablet     omeprazole (PRILOSEC) 20 MG DR capsule     spironolactone (ALDACTONE) 25 MG tablet     vitamin B1 (THIAMINE) 100 MG tablet     No current facility-administered medications for this visit.             Allergies:     Allergies   Allergen Reactions     Lamotrigine Other (See Comments)     confusion              Social History:     Social History     Socioeconomic History     Marital status: Single     Spouse name: Not on file     Number of children: Not on file     Years of education: Not on file     Highest education level: Not on file   Occupational History     Not on file   Social Needs     Financial resource strain: Not on file     Food insecurity:     Worry: Not on file     Inability: Not on file     Transportation needs:     Medical: Not on file     Non-medical: Not on file   Tobacco Use     Smoking status: Never Smoker     Smokeless tobacco: Never Used   Substance and Sexual Activity     Alcohol use: Not Currently     Alcohol/week: 5.0 standard drinks     Types: 5 Standard drinks or equivalent per week     Comment: last drink 1/14/2020     Drug use: Never     Sexual activity: Not on file   Lifestyle     Physical activity:     Days per week: Not on file     Minutes per session: Not on file     Stress: Not on file   Relationships     Social connections:     Talks on phone: Not on file     Gets together: Not on file     Attends Yazidism service: Not on file     Active member of club or organization: Not on file     Attends meetings of clubs or organizations: Not on file     Relationship status: Not on file     Intimate partner violence:     Fear of current or ex partner: Not on file     Emotionally abused: Not on file     Physically abused: Not on file     Forced sexual activity: Not on file   Other Topics Concern     Not on file  "  Social History Narrative     Not on file            Family History:   No family history on file.           Review of Systems:   Gen: See HPI     HEENT: No change in vision or hearing, mouth sores, dysphagia, lymph nodes  Resp: No shortness of breath, coughing, hx of asthma  CV: No chest pain, palpitations, syncope   GI: See HPI  : No dysuria, history of stones, urine color    Skin: No rash; no pruritus or psoriasis  MS: No arthralgias, myalgias, joint swelling  Neuro: No memory changes, confusion, numbness    Heme: No difficulty clotting, bruising, bleeding  Psych:  No anxiety, depression, agitation          Physical Exam:   VS:  /78 (BP Location: Right arm, Patient Position: Sitting, Cuff Size: Adult Regular)   Pulse 98   Temp 98.6  F (37  C) (Oral)   Resp 16   Ht 1.67 m (5' 5.75\")   Wt 73 kg (160 lb 14.4 oz)   SpO2 99%   BMI 26.17 kg/m        Gen: A&Ox3, NAD, overweight   HEENT: non-icteric  CV: RRR, no overt murmurs  Lung: CTA Bilatererally, no wheezing or crackles.   Lym- no palpable lymphadenopathy  Abd: soft, NT, ND, no palpable splenomegaly, liver is not palpable. Some shifting dullness.   Ext: no edema, intact pulses.   Skin: No rash, no palmar erythema, telangiectasias or jaundice  Neuro: grossly intact, no asterixis   Psych: appropriate mood and affects         Data:   Reviewed in person and significant for:    Lab Results   Component Value Date     02/05/2020      Lab Results   Component Value Date    POTASSIUM 3.7 02/05/2020     Lab Results   Component Value Date    CHLORIDE 108 02/05/2020     Lab Results   Component Value Date    CO2 22 02/05/2020     Lab Results   Component Value Date    BUN 6 02/05/2020     Lab Results   Component Value Date    CR 0.48 02/05/2020       Lab Results   Component Value Date    WBC 5.4 02/05/2020     Lab Results   Component Value Date    HGB 9.5 02/05/2020     Lab Results   Component Value Date    HCT 30.0 02/05/2020     Lab Results   Component Value " Date     02/05/2020     Lab Results   Component Value Date     02/05/2020       Lab Results   Component Value Date    AST 77 02/05/2020     Lab Results   Component Value Date    ALT 23 02/05/2020     No results found for: BILICONJ   Lab Results   Component Value Date    BILITOTAL 0.8 02/05/2020       Lab Results   Component Value Date    ALBUMIN 2.2 02/05/2020     Lab Results   Component Value Date    PROTTOTAL 6.9 02/05/2020      Lab Results   Component Value Date    ALKPHOS 173 02/05/2020       Lab Results   Component Value Date    INR 1.55 02/05/2020         Imaging:    US abdomen Limited  1/16/2020:   IMPRESSION:   1.  Echogenic liver appearance suggesting hepatic steatosis. Moderate  amount of abdominal ascites. No acute gallbladder pathology  appreciated.  2.  The pancreas is not well-visualized.   3. Benign appearing anechoic cystic structure adjacent to the  pancreas.   May represent a pseudocyst.

## 2020-03-04 ENCOUNTER — TELEPHONE (OUTPATIENT)
Dept: GASTROENTEROLOGY | Facility: CLINIC | Age: 30
End: 2020-03-04

## 2020-03-04 NOTE — TELEPHONE ENCOUNTER
Patient Name: Kota Cowna   : 1990  MRN: [id number]       : N/A    fabrikt Active    VM with information needed to complete pre-assessment call.  Request pt contact Endoscopy Pre-assessment RN to complete upcoming procedure information.  Telephone call-back number provided.    Anabelle Sorto RN  [facility] Endoscopy    Additional Information regarding appointment:  _____________________________________________________      Patient scheduled for:  EGD    Indication for procedure. Alcoholic hepatitis with ascites    Sedation Type: C/S    Procedure Provider:  [place]      Referring Provider. Samson Rivas (PCP)    Arrival time verified: Wed / 3.11.2020    Facility location verified:   [facility] [place] [address]      History & Physical: N/A    Hx: Epilepsy  __________________________________________________      Prep Type:   [x] NPO /p 0300, No solid food /p 2200 the night before      Patient taking any blood thinners ? No      Electronic implanted medical devices ? No      GI / Hepatology History: Yes: Alcoholic hepatitis with ascites      Heart disease ? No      Lung disease ? No      Sleep apnea ? No      Diabetic ? No      Kidney disease ? No      Instructions given: [] Rec'd & Read   [] Reviewed     [] Resent via [employer] - This includes: EGD  instructions, Conscious Sedation policy, procedure date/time/location/provider.           Pre procedure teaching completed: [] Yes - Reviewed      IV Sedation: [] No questions regarding Sedation as ordered       : Transportation from procedure & responsible adult to be with patient following procedure for a minimum of 6 hrs (Conscious Sedation): [] ** - confirmed will have post-procedure companionship as required      Pt completed assessment via:  [] Return [employer] query responses   [] Return/Follow-up telephone call       _______________________________________________    Anabelle Sorto RN  [facility] Endoscopy

## 2020-03-10 ENCOUNTER — HEALTH MAINTENANCE LETTER (OUTPATIENT)
Age: 30
End: 2020-03-10

## 2020-03-11 ENCOUNTER — HOSPITAL ENCOUNTER (OUTPATIENT)
Facility: AMBULATORY SURGERY CENTER | Age: 30
End: 2020-03-11
Attending: INTERNAL MEDICINE
Payer: COMMERCIAL

## 2020-03-11 VITALS
DIASTOLIC BLOOD PRESSURE: 76 MMHG | TEMPERATURE: 97.9 F | SYSTOLIC BLOOD PRESSURE: 106 MMHG | OXYGEN SATURATION: 95 % | RESPIRATION RATE: 12 BRPM | BODY MASS INDEX: 20.57 KG/M2 | WEIGHT: 128 LBS | HEART RATE: 93 BPM | HEIGHT: 66 IN

## 2020-03-11 LAB — UPPER GI ENDOSCOPY: NORMAL

## 2020-03-11 RX ORDER — LIDOCAINE 40 MG/G
CREAM TOPICAL
Status: DISCONTINUED | OUTPATIENT
Start: 2020-03-11 | End: 2020-03-12 | Stop reason: HOSPADM

## 2020-03-11 RX ORDER — DIPHENHYDRAMINE HYDROCHLORIDE 50 MG/ML
INJECTION INTRAMUSCULAR; INTRAVENOUS PRN
Status: DISCONTINUED | OUTPATIENT
Start: 2020-03-11 | End: 2020-03-11 | Stop reason: HOSPADM

## 2020-03-11 RX ORDER — ONDANSETRON 2 MG/ML
4 INJECTION INTRAMUSCULAR; INTRAVENOUS
Status: DISCONTINUED | OUTPATIENT
Start: 2020-03-11 | End: 2020-03-12 | Stop reason: HOSPADM

## 2020-03-11 RX ORDER — SIMETHICONE
LIQUID (ML) MISCELLANEOUS PRN
Status: DISCONTINUED | OUTPATIENT
Start: 2020-03-11 | End: 2020-03-11 | Stop reason: HOSPADM

## 2020-03-11 RX ORDER — FENTANYL CITRATE 50 UG/ML
INJECTION, SOLUTION INTRAMUSCULAR; INTRAVENOUS PRN
Status: DISCONTINUED | OUTPATIENT
Start: 2020-03-11 | End: 2020-03-11 | Stop reason: HOSPADM

## 2020-03-11 ASSESSMENT — MIFFLIN-ST. JEOR: SCORE: 1322.35

## 2020-03-11 NOTE — DISCHARGE INSTRUCTIONS
Discharge Instructions after Upper Endoscopy (EGD)       Activity and Diet   You were given medicine for pain. You may be dizzy or sleepy.   For 24 hours:     Do not drive or use heavy equipment.     Do not make important decisions.     Do not drink any alcohol.   ___ You may return to your regular diet.       Discomfort   You may have a sore throat for 2 to 3 days. It may help to:     Avoid hot liquids for 24 hours.     Use sore throat lozenges.     Gargle as needed with salt water up to 4 times a day. Mix 1 cup of warm water with 1 teaspoon of salt. Do not swallow.   ___ Your esophagus was dilated (opened) or banded during the exam:     Drink only cool liquids for the rest of the day. Eat a soft diet for the next few days.     You may have a sore chest for 2 to 3 days.       You may take Tylenol (acetaminophen) for pain unless your doctor has told you not to.       Do not take aspirin or ibuprofen (Advil, Motrin) or other NSAIDS (anti-inflammatory drugs) for ___ days.       Follow-up   ___ We took small tissue samples for study. If you do not have a follow-up visit scheduled, call your provider s office in 2 weeks for the results.       Other instructions________________________________________________________       When to call us:   Problems are rare. Call right away if you have:     Unusual throat pain or trouble swallowing     Unusual pain in belly or chest that is not relieved by belching or passing air     Black stools (tar-like looking bowel movement)     Temperature above 100.6  F. (37.5  C).       If you vomit blood or have severe pain, go to an emergency room.       If you have questions, call:   Monday to Friday, 7 a.m. to 4:30 p.m.: Endoscopy: [phone] (We may have to call you back)       After hours: Hospital: [phone] (Ask for the GI fellow on call)

## 2020-04-24 DIAGNOSIS — K70.11 ALCOHOLIC HEPATITIS WITH ASCITES (H): ICD-10-CM

## 2020-04-24 RX ORDER — FUROSEMIDE 20 MG
20 TABLET ORAL DAILY
Qty: 30 TABLET | Refills: 6 | Status: SHIPPED | OUTPATIENT
Start: 2020-04-24 | End: 2020-10-20

## 2020-05-20 DIAGNOSIS — K70.11 ALCOHOLIC HEPATITIS WITH ASCITES (H): ICD-10-CM

## 2020-05-20 RX ORDER — SPIRONOLACTONE 25 MG/1
50 TABLET ORAL DAILY
Qty: 60 TABLET | Refills: 3 | Status: ON HOLD | OUTPATIENT
Start: 2020-05-20 | End: 2020-10-10

## 2020-06-19 DIAGNOSIS — F10.988 ALCOHOL USE, UNSPECIFIED WITH OTHER ALCOHOL-INDUCED DISORDER (H): ICD-10-CM

## 2020-07-28 DIAGNOSIS — G40.109 FOCAL EPILEPSY (H): ICD-10-CM

## 2020-07-28 NOTE — TELEPHONE ENCOUNTER
Pending Prescriptions:                       Disp   Refills    levETIRAcetam (KEPPRA) 500 MG tablet [Pha*270 ta*1            Sig: TAKE 1 TABLET BY MOUTH EVERY MORNING AND 2           TABLETS BY MOUTH EVERY EVENING        Last Written Prescription Date:  1/20/2020  Last Fill Quantity: 270,   # refills: 1  Last Office Visit : 6/19/2019  Future Office visit:  None, canceled follow up    Call placed to the patient to inquire if she plans to continue care at our office. Voicemail left requesting a call back.

## 2020-07-30 ENCOUNTER — TELEPHONE (OUTPATIENT)
Dept: GASTROENTEROLOGY | Facility: CLINIC | Age: 30
End: 2020-07-30

## 2020-07-31 ENCOUNTER — MYC MEDICAL ADVICE (OUTPATIENT)
Dept: NEUROLOGY | Facility: CLINIC | Age: 30
End: 2020-07-31

## 2020-07-31 NOTE — TELEPHONE ENCOUNTER
[employer] message sent to patient requesting update on whether she is following with [facility] providers or other providers

## 2020-07-31 NOTE — LETTER
8/4/2020       RE: Kota Cowan  [address]  [place] [address]            Nisha Escudero,       We received a refill request for levetiracetam.     You last saw a [facility] provider upon discharge from the inpatient VEEG evaluation, and cancelled your follow up with .     For us to manage your medications we will need to have an appointment with you.     Please let us know if you have transferred care to a different provider, and please ask your pharmacy to reach out to that provider for refills on the levetiracetam.     If you wish to continue to have a [facility] provider manage your levetiracetam, please call [phone] to make an appointment.       Thank you,           Rajat FLANAGAN, RN  Nurse Care Coordinator  [facility] Epilepsy/Memory/Neurospecialty Care

## 2020-08-04 DIAGNOSIS — K70.11 ALCOHOLIC HEPATITIS WITH ASCITES (H): Primary | ICD-10-CM

## 2020-08-04 NOTE — TELEPHONE ENCOUNTER
Notification of unread MyChart message.    Will mail letter to the patient with the same information

## 2020-09-08 RX ORDER — LEVETIRACETAM 500 MG/1
TABLET ORAL
Qty: 270 TABLET | Refills: 1 | Status: SHIPPED | OUTPATIENT
Start: 2020-09-08

## 2020-10-10 ENCOUNTER — HOSPITAL ENCOUNTER (OUTPATIENT)
Facility: CLINIC | Age: 30
Setting detail: OBSERVATION
Discharge: HOME | End: 2020-10-11
Attending: HOSPITALIST | Admitting: HOSPITALIST
Payer: COMMERCIAL

## 2020-10-10 DIAGNOSIS — K70.31 ALCOHOLIC CIRRHOSIS OF LIVER WITH ASCITES (H): Primary | ICD-10-CM

## 2020-10-10 DIAGNOSIS — F10.988 ALCOHOL USE, UNSPECIFIED WITH OTHER ALCOHOL-INDUCED DISORDER (H): ICD-10-CM

## 2020-10-10 PROBLEM — R41.0 CONFUSION: Status: ACTIVE | Noted: 2020-10-10

## 2020-10-10 LAB
ALBUMIN SERPL-MCNC: 2.1 G/DL (ref 3.4–5)
ALP SERPL-CCNC: 160 U/L (ref 40–150)
ALT SERPL W P-5'-P-CCNC: 36 U/L (ref 0–50)
ANION GAP SERPL CALCULATED.3IONS-SCNC: 9 MMOL/L (ref 3–14)
AST SERPL W P-5'-P-CCNC: 149 U/L (ref 0–45)
BASOPHILS # BLD AUTO: 0.1 10E9/L (ref 0–0.2)
BASOPHILS NFR BLD AUTO: 1.7 %
BILIRUB SERPL-MCNC: 1.1 MG/DL (ref 0.2–1.3)
BUN SERPL-MCNC: 7 MG/DL (ref 7–30)
CALCIUM SERPL-MCNC: 7.5 MG/DL (ref 8.5–10.1)
CHLORIDE SERPL-SCNC: 104 MMOL/L (ref 94–109)
CO2 SERPL-SCNC: 19 MMOL/L (ref 20–32)
CREAT SERPL-MCNC: 0.58 MG/DL (ref 0.52–1.04)
DIFFERENTIAL METHOD BLD: ABNORMAL
EOSINOPHIL # BLD AUTO: 0.1 10E9/L (ref 0–0.7)
EOSINOPHIL NFR BLD AUTO: 1.2 %
ERYTHROCYTE [DISTWIDTH] IN BLOOD BY AUTOMATED COUNT: 12.9 % (ref 10–15)
GFR SERPL CREATININE-BSD FRML MDRD: >90 ML/MIN/{1.73_M2}
GLUCOSE SERPL-MCNC: 130 MG/DL (ref 70–99)
HCT VFR BLD AUTO: 25.9 % (ref 35–47)
HGB BLD-MCNC: 9.3 G/DL (ref 11.7–15.7)
IMM GRANULOCYTES # BLD: 0 10E9/L (ref 0–0.4)
IMM GRANULOCYTES NFR BLD: 0.2 %
LYMPHOCYTES # BLD AUTO: 1.5 10E9/L (ref 0.8–5.3)
LYMPHOCYTES NFR BLD AUTO: 35.3 %
MAGNESIUM SERPL-MCNC: 1.9 MG/DL (ref 1.6–2.3)
MCH RBC QN AUTO: 36.8 PG (ref 26.5–33)
MCHC RBC AUTO-ENTMCNC: 35.9 G/DL (ref 31.5–36.5)
MCV RBC AUTO: 102 FL (ref 78–100)
MONOCYTES # BLD AUTO: 0.4 10E9/L (ref 0–1.3)
MONOCYTES NFR BLD AUTO: 9.8 %
NEUTROPHILS # BLD AUTO: 2.2 10E9/L (ref 1.6–8.3)
NEUTROPHILS NFR BLD AUTO: 51.8 %
NRBC # BLD AUTO: 0 10*3/UL
NRBC BLD AUTO-RTO: 0 /100
PLATELET # BLD AUTO: 152 10E9/L (ref 150–450)
POTASSIUM SERPL-SCNC: 2 MMOL/L (ref 3.4–5.3)
POTASSIUM SERPL-SCNC: 2.2 MMOL/L (ref 3.4–5.3)
PROT SERPL-MCNC: 6.2 G/DL (ref 6.8–8.8)
RBC # BLD AUTO: 2.53 10E12/L (ref 3.8–5.2)
SARS-COV-2 RNA SPEC QL NAA+PROBE: NOT DETECTED
SODIUM SERPL-SCNC: 132 MMOL/L (ref 133–144)
SPECIMEN SOURCE: NORMAL
WBC # BLD AUTO: 4.2 10E9/L (ref 4–11)

## 2020-10-10 PROCEDURE — 96375 TX/PRO/DX INJ NEW DRUG ADDON: CPT

## 2020-10-10 PROCEDURE — 36415 COLL VENOUS BLD VENIPUNCTURE: CPT | Performed by: HOSPITALIST

## 2020-10-10 PROCEDURE — 250N000013 HC RX MED GY IP 250 OP 250 PS 637: Performed by: INTERNAL MEDICINE

## 2020-10-10 PROCEDURE — 250N000011 HC RX IP 250 OP 636: Performed by: HOSPITALIST

## 2020-10-10 PROCEDURE — 99203 OFFICE O/P NEW LOW 30 MIN: CPT | Performed by: PSYCHIATRY & NEUROLOGY

## 2020-10-10 PROCEDURE — G0378 HOSPITAL OBSERVATION PER HR: HCPCS

## 2020-10-10 PROCEDURE — 85025 COMPLETE CBC W/AUTO DIFF WBC: CPT | Performed by: HOSPITALIST

## 2020-10-10 PROCEDURE — 80053 COMPREHEN METABOLIC PANEL: CPT | Performed by: HOSPITALIST

## 2020-10-10 PROCEDURE — U0003 INFECTIOUS AGENT DETECTION BY NUCLEIC ACID (DNA OR RNA); SEVERE ACUTE RESPIRATORY SYNDROME CORONAVIRUS 2 (SARS-COV-2) (CORONAVIRUS DISEASE [COVID-19]), AMPLIFIED PROBE TECHNIQUE, MAKING USE OF HIGH THROUGHPUT TECHNOLOGIES AS DESCRIBED BY CMS-2020-01-R: HCPCS | Performed by: HOSPITALIST

## 2020-10-10 PROCEDURE — 84132 ASSAY OF SERUM POTASSIUM: CPT | Performed by: INTERNAL MEDICINE

## 2020-10-10 PROCEDURE — 99220 PR INITIAL OBSERVATION CARE,LEVEL III: CPT | Performed by: HOSPITALIST

## 2020-10-10 PROCEDURE — 250N000013 HC RX MED GY IP 250 OP 250 PS 637: Performed by: HOSPITALIST

## 2020-10-10 PROCEDURE — 96365 THER/PROPH/DIAG IV INF INIT: CPT

## 2020-10-10 PROCEDURE — 83735 ASSAY OF MAGNESIUM: CPT | Performed by: HOSPITALIST

## 2020-10-10 PROCEDURE — 96366 THER/PROPH/DIAG IV INF ADDON: CPT

## 2020-10-10 PROCEDURE — 36415 COLL VENOUS BLD VENIPUNCTURE: CPT | Performed by: INTERNAL MEDICINE

## 2020-10-10 PROCEDURE — 99207 PR NO CHARGE LOS: CPT | Performed by: INTERNAL MEDICINE

## 2020-10-10 RX ORDER — POTASSIUM CL/LIDO/0.9 % NACL 10MEQ/0.1L
10 INTRAVENOUS SOLUTION, PIGGYBACK (ML) INTRAVENOUS
Status: DISCONTINUED | OUTPATIENT
Start: 2020-10-10 | End: 2020-10-11 | Stop reason: HOSPADM

## 2020-10-10 RX ORDER — MIDODRINE HYDROCHLORIDE 10 MG/1
10 TABLET ORAL 3 TIMES DAILY
COMMUNITY

## 2020-10-10 RX ORDER — SPIRONOLACTONE 25 MG/1
75 TABLET ORAL DAILY
Status: ON HOLD | COMMUNITY
End: 2020-10-11

## 2020-10-10 RX ORDER — FOLIC ACID 1 MG/1
1 TABLET ORAL ONCE
Status: COMPLETED | OUTPATIENT
Start: 2020-10-10 | End: 2020-10-10

## 2020-10-10 RX ORDER — LANOLIN ALCOHOL/MO/W.PET/CERES
3 CREAM (GRAM) TOPICAL
Status: DISCONTINUED | OUTPATIENT
Start: 2020-10-10 | End: 2020-10-11 | Stop reason: HOSPADM

## 2020-10-10 RX ORDER — ONDANSETRON 4 MG/1
4 TABLET, ORALLY DISINTEGRATING ORAL EVERY 6 HOURS PRN
Status: DISCONTINUED | OUTPATIENT
Start: 2020-10-10 | End: 2020-10-11 | Stop reason: HOSPADM

## 2020-10-10 RX ORDER — LACTULOSE 10 G/15ML
20 SOLUTION ORAL 2 TIMES DAILY
Status: DISCONTINUED | OUTPATIENT
Start: 2020-10-10 | End: 2020-10-11 | Stop reason: HOSPADM

## 2020-10-10 RX ORDER — POTASSIUM CHLORIDE 7.45 MG/ML
10 INJECTION INTRAVENOUS
Status: DISCONTINUED | OUTPATIENT
Start: 2020-10-10 | End: 2020-10-11 | Stop reason: HOSPADM

## 2020-10-10 RX ORDER — POTASSIUM CHLORIDE 1.5 G/1.58G
20-40 POWDER, FOR SOLUTION ORAL
Status: DISCONTINUED | OUTPATIENT
Start: 2020-10-10 | End: 2020-10-11 | Stop reason: HOSPADM

## 2020-10-10 RX ORDER — MULTIVITAMIN,THERAPEUTIC
1 TABLET ORAL ONCE
Status: COMPLETED | OUTPATIENT
Start: 2020-10-10 | End: 2020-10-10

## 2020-10-10 RX ORDER — MAGNESIUM OXIDE 400 MG/1
800 TABLET ORAL ONCE
Status: COMPLETED | OUTPATIENT
Start: 2020-10-10 | End: 2020-10-10

## 2020-10-10 RX ORDER — MAGNESIUM SULFATE HEPTAHYDRATE 40 MG/ML
2 INJECTION, SOLUTION INTRAVENOUS DAILY PRN
Status: DISCONTINUED | OUTPATIENT
Start: 2020-10-10 | End: 2020-10-11 | Stop reason: HOSPADM

## 2020-10-10 RX ORDER — SPIRONOLACTONE 25 MG/1
75 TABLET ORAL DAILY
Status: DISCONTINUED | OUTPATIENT
Start: 2020-10-11 | End: 2020-10-11 | Stop reason: HOSPADM

## 2020-10-10 RX ORDER — AMOXICILLIN 250 MG
2 CAPSULE ORAL 2 TIMES DAILY PRN
Status: DISCONTINUED | OUTPATIENT
Start: 2020-10-10 | End: 2020-10-11 | Stop reason: HOSPADM

## 2020-10-10 RX ORDER — POTASSIUM CHLORIDE 29.8 MG/ML
20 INJECTION INTRAVENOUS
Status: DISCONTINUED | OUTPATIENT
Start: 2020-10-10 | End: 2020-10-11 | Stop reason: HOSPADM

## 2020-10-10 RX ORDER — MAGNESIUM SULFATE HEPTAHYDRATE 40 MG/ML
4 INJECTION, SOLUTION INTRAVENOUS EVERY 4 HOURS PRN
Status: DISCONTINUED | OUTPATIENT
Start: 2020-10-10 | End: 2020-10-11 | Stop reason: HOSPADM

## 2020-10-10 RX ORDER — LEVETIRACETAM 500 MG/1
500 TABLET ORAL DAILY
Status: DISCONTINUED | OUTPATIENT
Start: 2020-10-10 | End: 2020-10-11 | Stop reason: HOSPADM

## 2020-10-10 RX ORDER — MULTIVITAMIN,THERAPEUTIC
1 TABLET ORAL DAILY
Status: DISCONTINUED | OUTPATIENT
Start: 2020-10-10 | End: 2020-10-11 | Stop reason: HOSPADM

## 2020-10-10 RX ORDER — NALOXONE HYDROCHLORIDE 0.4 MG/ML
.1-.4 INJECTION, SOLUTION INTRAMUSCULAR; INTRAVENOUS; SUBCUTANEOUS
Status: DISCONTINUED | OUTPATIENT
Start: 2020-10-10 | End: 2020-10-11 | Stop reason: HOSPADM

## 2020-10-10 RX ORDER — POTASSIUM CHLORIDE 1500 MG/1
20 TABLET, EXTENDED RELEASE ORAL
COMMUNITY

## 2020-10-10 RX ORDER — LEVETIRACETAM 500 MG/1
1000 TABLET ORAL ONCE
Status: COMPLETED | OUTPATIENT
Start: 2020-10-10 | End: 2020-10-10

## 2020-10-10 RX ORDER — LANOLIN ALCOHOL/MO/W.PET/CERES
100 CREAM (GRAM) TOPICAL ONCE
Status: COMPLETED | OUTPATIENT
Start: 2020-10-10 | End: 2020-10-10

## 2020-10-10 RX ORDER — AMOXICILLIN 250 MG
1 CAPSULE ORAL 2 TIMES DAILY PRN
Status: DISCONTINUED | OUTPATIENT
Start: 2020-10-10 | End: 2020-10-11 | Stop reason: HOSPADM

## 2020-10-10 RX ORDER — LEVETIRACETAM 500 MG/1
1000 TABLET ORAL AT BEDTIME
Status: DISCONTINUED | OUTPATIENT
Start: 2020-10-10 | End: 2020-10-11 | Stop reason: HOSPADM

## 2020-10-10 RX ORDER — ONDANSETRON 2 MG/ML
4 INJECTION INTRAMUSCULAR; INTRAVENOUS EVERY 6 HOURS PRN
Status: DISCONTINUED | OUTPATIENT
Start: 2020-10-10 | End: 2020-10-11 | Stop reason: HOSPADM

## 2020-10-10 RX ORDER — POLYETHYLENE GLYCOL 3350 17 G/17G
17 POWDER, FOR SOLUTION ORAL DAILY PRN
Status: DISCONTINUED | OUTPATIENT
Start: 2020-10-10 | End: 2020-10-11 | Stop reason: HOSPADM

## 2020-10-10 RX ORDER — POTASSIUM CHLORIDE 1500 MG/1
20-40 TABLET, EXTENDED RELEASE ORAL
Status: DISCONTINUED | OUTPATIENT
Start: 2020-10-10 | End: 2020-10-11 | Stop reason: HOSPADM

## 2020-10-10 RX ORDER — BISACODYL 10 MG
10 SUPPOSITORY, RECTAL RECTAL DAILY PRN
Status: DISCONTINUED | OUTPATIENT
Start: 2020-10-10 | End: 2020-10-11 | Stop reason: HOSPADM

## 2020-10-10 RX ORDER — LIDOCAINE 40 MG/G
CREAM TOPICAL
Status: DISCONTINUED | OUTPATIENT
Start: 2020-10-10 | End: 2020-10-11 | Stop reason: HOSPADM

## 2020-10-10 RX ORDER — MIDODRINE HYDROCHLORIDE 5 MG/1
10 TABLET ORAL 3 TIMES DAILY
Status: DISCONTINUED | OUTPATIENT
Start: 2020-10-10 | End: 2020-10-11 | Stop reason: HOSPADM

## 2020-10-10 RX ADMIN — Medication 10 MEQ: at 17:24

## 2020-10-10 RX ADMIN — Medication 10 MEQ: at 18:40

## 2020-10-10 RX ADMIN — THIAMINE HCL TAB 100 MG 100 MG: 100 TAB at 03:09

## 2020-10-10 RX ADMIN — THERA TABS 1 TABLET: TAB at 11:32

## 2020-10-10 RX ADMIN — MAGNESIUM OXIDE 800 MG: 400 TABLET ORAL at 03:08

## 2020-10-10 RX ADMIN — LACTULOSE 20 G: 10 SOLUTION ORAL at 11:08

## 2020-10-10 RX ADMIN — LEVETIRACETAM 1000 MG: 500 TABLET ORAL at 03:08

## 2020-10-10 RX ADMIN — MAGNESIUM SULFATE IN WATER 2 G: 40 INJECTION, SOLUTION INTRAVENOUS at 20:54

## 2020-10-10 RX ADMIN — MIDODRINE HYDROCHLORIDE 10 MG: 5 TABLET ORAL at 21:40

## 2020-10-10 RX ADMIN — FOLIC ACID 1 MG: 1 TABLET ORAL at 03:09

## 2020-10-10 RX ADMIN — Medication 10 MEQ: at 19:47

## 2020-10-10 RX ADMIN — THERA TABS 1 TABLET: TAB at 03:09

## 2020-10-10 RX ADMIN — LEVETIRACETAM 1000 MG: 500 TABLET ORAL at 21:40

## 2020-10-10 RX ADMIN — OMEPRAZOLE 20 MG: 20 CAPSULE, DELAYED RELEASE ORAL at 16:14

## 2020-10-10 RX ADMIN — Medication 10 MEQ: at 14:27

## 2020-10-10 RX ADMIN — Medication 10 MEQ: at 16:19

## 2020-10-10 RX ADMIN — LACTULOSE 20 G: 10 SOLUTION ORAL at 21:40

## 2020-10-10 RX ADMIN — LEVETIRACETAM 500 MG: 500 TABLET ORAL at 11:32

## 2020-10-10 RX ADMIN — MIDODRINE HYDROCHLORIDE 10 MG: 5 TABLET ORAL at 16:14

## 2020-10-10 RX ADMIN — Medication 10 MEQ: at 13:23

## 2020-10-10 NOTE — CONSULTS
Psychiatry consult: initial  Patient seen; full note to follow    Alcohol use disorder, mild  Delirium, likely due to another medical condition (electrolyte abnormalities and/or alcohol intoxication)    Recommendations:  No acute psychiatric concerns noted or verbalized today.   She plans on abstaining for alcohol however is not interested in pursuing formal treatment.   She is currently at low acute risk for suicide.   Once she is determined to be medically stable, she may be discharged home.     Davi Hunter MD   Text Page

## 2020-10-10 NOTE — PROGRESS NOTES
"Brief Progress Note    Kota Cowan is a 31yo F with PMH of decompensated alcoholic cirrhosis, seizure disorder who was admitted earlier today for hypokalemia and hepatic encephalopathy. She does not remember anything leading up to this hospitalization, but reports she is feeling better now. She is A&Ox4 but appears sluggish when talking. She can spell \"world\" backwards but can't do serial 7's subtracting from 100. She has mild hepatic encephalopathy. She does not remember being on lactulose therapy before but I see it was ordered on discharge after an admission in 7/2020. She reports poor PO intake in general.    BP 99/58   Pulse 87   Temp 98  F (36.7  C)   Resp 18   Wt 49.9 kg (110 lb)   SpO2 100%   BMI 17.75 kg/m    Female in NAD  RRR no m/r/g  CTAB  Abd nontender, nondistended  Noted spider angiomata throughout  No LE edema    A/P  1. Sever hypokalemia--restarted spironoalctone, replace with potassium protocol  2. Mild to moderate hepatic encephalopathy: Ammonia 129 this admission. She is reportedly drinking alcohol prior to admission, ethanol level 0.202 on admission. Started lactulose today. Monitor for improvement  - Psych and CD consult pending  - [facility]  3. Alcoholic hepatitis: Appears mostly stable here, monitor daily  4. Left knee ecchymosis  5. Restarted PTA Keppra, midodrine, PPI, spironolactone  6. Appreciate GI consultation    Quinton Reardon MD  Hospitalist  "

## 2020-10-10 NOTE — CONSULTS
[facility] - Gastroenterology Consultation    Consultation Assessment/Plan:    1.) Alcohol Liver Disease:  - known alcoholic liver disease, albeit mild and well compensated based on labs are recent history  - recent confusion likely related to hypokalemia (due to running our of spironolactone) and on going alcohol use, with resolution of confusion after replacement of potassium  - at this point, I would suggest resuming outside prescriptions & outpatient follow-up with [facility]  - no further inpatient recommendations, call with further questions will inpatient      Hugh Rangel MD    Office: [phone]    ---------------------------------------------------------------------------------------------------------------------------  Patient Name: Kota Cowan      YOB: 1990 (Age: 30 year old)   Medical Record #: [id number]       Primary Physician: Samson Abraham   Referring Provider: Divya Kahn MD   Admit Date/Time: 10/10/2020  1:15 AM       I was asked to see this patient by Divya Kahn MD for evaluation of confusion, alcohol liver disease.    History of Present Illness:  29 y/o woman with history of alcohol liver disease, hypokalemia, and seizure D/O who presented to outside hospital with confusion and hypokalemia (K=1.8).  She had been drinking alcohol recently and BAL on admission elevated (BAL=0.202).  She follows with [facility] and is treated with furosemide & spironolactone.  She recently ran out of spironolactone and therefore was on furosemide alone.  She denies fevers/chills, nausea/vomiting, abdominal pain, constipation/diarrhea, melena/hematochezia, or hematemesis.  No other new medications.    WBC=6.4  Zlo=628  Cr=0.66  INR=1.2  TBili=1.3  ALT=40  LWG=467  Alb=2.9  Bood EtOH=0.202    EGD (3/20):  - indication: cirrhosis, screen for varices  - Z-line regular, 35 cm from the incisors.  Normal esophagus.   - Portal hypertensive gastropathy.    - Normal examined duodenum.     Abd US (7/20):  1. Multiple cystic pancreatic lesions which may represent pseudocysts. The differential also includes cystic pancreatic tumors. Contrast enhanced CT may allow more full characterization.  2. No cholelithiasis.  3. No biliary ductal dilatation.  4. Fatty infiltration of the liver.  5. Trace abdominal ascites.      Past Medical History:  Past Medical History:   Diagnosis Date     Alcoholic cirrhosis of liver without ascites (H)      Alcoholic pancreatitis      Eating disorder      Hypokalemia      Hypomagnesemia      Portal hypertension (H)      Seizures (H)      Past Surgical History:   Procedure Laterality Date     ESOPHAGOSCOPY, GASTROSCOPY, DUODENOSCOPY (EGD), COMBINED N/A 3/11/2020    Procedure: ESOPHAGOGASTRODUODENOSCOPY (EGD);  Surgeon: Leventhal, Thomas Michael, MD;  Location:  OR     ORTHOPEDIC SURGERY Right        Review of Systems: A comprehensive review of systems was negative except for items noted in HPI/Subjective.    Current Medications:  No current outpatient medications on file.       Allergies/Sensitivities:   Allergies   Allergen Reactions     Lamotrigine Other (See Comments)     confusion            Social History:   Social History     Socioeconomic History     Marital status: Single     Spouse name: Not on file     Number of children: Not on file     Years of education: Not on file     Highest education level: Not on file   Occupational History     Not on file   Social Needs     Financial resource strain: Not on file     Food insecurity     Worry: Not on file     Inability: Not on file     Transportation needs     Medical: Not on file     Non-medical: Not on file   Tobacco Use     Smoking status: Never Smoker     Smokeless tobacco: Never Used   Substance and Sexual Activity     Alcohol use: Not Currently     Alcohol/week: 5.0 standard drinks     Types: 5 Standard drinks or equivalent per week     Comment: last drink 1/14/2020     Drug use: Never      Sexual activity: Not on file   Lifestyle     Physical activity     Days per week: Not on file     Minutes per session: Not on file     Stress: Not on file   Relationships     Social connections     Talks on phone: Not on file     Gets together: Not on file     Attends Hoahaoism service: Not on file     Active member of club or organization: Not on file     Attends meetings of clubs or organizations: Not on file     Relationship status: Not on file     Intimate partner violence     Fear of current or ex partner: Not on file     Emotionally abused: Not on file     Physically abused: Not on file     Forced sexual activity: Not on file   Other Topics Concern     Not on file   Social History Narrative     Not on file     Family History: No family history on file.    Physical Exam:  BP 99/58   Pulse 87   Temp 98  F (36.7  C)   Resp 18   Wt 49.9 kg (110 lb)   SpO2 100%   BMI 17.75 kg/m     General Appearance: Comfortable, laying in bed  Eyes: Normal  HEENT: Normal  Neck: Normal, no palpable lymph nodes  Respiratory: Normal  Cardiovascular: Normal  Gastrointestinal: Normal bowel sounds  Musculoskeletal: Normal  Lymphatic: Normal  Skin: Normal  Neurologic: Normal     Labs/Imaging:  Recent Labs   Lab Test 02/05/20  0814 01/20/20  0255 01/19/20  0821 01/19/20  0557 01/18/20  2233 01/18/20  1516 01/18/20  1215 01/18/20  0342 01/18/20  0245 01/17/20  0456 01/16/20  0132 01/15/20  1502 01/15/20  1113   WBC 5.4 6.5  --  7.7  --   --   --  11.6* 11.6*  --   --   --  7.2   HGB 9.5* 7.1*  --  7.6* 7.4* 6.7*  --  5.4* 5.6*  --   --   --  9.8*   * 97  --  94  --   --   --  112* 117*  --   --   --  113*    149*  --  130*  --   --   --  189 183  --   --   --  150   INR 1.55* 1.38* 1.41*  --   --   --  1.47*  --   --  2.03* 1.85* 1.63*  --      No lab results found.    Invalid input(s): FERRITIN  Recent Labs   Lab Test 02/05/20  0814 01/20/20  0255 01/19/20  0821 01/18/20  2233 01/18/20  1215 01/16/20  0132  01/15/20  1113   POTASSIUM 3.7 3.6 4.2 3.8 2.9* 3.4 3.1*   CHLORIDE 108 106 106 101 98 102 101   BUN 6* 3* 3* 4* 5* 6* 4*     Recent Labs   Lab Test 02/05/20  0814 01/20/20  0255 01/19/20  0821 01/18/20  2233 01/18/20  1215 01/17/20  1715 01/17/20  0456 01/16/20  0132   PROTEIN  --   --   --   --   --  100*  --   --    ALBUMIN 2.2* 1.9* 2.0* 2.0* 2.1*  --  2.0* 2.1*   BILITOTAL 0.8 1.4* 2.1* 3.4* 3.9*  --  2.3* 2.2*   AST 77* 89* 83* 90* 83*  --  170* 220*   ALT 23 31 29 31 36  --  51* 61*

## 2020-10-10 NOTE — PROGRESS NOTES
Admission    Patient arrives to room 637-1 via cart from [facility].  Care plan note: yes    Inpatient nursing criteria listed below were met:    PCD's Documented: NA  Skin issues/needs documented :Yes  Isolation education started/completed NA  Patient allergies verified with patient: Yes  Verified completion of [place] Risk Assessment Tool:  Yes  Verified completion of Guardianship screening tool: Yes  Fall Prevention: Care plan updated, Education given and documented Yes  Care Plan initiated: Yes  Home medications documented in belongings flowsheet: NA  Patient belongings documented in belongings flowsheet: Yes  Reminder note (belongings/ medications) placed in discharge instructions:Yes  Admission profile/ required documentation complete: Yes  Bedside Report Letter given and explained to patient No  Visitor Designated? Pt still deciding  If patient is a 72 hour hold/Commitment are belongings removed from room and locked up? NA

## 2020-10-10 NOTE — H&P
[facility]    History and Physical  Hospitalist       Date of Admission:  10/10/2020  Date of Service (when I saw the patient): 10/10/20    ASSESSMENT  Kota Cowan is a pleasant 30 year old woman with cirrhosis due to alcohol use disorder as well as seizure disorder, who presents with confusion and is found to have severe hypokalemia.    PLAN    1) Confusion: Acute toxic encephalopathy due to alcohol intoxication suspected. Of note, she has seen GI in the past but says she has not done so since 2/2020 due to COVID. MELD score in 2012 was reportedly 12 and alcohol cessation strongly encouraged.  In 3/2020 she had EGD that showed mild portal hypertensive gastropathy without evidence of varices. She does not seem to have had prior episodes of hepatic encephalopathy clinically, though Ammonia levels have been high. She is prescribed diuretics to prevent ascites. In 7/2020 she was admitted to OSH for very low Potassium levels. Now she presents for reported confusion for the past couple of days, though her confusion now seems to have resolved. Her ETOH level was high at sending ED and alcohol intoxication seems most likely. There are no signs at present for withdrawal or of hepatic encephalopathy (Ammonia level is very high but has been similarly high in 7/2020 and she is not currently confused or distracted), nor of ascites at present. She does also have epileptic seizure disorder confirmed by positive EEG from 2019, but recurrent seizures seem less likely.    -- Observation. Regular diet ordered. q 4 neuro checks. GI and Psychiatry consulted for further evaluation.    -- Monitor for signs of withdrawal and start CIWA if they occur    -- If confusion recurs today, could consider trial of Lactulose    -- Resume diuretics and Prilosec when verified    2) Hypokalemia: Severe. Replacing; will keep Mag above 2 as well.    3) Ongoing alcoholic hepatitis: Trans-aminitis today is at similar levels  "compared to recent prior values. Monitor while hospitalized.    4) Hyponatremia: Mild. Possibly hypovolemic. Monitor for now, consider urine osmolality and Na levels if it drops further.    5) Chronic anemia: HGB 11 and was 7.7 on 7/13/2020. Monitor as needed while hospitalized.    6) Left knee ecchymosis: Ice pack ordered    7) Seizure disorder: Keppra 1000 mg at bedtime continued    Rule Out COVID-19 infection  This patient was evaluated during a global COVID-19 pandemic, which necessitated consideration that the patient might be at risk for infection with the SARS-CoV-2 virus that causes COVID-19. Applicable protocols for evaluation were followed during the patient's care. LOW suspicion for infection.   -follow-up COVID-19 PCR test result; no current indication for precautions      Chief Complaint   Confusion    History is obtained from the patient and the ED record    History of Present Illness   Kota Cowan is a pleasant 30 year old woman who presents with confusion. She presented to the [place], [place] ED where the following history was taken from her : \"Tuesday she was fine. Wednesday she had slight confusion. She would \"zone off.\" She couldn't follow a conversation. She ate and took her meds and 1 hour later she was back to baseline. Yesterday/THursday she was more confused than Wednesday. She ate and took her meds and went to sleep.  worked overnights. She woke up at 2100 and didn't know why he was leaving the house. Children are with with MGM.  got home at 1400 today. She was asleep on the couch. He went to sleep and woke up at 1430. He works at 0600 tomorrow am. When he woke up she was saying she had to go to the bathroom and walked to the kitchen.   \"    In the ED, T 97.7, pulse 95, /67, sats 100% on room air.     Ethanol level was 0.202. CBC showed WBC 6.4, HGB 11, . BMP showed Na 130, K 1.8, Cl 101, CO2 14, Glucose 134, Ca 8.0, BUN 9, Cr 0.66, Mag 1.7.  Hepatic " panel showed Alb 2.9, INR 1.2, T prot 7.2, Tbili 1.3, aphos 161, ALT 40, , and Ammonia 129. VBG showed 7.39/30. UA was negative. She was transferred here for further treatment after receiving Potassium IV and PO in the ED.    On my interview now here after transfer she is awake and alert and fully oriented times 3, and says she feels a lot better after getting the Potassium. She says she has been essentially locked indoors all summer due to COVID and drinking alcohol too much. Her last drink was yesterday.Her left knee has a large bruise. She denies abdominal pain or distension, or nausea, or diarrhea, melena, or hematochezia; or leg swelling, dyspnea, or cough, fever or chest pain; or any other acute complaints.    PHYSICAL EXAM  Blood pressure 107/68, pulse 99, temperature 98.2  F (36.8  C), temperature source Oral, resp. rate 16, SpO2 100 %.  Constitutional: Alert and oriented to person, place and time; no apparent distress  HEENT: normocephalic moist mucus membranes  Respiratory: lungs clear to auscultation bilaterally  Cardiovascular: regular S1 S2 3/6 freda at upper sternum  GI: abdomen soft non tender non distended bowel sounds positive  Lymph/Hematologic: pale, no cervical lymphadenopathy  Skin: multiple spider telangiectasias, good turgor  Musculoskeletal: no clubbing, cyanosis or edema; left knee ecchymosis  Neurologic: extra-ocular muscles intact; moves all four extremities; no asterixis or tongue fasciculations  Psychiatric: appropriate affect, speech non-tangential     DVT Prophylaxis: Pneumatic Compression Devices  Code Status: Full Code presumed    Disposition: Expected discharge in 0-2 days    Santo Abraham MD    Past Medical History    I have reviewed this patient's medical history and updated it with pertinent information if needed.   Past Medical History:   Diagnosis Date     Alcoholic cirrhosis of liver without ascites (H)      Alcoholic pancreatitis      Eating disorder       Hypokalemia      Hypomagnesemia      Portal hypertension (H)      Seizures (H)        Past Surgical History   I have reviewed this patient's surgical history and updated it with pertinent information if needed.  Past Surgical History:   Procedure Laterality Date     ESOPHAGOSCOPY, GASTROSCOPY, DUODENOSCOPY (EGD), COMBINED N/A 3/11/2020    Procedure: ESOPHAGOGASTRODUODENOSCOPY (EGD);  Surgeon: Leventhal, Thomas Michael, MD;  Location: [facility] OR     ORTHOPEDIC SURGERY Right        Prior to Admission Medications   Prior to Admission Medications   Prescriptions Last Dose Informant Patient Reported? Taking?   LORazepam (ATIVAN) 1 MG tablet   Yes No   Sig: Take 1 mg by mouth every 6 hours as needed for anxiety or seizures   folic acid (FOLVITE) 1 MG tablet   No No   Sig: Take 1 tablet (1 mg) by mouth daily   furosemide (LASIX) 20 MG tablet   No No   Sig: Take 1 tablet (20 mg) by mouth daily   levETIRAcetam (KEPPRA) 500 MG tablet   No No   Sig: TAKE 1 TABLET BY MOUTH EVERY MORNING AND 2 TABLETS BY MOUTH EVERY EVENING   multivitamin, therapeutic (THERA-VIT) TABS tablet   No No   Sig: Take 1 tablet by mouth daily   omeprazole (PRILOSEC) 20 MG DR capsule   No No   Sig: Take 1 capsule (20 mg) by mouth 2 times daily (before meals)   spironolactone (ALDACTONE) 25 MG tablet   No No   Sig: Take 2 tablets (50 mg) by mouth daily   vitamin B1 (THIAMINE) 100 MG tablet   No No   Sig: Take 1 tablet (100 mg) by mouth daily      Facility-Administered Medications: None     Allergies   Allergies   Allergen Reactions     Lamotrigine Other (See Comments)     confusion         Social History   I have reviewed this patient's social history and updated it with pertinent information if needed. Kota JOSSY Cowan  reports that she has never smoked. She has never used smokeless tobacco. She reports previous alcohol use of about 5.0 standard drinks of alcohol per week. She reports that she does not use drugs.    Family History   Family history assessed  and, except as above, is non-contributory.    Review of Systems   The 10 point Review of Systems is negative other than noted in the HPI or here.     Primary Care Physician   Samson Abraham    Data   Labs Ordered and Resulted from Time of ED Arrival Up to the Time of Departure from the ED   COMPREHENSIVE METABOLIC PANEL   CBC WITH PLATELETS DIFFERENTIAL   MAGNESIUM   OBSERVATION GOALS   ASSESS   NOTIFY PHYSICIAN   MEASURE HEIGHT AND WEIGHT   DAILY WEIGHTS   INTAKE AND OUTPUT   PERIPHERAL IV CATHETER   NOTIFY   NOTIFY   ACTIVITY   VITAL SIGNS       Data reviewed today:  I personally reviewed no images or EKG's today.    No results found for this or any previous visit (from the past 24 hour(s)).
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
## 2020-10-10 NOTE — PROGRESS NOTES
MD Notification    Notified Person: MD    Notified Person Name: Alexys    Notification Date/Time: 10/10/2020 1050    Notification Interaction: Verbal    Purpose of Notification: K 2.0    Orders Received:K protocol    Comments:
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
## 2020-10-10 NOTE — PHARMACY-ADMISSION MEDICATION HISTORY
Pharmacy Medication History  Admission medication history interview status for the 10/10/2020  admission is complete. See EPIC admission navigator for prior to admission medications       Medication history sources: Patient and Pharmacy ([facility] - [phone])  Location of interview: Phone   Medication history source reliability: Moderate  Adherence assessment: Poor    Significant changes made to the medication list:  Changed the direction of Aldactone, added potassium and midodrine   , per patient report and pharmacy record       Additional medication history information:   Patient is not adherent to her medications. She reported she ran out of her potassium, Midodrine and aldactone for a while. She stated she was suppose to see her doctor to get a refill but she didn't. She denies taking any other OTC or herbal medications.     Medication reconciliation completed by provider prior to medication history? No    Time spent in this activity: 20 minutes       Prior to Admission medications    Medication Sig Last Dose Taking? Auth Provider   folic acid (FOLVITE) 1 MG tablet Take 1 tablet (1 mg) by mouth daily Past Week at Unknown time Yes Ally Bustamante PA   furosemide (LASIX) 20 MG tablet Take 1 tablet (20 mg) by mouth daily 10/9/2020 at Unknown time Yes Sara Rahman PA-C   levETIRAcetam (KEPPRA) 500 MG tablet TAKE 1 TABLET BY MOUTH EVERY MORNING AND 2 TABLETS BY MOUTH EVERY EVENING 10/9/2020 at Unknown time Yes Renee Vázquez MD   LORazepam (ATIVAN) 1 MG tablet Take 1 mg by mouth every 6 hours as needed for anxiety or seizures  at PRN  Yes Reported, Patient   midodrine (PROAMATINE) 10 MG tablet Take 10 mg by mouth 3 times daily Past Month at Unknown time Yes Unknown, Entered By History   multivitamin, therapeutic (THERA-VIT) TABS tablet Take 1 tablet by mouth daily  at Unknown time Yes Ally Bustamante PA   omeprazole (PRILOSEC) 20 MG DR capsule Take 1 capsule (20 mg) by  mouth 2 times daily (before meals)  at Unknown time Yes Sara Rahman PA-C   potassium chloride ER (KLOR-CON M) 20 MEQ CR tablet Take 20 mEq by mouth daily (with breakfast) 10/9/2020 at Unknown time Yes Unknown, Entered By History   spironolactone (ALDACTONE) 25 MG tablet Take 75 mg by mouth daily Past Month at Unknown time Yes Unknown, Entered By History   vitamin B1 (THIAMINE) 100 MG tablet Take 1 tablet (100 mg) by mouth daily  at Unknown time Yes Ally Bustamante PA Elham Said   Student Pharmacist

## 2020-10-10 NOTE — PROGRESS NOTES
Observation goals PRIOR TO DISCHARGE     Comments: -diagnostic tests and consults completed and resulted: Met  -vital signs normal or at patient baseline: Met  -tolerating oral intake to maintain hydration: Met  -returns to baseline functional status: Met, neuros intact    -safe disposition plan has been identified: Not met, SW to consult   Nurse to notify provider when observation goals have been met and patient is ready for discharge.

## 2020-10-11 VITALS
WEIGHT: 114.2 LBS | BODY MASS INDEX: 18.43 KG/M2 | DIASTOLIC BLOOD PRESSURE: 59 MMHG | SYSTOLIC BLOOD PRESSURE: 92 MMHG | TEMPERATURE: 98.3 F | RESPIRATION RATE: 14 BRPM | HEART RATE: 82 BPM | OXYGEN SATURATION: 98 %

## 2020-10-11 LAB
ALBUMIN SERPL-MCNC: 2 G/DL (ref 3.4–5)
ALP SERPL-CCNC: 147 U/L (ref 40–150)
ALT SERPL W P-5'-P-CCNC: 30 U/L (ref 0–50)
AMMONIA PLAS-SCNC: 134 UMOL/L (ref 10–50)
ANION GAP SERPL CALCULATED.3IONS-SCNC: 8 MMOL/L (ref 3–14)
AST SERPL W P-5'-P-CCNC: 130 U/L (ref 0–45)
BILIRUB SERPL-MCNC: 1.4 MG/DL (ref 0.2–1.3)
BUN SERPL-MCNC: 3 MG/DL (ref 7–30)
CALCIUM SERPL-MCNC: 7.3 MG/DL (ref 8.5–10.1)
CHLORIDE SERPL-SCNC: 114 MMOL/L (ref 94–109)
CO2 SERPL-SCNC: 15 MMOL/L (ref 20–32)
CREAT SERPL-MCNC: 0.51 MG/DL (ref 0.52–1.04)
ERYTHROCYTE [DISTWIDTH] IN BLOOD BY AUTOMATED COUNT: 13.1 % (ref 10–15)
GFR SERPL CREATININE-BSD FRML MDRD: >90 ML/MIN/{1.73_M2}
GLUCOSE SERPL-MCNC: 110 MG/DL (ref 70–99)
HCT VFR BLD AUTO: 24.8 % (ref 35–47)
HGB BLD-MCNC: 8.8 G/DL (ref 11.7–15.7)
MAGNESIUM SERPL-MCNC: 2.5 MG/DL (ref 1.6–2.3)
MCH RBC QN AUTO: 36.8 PG (ref 26.5–33)
MCHC RBC AUTO-ENTMCNC: 35.5 G/DL (ref 31.5–36.5)
MCV RBC AUTO: 104 FL (ref 78–100)
PLATELET # BLD AUTO: 128 10E9/L (ref 150–450)
POTASSIUM SERPL-SCNC: 3 MMOL/L (ref 3.4–5.3)
PROT SERPL-MCNC: 5.5 G/DL (ref 6.8–8.8)
RBC # BLD AUTO: 2.39 10E12/L (ref 3.8–5.2)
SODIUM SERPL-SCNC: 137 MMOL/L (ref 133–144)
WBC # BLD AUTO: 3.5 10E9/L (ref 4–11)

## 2020-10-11 PROCEDURE — 80053 COMPREHEN METABOLIC PANEL: CPT | Performed by: INTERNAL MEDICINE

## 2020-10-11 PROCEDURE — 83735 ASSAY OF MAGNESIUM: CPT | Performed by: INTERNAL MEDICINE

## 2020-10-11 PROCEDURE — 250N000013 HC RX MED GY IP 250 OP 250 PS 637: Performed by: HOSPITALIST

## 2020-10-11 PROCEDURE — 250N000011 HC RX IP 250 OP 636: Performed by: HOSPITALIST

## 2020-10-11 PROCEDURE — 99207 PR CDG-CODE CATEGORY CHANGED: CPT | Performed by: INTERNAL MEDICINE

## 2020-10-11 PROCEDURE — G0378 HOSPITAL OBSERVATION PER HR: HCPCS

## 2020-10-11 PROCEDURE — 99217 PR OBSERVATION CARE DISCHARGE: CPT | Performed by: INTERNAL MEDICINE

## 2020-10-11 PROCEDURE — 96366 THER/PROPH/DIAG IV INF ADDON: CPT

## 2020-10-11 PROCEDURE — 85027 COMPLETE CBC AUTOMATED: CPT | Performed by: INTERNAL MEDICINE

## 2020-10-11 PROCEDURE — 84132 ASSAY OF SERUM POTASSIUM: CPT | Performed by: INTERNAL MEDICINE

## 2020-10-11 PROCEDURE — 250N000013 HC RX MED GY IP 250 OP 250 PS 637: Performed by: INTERNAL MEDICINE

## 2020-10-11 PROCEDURE — 82140 ASSAY OF AMMONIA: CPT | Performed by: INTERNAL MEDICINE

## 2020-10-11 PROCEDURE — 36415 COLL VENOUS BLD VENIPUNCTURE: CPT | Performed by: INTERNAL MEDICINE

## 2020-10-11 RX ORDER — LANOLIN ALCOHOL/MO/W.PET/CERES
100 CREAM (GRAM) TOPICAL DAILY
Qty: 30 TABLET | Refills: 1 | Status: SHIPPED | OUTPATIENT
Start: 2020-10-11

## 2020-10-11 RX ORDER — FOLIC ACID 1 MG/1
1 TABLET ORAL DAILY
Qty: 30 TABLET | Refills: 1 | Status: SHIPPED | OUTPATIENT
Start: 2020-10-11 | End: 2021-02-09

## 2020-10-11 RX ORDER — POTASSIUM CHLORIDE 1500 MG/1
40 TABLET, EXTENDED RELEASE ORAL DAILY
Qty: 30 TABLET | Refills: 0 | Status: SHIPPED | OUTPATIENT
Start: 2020-10-11 | End: 2020-10-26

## 2020-10-11 RX ORDER — SPIRONOLACTONE 25 MG/1
75 TABLET ORAL DAILY
Qty: 30 TABLET | Refills: 0 | Status: SHIPPED | OUTPATIENT
Start: 2020-10-11 | End: 2020-10-20

## 2020-10-11 RX ORDER — NALTREXONE HYDROCHLORIDE 50 MG/1
50 TABLET, FILM COATED ORAL DAILY
Qty: 30 TABLET | Refills: 1 | Status: SHIPPED | OUTPATIENT
Start: 2020-10-11 | End: 2022-01-20

## 2020-10-11 RX ORDER — NALTREXONE HYDROCHLORIDE 50 MG/1
50 TABLET, FILM COATED ORAL DAILY
Status: DISCONTINUED | OUTPATIENT
Start: 2020-10-11 | End: 2020-10-11 | Stop reason: HOSPADM

## 2020-10-11 RX ORDER — LACTULOSE 10 G/15ML
20 SOLUTION ORAL DAILY
Qty: 946 ML | Refills: 0 | Status: SHIPPED | OUTPATIENT
Start: 2020-10-11

## 2020-10-11 RX ORDER — POTASSIUM CHLORIDE 1.5 G/1.58G
20 POWDER, FOR SOLUTION ORAL 2 TIMES DAILY
Status: DISCONTINUED | OUTPATIENT
Start: 2020-10-11 | End: 2020-10-11 | Stop reason: HOSPADM

## 2020-10-11 RX ADMIN — Medication 10 MEQ: at 00:32

## 2020-10-11 RX ADMIN — POTASSIUM CHLORIDE 20 MEQ: 1500 TABLET, EXTENDED RELEASE ORAL at 12:32

## 2020-10-11 RX ADMIN — SPIRONOLACTONE 75 MG: 25 TABLET, FILM COATED ORAL at 10:25

## 2020-10-11 RX ADMIN — POTASSIUM CHLORIDE 40 MEQ: 1500 TABLET, EXTENDED RELEASE ORAL at 09:51

## 2020-10-11 RX ADMIN — Medication 10 MEQ: at 02:38

## 2020-10-11 RX ADMIN — LEVETIRACETAM 500 MG: 500 TABLET ORAL at 09:51

## 2020-10-11 RX ADMIN — OMEPRAZOLE 20 MG: 20 CAPSULE, DELAYED RELEASE ORAL at 09:51

## 2020-10-11 RX ADMIN — Medication 10 MEQ: at 04:40

## 2020-10-11 RX ADMIN — LACTULOSE 20 G: 10 SOLUTION ORAL at 10:25

## 2020-10-11 RX ADMIN — THERA TABS 1 TABLET: TAB at 09:52

## 2020-10-11 RX ADMIN — Medication 10 MEQ: at 01:35

## 2020-10-11 RX ADMIN — NALTREXONE HYDROCHLORIDE 50 MG: 50 TABLET, FILM COATED ORAL at 12:32

## 2020-10-11 RX ADMIN — MIDODRINE HYDROCHLORIDE 10 MG: 5 TABLET ORAL at 09:51

## 2020-10-11 RX ADMIN — POTASSIUM CHLORIDE 20 MEQ: 1.5 POWDER, FOR SOLUTION ORAL at 09:51

## 2020-10-11 RX ADMIN — Medication 10 MEQ: at 03:42

## 2020-10-11 RX ADMIN — Medication 10 MEQ: at 05:54

## 2020-10-11 NOTE — PROVIDER NOTIFICATION
MD Notification    Notified Person: MD    Notified Person Name: White    Notification Date/Time: 10/10/20 2129    Notification Interaction: amcom page    Purpose of Notification: FYI: K 2.2, previously 2.0, will cont to replace.    Orders Received:    Comments:
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
## 2020-10-11 NOTE — DISCHARGE SUMMARY
[facility]    Hospitalist Discharge Summary       Date of Admission:  10/10/2020  Date of Discharge:  10/11/2020  Discharging Provider: Quinton Reardon MD      Discharge Diagnoses   Severe hypokalemia  Alcoholic hepatitis  Alcohol use disorder  Pancytopenia secondary to above  Anxiety    Follow-ups Needed After Discharge   Follow-up Appointments     Follow-up and recommended labs and tests       Follow up with primary care provider, Samson Abraham, within 7 days for   hospital follow- up.  The following labs/tests are recommended:   comprehensive metabolic panel in 4-5 days.  - Follow up with gastroenterology/hepatology clinic within a month if   possible  - Follow up with Dr. Davi Hunter or another psychiatrist of your   choosing, within a month           Hospital Course   Kota Cowan is a 31yo F with PMH of decompensated alcoholic cirrhosis, seizure disorder who was admitted on 10/10 for severe hypokalemia of 2.0 on admission, as well as confusion. She reports amnesia prior to her admission. She improved quickly with potassium replacement, with mental status improving. However she was noted to have continued sluggishness with speech and trouble with simple arithmetic (serial 7's), which was felt to be consistent with mild hepatic encephalopathy. She had an ammonia of 134 this admission, and was started on lactulose therapy with improvement in mental status on hospital day 2. Bilirubin rising slightly to 1.4 on day of discharge but AST improving; this is related to alcoholic hepatitis.   Patient reports a history of alcoholic cirrhosis and ascites, last saw a hepatologist here at [place] in Feb 2020. She reports low level drinking and even cutting back until around August 2020, but a number of returning stressors and dealing with one of her children's fathers, prompted her to start drinking more. She reports drinking up to 1/2 pint of vodka a day on some days prior to admission.  She is not interested in outpatient therapy due to bad previous experiences, however she is open to pharmacotherapy. We discussed naltrexone and patient is agreeable to seeing. In addition to indefinite alcohol cessation, I also recommended patient follow up with a mental health provider to address some of the underlying triggers of her alcohol use. Patient is motivated to quit and is considered safe to discharge for ongoing outpatient management.    Consultations This Hospital Stay   GASTROENTEROLOGY IP CONSULT  PSYCHIATRY IP CONSULT  CARE MANAGEMENT / SOCIAL WORK IP CONSULT    Code Status   Full Code    Time Spent on this Encounter   I, Quinton Reardon, personally saw the patient today and spent approximately 35 minutes discharging this patient.       Quinton Reardon MD  [facility]  ______________________________________________________________________    Physical Exam   Vital Signs: Temp: 98.3  F (36.8  C) Temp src: Oral BP: 92/59 Pulse: 82   Resp: 14 SpO2: 98 % O2 Device: None (Room air)    Weight: 114 lbs 3.17 oz    Constitutional: Young female in NAD  Eyes: PERRL, nonicteric  HEENT: Normocephalic, atraumatic, oral mucosa moist  Respiratory: CTAB, no wheezing or crackles  Cardiovascular: RRR, normal S1/2, mild systolic murmur  GI: Nontender, nondistended  Skin: Spider angiomata trunk, also some mild skin mottling over lower back. Noted bruise over left knee  Musculoskeletal: Normal strength in UE and LE, moves all extremities  Neurologic: A&Ox3  Psychiatric: Appropriate affect and mood       Primary Care Physician   Samson Abraham    Discharge Disposition   Discharged to home  Condition at discharge: Stable    Significant Results and Procedures   Most Recent 3 CBC's:  Recent Labs   Lab Test 10/11/20  0752 10/10/20  0923 02/05/20  0814   WBC 3.5* 4.2 5.4   HGB 8.8* 9.3* 9.5*   * 102* 102*   * 152 370     Most Recent 3 BMP's:  Recent Labs   Lab Test 10/11/20  0752  10/10/20  2250 10/10/20  0923 02/05/20  0814     --  132* 137   POTASSIUM 3.0* 2.2* 2.0* 3.7   CHLORIDE 114*  --  104 108   CO2 15*  --  19* 22   BUN 3*  --  7 6*   CR 0.51*  --  0.58 0.48*   ANIONGAP 8  --  9 7   SAMIRA 7.3*  --  7.5* 8.1*   *  --  130* 110*     Most Recent 2 LFT's:  Recent Labs   Lab Test 10/11/20  0752 10/10/20  0923   * 149*   ALT 30 36   ALKPHOS 147 160*   BILITOTAL 1.4* 1.1   ,   Results for orders placed or performed during the hospital encounter of 01/15/20   US Abdomen Limited    Narrative    EXAMINATION: Limited Abdominal Ultrasound, 1/16/2020 8:14 AM     COMPARISON: None.    HISTORY: 29-year-old female with high LFTs and bili.    Additional history: history of alcohol abuse, alcohol induced  pancreatitis and hepatitis as well as anemia.?    FINDINGS:   Fluid: Moderate amount of ascites in the right upper quadrant.    Liver: Echogenic hepatic parenchyma, measuring 15.5 cm in craniocaudal  dimension. There is no focal mass.     Gallbladder: There is no wall thickening, pericholecystic fluid,  positive sonographic Gallardo's sign or evidence for cholelithiasis.    Bile Ducts: Both the intra- and extrahepatic biliary system are of  normal caliber. The common bile duct is not well-visualized.    Pancreas: Not visualized. There is a round, avascular, anechoic cystic  structure with increased through transmission adjacent to the pancreas  that does not communicate with the perihepatic fluid collection or  stomach and possibly arises from the pancreas.    Kidney: The right kidney measures 10.7 cm long. There is no  hydronephrosis or hydroureter, no shadowing renal calculi, cystic  lesion or mass.       Impression    IMPRESSION:   1.  Echogenic liver appearance suggesting hepatic steatosis. Moderate  amount of abdominal ascites. No acute gallbladder pathology  appreciated.  2.  The pancreas is not well-visualized.   3. Benign appearing anechoic cystic structure adjacent to  the  pancreas.   May represent a pseudocyst.      I have personally reviewed the examination and initial interpretation  and I agree with the findings.    LISETTE DIAZ MD   XR Chest Port 1 View    Narrative    EXAMINATION: XR CHEST PORT 1 VW, 1/17/2020 8:54 PM    INDICATION: fever, elevated procalcitonin    COMPARISON: None available    FINDINGS: Single portable AP radiograph of chest. Adjacent lung. The  cardiomediastinal silhouette is within normal limits. Small left  pleural effusion. No right pleural effusion. No pneumothorax. No focal  airspace opacity. Partially visualized upper abdomen is unremarkable.  Subacute lateral right rib 10 fracture. No acute osseous pathology.  Soft tissues unremarkable.      Impression    IMPRESSION: No acute radiographically evident pulmonary abnormality.    I have personally reviewed the examination and initial interpretation  and I agree with the findings.    LISETTE DIAZ MD   US Pelvic Complete with Transvaginal    Narrative    EXAMINATION: US PELVIC COMPLETE WITH TRANSVAGINAL, 1/18/2020 2:45 PM     COMPARISON: Abdominal ultrasound 1/16/2020    HISTORY: please eval for structural abnormalities causing abnormal  vaginal bleeding    TECHNIQUE: The pelvis was scanned in standard fashion with  transabdominal and transvaginal transducer(s) using both grey scale  and limited color Doppler techniques.    FINDINGS:  The uterus measures 7.9 x 3.4 x 3.0. There is no evidence of a focal  fibroid. Uterus is retroverted. The endometrium is within normal  limits and measures 0.2 cm. There is no free fluid in the pelvis.    The right ovary measures 1.7 x 3.4 x 2.2 cm and the left ovary  measures 1.7 x 3.5 x 2.0 cm. There is no adnexal mass. There is normal  blood flow to the ovaries.    Moderate to large simple free fluid in the pelvis.      Impression    IMPRESSION:   1. No findings to explain patient's heavy vaginal bleeding.  2. Moderate to large volume free fluid in the  pelvis. This is presumed  to be ascites given the patient's history of alcohol use. The amount  of ascites appears to be increased when compared to previous abdominal  ultrasound dated 1/16/2020.    [Result: Increase in ascites]    Finding was identified on 1/18/2020 2:53 PM.     Wilmer Mendes was contacted by Dr. Hendrix at 1/18/2020 3:20 PM  and verbalized understanding of the urgent finding.      I have personally reviewed the examination and initial interpretation  and I agree with the findings.    LISETTE DIAZ MD       Discharge Orders      Comprehensive metabolic panel     Reason for your hospital stay    You were hospitalized for alcoholic hepatitis and severely low potassium levels. You also have mild hepatic encephalopathy. You improved with potassium replacement, and lactulose.     Follow-up and recommended labs and tests     Follow up with primary care provider, Samson Abraham, within 7 days for hospital follow- up.  The following labs/tests are recommended: comprehensive metabolic panel in 4-5 days.  - Follow up with gastroenterology/hepatology clinic within a month if possible  - Follow up with Dr. Davi Hunter or another psychiatrist of your choosing, within a month     Activity    Your activity upon discharge: activity as tolerated     Discharge Instructions    You are recommended to stop alcohol use indefinitely. You are being prescribed naltrexone. It will help reduce the benefits of drinking alcohol. You are recommended to seek resources outside of the hospital to abstain from alcohol, such as AA, psychologist/psychiatrist, etc.     Diet    Follow this diet upon discharge: Low sodium diet     Discharge Medications   Current Discharge Medication List      START taking these medications    Details   lactulose (CHRONULAC) 10 GM/15ML solution Take 30 mLs (20 g) by mouth daily  Qty: 946 mL, Refills: 0    Associated Diagnoses: Alcoholic cirrhosis of liver with ascites (H)      naltrexone  (DEPADE/REVIA) 50 MG tablet Take 1 tablet (50 mg) by mouth daily  Qty: 30 tablet, Refills: 1    Associated Diagnoses: Alcoholic cirrhosis of liver with ascites (H)      potassium chloride ER (K-TAB) 20 MEQ CR tablet Take 2 tablets (40 mEq) by mouth daily for 15 days  Qty: 30 tablet, Refills: 0    Associated Diagnoses: Alcoholic cirrhosis of liver with ascites (H)         CONTINUE these medications which have CHANGED    Details   folic acid (FOLVITE) 1 MG tablet Take 1 tablet (1 mg) by mouth daily  Qty: 30 tablet, Refills: 1    Associated Diagnoses: Alcohol use, unspecified with other alcohol-induced disorder (H)      spironolactone (ALDACTONE) 25 MG tablet Take 3 tablets (75 mg) by mouth daily  Qty: 30 tablet, Refills: 0    Associated Diagnoses: Alcoholic cirrhosis of liver with ascites (H)      thiamine (B-1) 100 MG tablet Take 1 tablet (100 mg) by mouth daily  Qty: 30 tablet, Refills: 1    Associated Diagnoses: Alcohol use, unspecified with other alcohol-induced disorder (H)         CONTINUE these medications which have NOT CHANGED    Details   furosemide (LASIX) 20 MG tablet Take 1 tablet (20 mg) by mouth daily  Qty: 30 tablet, Refills: 6    Associated Diagnoses: Alcoholic hepatitis with ascites      levETIRAcetam (KEPPRA) 500 MG tablet TAKE 1 TABLET BY MOUTH EVERY MORNING AND 2 TABLETS BY MOUTH EVERY EVENING  Qty: 270 tablet, Refills: 1    Associated Diagnoses: Focal epilepsy (H)      LORazepam (ATIVAN) 1 MG tablet Take 1 mg by mouth every 6 hours as needed for anxiety or seizures      midodrine (PROAMATINE) 10 MG tablet Take 10 mg by mouth 3 times daily      multivitamin, therapeutic (THERA-VIT) TABS tablet Take 1 tablet by mouth daily  Qty: 90 tablet, Refills: 1    Associated Diagnoses: Alcohol use, unspecified with other alcohol-induced disorder (H)      omeprazole (PRILOSEC) 20 MG DR capsule Take 1 capsule (20 mg) by mouth 2 times daily (before meals)  Qty: 60 capsule, Refills: 1    Associated Diagnoses:  Alcohol use, unspecified with other alcohol-induced disorder (H)      potassium chloride ER (KLOR-CON M) 20 MEQ CR tablet Take 20 mEq by mouth daily (with breakfast)           Allergies   Allergies   Allergen Reactions     Lamotrigine Other (See Comments)     confusion

## 2020-10-11 NOTE — PLAN OF CARE
AVS and discharge meds reviewed at bedside with patient and her spouse; understanding verbalized.
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
## 2020-10-11 NOTE — PROGRESS NOTES
"MD Notification    Notified Person: MD    Notified Person Name: Alexys    Notification Date/Time: 10/11/20 9:30 AM      Notification Interaction: text page    Purpose of Notification: \"Do you want pt to get the scheduled potassium AND the prn replacement doses? Please advise. Thanks.\"    Orders Received:    Comments:      "

## 2020-10-11 NOTE — PROGRESS NOTES
[facility]    Hospitalist Discharge Summary       Date of Admission:  10/10/2020  Date of Discharge:  10/11/2020  Discharging Provider: Quinton Reardon MD      Discharge Diagnoses   Severe hypokalemia  Alcoholic hepatitis  Alcohol use disorder  Anxiety    Follow-ups Needed After Discharge   Follow-up Appointments     Follow-up and recommended labs and tests       Follow up with primary care provider, Samson Abraham, within 7 days for   hospital follow- up.  The following labs/tests are recommended:   comprehensive metabolic panel in 4-5 days.  - Follow up with gastroenterology/hepatology clinic within a month if   possible  - Follow up with Dr. Davi Hunter or another psychiatrist of your   choosing, within a month           Hospital Course   Kota Cowan is a 29yo F with PMH of decompensated alcoholic cirrhosis, seizure disorder who was admitted on 10/10 for severe hypokalemia of 2.0 on admission, as well as confusion. She reports amnesia prior to her admission. She improved quickly with potassium replacement, with mental status improving. However she was noted to have continued sluggishness with speech and trouble with simple arithmetic (serial 7's), which was felt to be consistent with mild hepatic encephalopathy. She had an ammonia of 134 this admission, and was started on lactulose therapy with improvement in mental status on hospital day 2. Bilirubin rising slightly to 1.4 on day of discharge but AST improving; this is related to alcoholic hepatitis.   Patient reports a history of alcoholic cirrhosis and ascites, last saw a hepatologist here at [place] in Feb 2020. She reports low level drinking and even cutting back until around August 2020, but a number of returning stressors and dealing with one of her children's fathers, prompted her to start drinking more. She reports drinking up to 1/2 pint of vodka a day on some days prior to admission. She is not interested in  outpatient therapy due to bad previous experiences, however she is open to pharmacotherapy. We discussed naltrexone and patient is agreeable to seeing. In addition to indefinite alcohol cessation, I also recommended patient follow up with a mental health provider to address some of the underlying triggers of her alcohol use. Patient is motivated to quit and is considered safe to discharge for ongoing outpatient management.    Consultations This Hospital Stay   GASTROENTEROLOGY IP CONSULT  PSYCHIATRY IP CONSULT  CARE MANAGEMENT / SOCIAL WORK IP CONSULT    Code Status   Full Code    Time Spent on this Encounter   I, Quinton Reardon, personally saw the patient today and spent approximately 35 minutes discharging this patient.       Quinton Reardon MD  [facility]  ______________________________________________________________________    Physical Exam   Vital Signs: Temp: 98.3  F (36.8  C) Temp src: Oral BP: 92/59 Pulse: 82   Resp: 14 SpO2: 98 % O2 Device: None (Room air)    Weight: 114 lbs 3.17 oz    Constitutional: Young female in NAD  Eyes: PERRL, nonicteric  HEENT: Normocephalic, atraumatic, oral mucosa moist  Respiratory: CTAB, no wheezing or crackles  Cardiovascular: RRR, normal S1/2, mild systolic murmur  GI: Nontender, nondistended  Skin: Spider angiomata trunk, also some mild skin mottling over lower back. Noted bruise over left knee  Musculoskeletal: Normal strength in UE and LE, moves all extremities  Neurologic: A&Ox3  Psychiatric: Appropriate affect and mood       Primary Care Physician   Samson Abraham    Discharge Disposition   Discharged to home  Condition at discharge: Stable    Significant Results and Procedures   Most Recent 3 CBC's:  Recent Labs   Lab Test 10/11/20  0752 10/10/20  0923 02/05/20  0814   WBC 3.5* 4.2 5.4   HGB 8.8* 9.3* 9.5*   * 102* 102*   * 152 370     Most Recent 3 BMP's:  Recent Labs   Lab Test 10/11/20  0752 10/10/20  2250 10/10/20  0923  02/05/20  0814     --  132* 137   POTASSIUM 3.0* 2.2* 2.0* 3.7   CHLORIDE 114*  --  104 108   CO2 15*  --  19* 22   BUN 3*  --  7 6*   CR 0.51*  --  0.58 0.48*   ANIONGAP 8  --  9 7   SAMIRA 7.3*  --  7.5* 8.1*   *  --  130* 110*     Most Recent 2 LFT's:  Recent Labs   Lab Test 10/11/20  0752 10/10/20  0923   * 149*   ALT 30 36   ALKPHOS 147 160*   BILITOTAL 1.4* 1.1   ,   Results for orders placed or performed during the hospital encounter of 01/15/20   US Abdomen Limited    Narrative    EXAMINATION: Limited Abdominal Ultrasound, 1/16/2020 8:14 AM     COMPARISON: None.    HISTORY: 29-year-old female with high LFTs and bili.    Additional history: history of alcohol abuse, alcohol induced  pancreatitis and hepatitis as well as anemia.?    FINDINGS:   Fluid: Moderate amount of ascites in the right upper quadrant.    Liver: Echogenic hepatic parenchyma, measuring 15.5 cm in craniocaudal  dimension. There is no focal mass.     Gallbladder: There is no wall thickening, pericholecystic fluid,  positive sonographic Gallardo's sign or evidence for cholelithiasis.    Bile Ducts: Both the intra- and extrahepatic biliary system are of  normal caliber. The common bile duct is not well-visualized.    Pancreas: Not visualized. There is a round, avascular, anechoic cystic  structure with increased through transmission adjacent to the pancreas  that does not communicate with the perihepatic fluid collection or  stomach and possibly arises from the pancreas.    Kidney: The right kidney measures 10.7 cm long. There is no  hydronephrosis or hydroureter, no shadowing renal calculi, cystic  lesion or mass.       Impression    IMPRESSION:   1.  Echogenic liver appearance suggesting hepatic steatosis. Moderate  amount of abdominal ascites. No acute gallbladder pathology  appreciated.  2.  The pancreas is not well-visualized.   3. Benign appearing anechoic cystic structure adjacent to the  pancreas.   May represent a  pseudocyst.      I have personally reviewed the examination and initial interpretation  and I agree with the findings.    LISETTE DIAZ MD   XR Chest Port 1 View    Narrative    EXAMINATION: XR CHEST PORT 1 VW, 1/17/2020 8:54 PM    INDICATION: fever, elevated procalcitonin    COMPARISON: None available    FINDINGS: Single portable AP radiograph of chest. Adjacent lung. The  cardiomediastinal silhouette is within normal limits. Small left  pleural effusion. No right pleural effusion. No pneumothorax. No focal  airspace opacity. Partially visualized upper abdomen is unremarkable.  Subacute lateral right rib 10 fracture. No acute osseous pathology.  Soft tissues unremarkable.      Impression    IMPRESSION: No acute radiographically evident pulmonary abnormality.    I have personally reviewed the examination and initial interpretation  and I agree with the findings.    LISETTE DIAZ MD   US Pelvic Complete with Transvaginal    Narrative    EXAMINATION: US PELVIC COMPLETE WITH TRANSVAGINAL, 1/18/2020 2:45 PM     COMPARISON: Abdominal ultrasound 1/16/2020    HISTORY: please eval for structural abnormalities causing abnormal  vaginal bleeding    TECHNIQUE: The pelvis was scanned in standard fashion with  transabdominal and transvaginal transducer(s) using both grey scale  and limited color Doppler techniques.    FINDINGS:  The uterus measures 7.9 x 3.4 x 3.0. There is no evidence of a focal  fibroid. Uterus is retroverted. The endometrium is within normal  limits and measures 0.2 cm. There is no free fluid in the pelvis.    The right ovary measures 1.7 x 3.4 x 2.2 cm and the left ovary  measures 1.7 x 3.5 x 2.0 cm. There is no adnexal mass. There is normal  blood flow to the ovaries.    Moderate to large simple free fluid in the pelvis.      Impression    IMPRESSION:   1. No findings to explain patient's heavy vaginal bleeding.  2. Moderate to large volume free fluid in the pelvis. This is presumed  to be  ascites given the patient's history of alcohol use. The amount  of ascites appears to be increased when compared to previous abdominal  ultrasound dated 1/16/2020.    [Result: Increase in ascites]    Finding was identified on 1/18/2020 2:53 PM.     Wilmer Mendes was contacted by Dr. Hendrix at 1/18/2020 3:20 PM  and verbalized understanding of the urgent finding.      I have personally reviewed the examination and initial interpretation  and I agree with the findings.    LISETTE DIAZ MD       Discharge Orders      Comprehensive metabolic panel     Reason for your hospital stay    You were hospitalized for alcoholic hepatitis and severely low potassium levels. You also have mild hepatic encephalopathy. You improved with potassium replacement, and lactulose.     Follow-up and recommended labs and tests     Follow up with primary care provider, Samson Abraham, within 7 days for hospital follow- up.  The following labs/tests are recommended: comprehensive metabolic panel in 4-5 days.  - Follow up with gastroenterology/hepatology clinic within a month if possible  - Follow up with Dr. Davi Hunter or another psychiatrist of your choosing, within a month     Activity    Your activity upon discharge: activity as tolerated     Discharge Instructions    You are recommended to stop alcohol use indefinitely. You are being prescribed naltrexone. It will help reduce the benefits of drinking alcohol. You are recommended to seek resources outside of the hospital to abstain from alcohol, such as AA, psychologist/psychiatrist, etc.     Diet    Follow this diet upon discharge: Low sodium diet     Discharge Medications   Current Discharge Medication List      START taking these medications    Details   lactulose (CHRONULAC) 10 GM/15ML solution Take 30 mLs (20 g) by mouth daily  Qty: 946 mL, Refills: 0    Associated Diagnoses: Alcoholic cirrhosis of liver with ascites (H)      naltrexone (DEPADE/REVIA) 50 MG tablet Take 1  tablet (50 mg) by mouth daily  Qty: 30 tablet, Refills: 1    Associated Diagnoses: Alcoholic cirrhosis of liver with ascites (H)      potassium chloride ER (K-TAB) 20 MEQ CR tablet Take 2 tablets (40 mEq) by mouth daily for 15 days  Qty: 30 tablet, Refills: 0    Associated Diagnoses: Alcoholic cirrhosis of liver with ascites (H)         CONTINUE these medications which have CHANGED    Details   folic acid (FOLVITE) 1 MG tablet Take 1 tablet (1 mg) by mouth daily  Qty: 30 tablet, Refills: 1    Associated Diagnoses: Alcohol use, unspecified with other alcohol-induced disorder (H)      spironolactone (ALDACTONE) 25 MG tablet Take 3 tablets (75 mg) by mouth daily  Qty: 30 tablet, Refills: 0    Associated Diagnoses: Alcoholic cirrhosis of liver with ascites (H)      thiamine (B-1) 100 MG tablet Take 1 tablet (100 mg) by mouth daily  Qty: 30 tablet, Refills: 1    Associated Diagnoses: Alcohol use, unspecified with other alcohol-induced disorder (H)         CONTINUE these medications which have NOT CHANGED    Details   furosemide (LASIX) 20 MG tablet Take 1 tablet (20 mg) by mouth daily  Qty: 30 tablet, Refills: 6    Associated Diagnoses: Alcoholic hepatitis with ascites      levETIRAcetam (KEPPRA) 500 MG tablet TAKE 1 TABLET BY MOUTH EVERY MORNING AND 2 TABLETS BY MOUTH EVERY EVENING  Qty: 270 tablet, Refills: 1    Associated Diagnoses: Focal epilepsy (H)      LORazepam (ATIVAN) 1 MG tablet Take 1 mg by mouth every 6 hours as needed for anxiety or seizures      midodrine (PROAMATINE) 10 MG tablet Take 10 mg by mouth 3 times daily      multivitamin, therapeutic (THERA-VIT) TABS tablet Take 1 tablet by mouth daily  Qty: 90 tablet, Refills: 1    Associated Diagnoses: Alcohol use, unspecified with other alcohol-induced disorder (H)      omeprazole (PRILOSEC) 20 MG DR capsule Take 1 capsule (20 mg) by mouth 2 times daily (before meals)  Qty: 60 capsule, Refills: 1    Associated Diagnoses: Alcohol use, unspecified with other  alcohol-induced disorder (H)      potassium chloride ER (KLOR-CON M) 20 MEQ CR tablet Take 20 mEq by mouth daily (with breakfast)           Allergies   Allergies   Allergen Reactions     Lamotrigine Other (See Comments)     confusion
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
## 2020-10-12 NOTE — CONSULTS
[facility]  Psychiatry Consultation      Patient name: Kota Cowan   MRN: [id number]    Age: 30 year old    YOB: 1990    Identifying information:   The patient is a 30 year old White female who is currently admitted to the medical unit on 66    Reason for consult: Assistant comanaging alcohol use disorder    History of present illness:   Patient has a history of alcohol use disorder and seizure disorder who presented to the emergency room for evaluation of confusion.  She was noted to have elevated ammonia and hypokalemia in the setting of alcohol usage.  Her blood alcohol level was initially 0.202, serum ammonia 129, and potassium of 1.8.  She had reported running out of her diuretic medication.  She was admitted to the hospital for medical work-up and stabilization and psychiatry was consulted to assist in comanaging her alcohol use disorder.  On examination, the patient reported drinking 2-3 spirits 2 to 3 days out of the week.  She denied any recent withdrawal symptoms.  She does not perceive her usage as being out of her control.  Overall, she perceives having reduced her alcohol usage over the past few years.  She reports that her mood is stable and denied any significant depressed or anxious mood.  No psychotic symptoms are reported.  She denied psychosocial stressors.    Psychiatric Review of Systems:    -- Depressive episode: Denied symptoms including denial of suicidal ideation.  -- Gillian:  denies symptoms  -- Psychosis:  denies symptoms  -- Anxiety: denies symptoms corresponding to PAYTON or panic disorder  -- PTSD: denies symptoms  -- OCD: denies  symptoms  -- Eating disorder: denies symptoms    Psychiatric History:    1 prior psychiatric admission which had occurred over 5 years ago following an accidental overdose.  She denied any prior suicide attempts.  She currently is not receiving mental health treatment.    Substance Use History:    History of alcohol  use disorder with pattern of usage corresponding to dependence involving progressive usage, drinking to the point of blackout and intoxication although this seems to have improved recently, accumulating consequences which have extended to medical complications including cirrhosis.  She has attended chemical dependency treatment at least 1 time.  No history of admissions to detox.  No history of withdrawal related seizures although she does have a seizure disorder.    Medical History:  Refer to the internal medicine notes which I reviewed.   Past Medical History:   Diagnosis Date     Alcoholic cirrhosis of liver without ascites (H)      Alcoholic pancreatitis      Eating disorder      Hypokalemia      Hypomagnesemia      Portal hypertension (H)      Seizures (H)          No current facility-administered medications for this encounter.     Current Outpatient Medications:      folic acid (FOLVITE) 1 MG tablet, Take 1 tablet (1 mg) by mouth daily, Disp: 30 tablet, Rfl: 1     furosemide (LASIX) 20 MG tablet, Take 1 tablet (20 mg) by mouth daily, Disp: 30 tablet, Rfl: 6     lactulose (CHRONULAC) 10 GM/15ML solution, Take 30 mLs (20 g) by mouth daily, Disp: 946 mL, Rfl: 0     levETIRAcetam (KEPPRA) 500 MG tablet, TAKE 1 TABLET BY MOUTH EVERY MORNING AND 2 TABLETS BY MOUTH EVERY EVENING, Disp: 270 tablet, Rfl: 1     LORazepam (ATIVAN) 1 MG tablet, Take 1 mg by mouth every 6 hours as needed for anxiety or seizures, Disp: , Rfl:      midodrine (PROAMATINE) 10 MG tablet, Take 10 mg by mouth 3 times daily, Disp: , Rfl:      multivitamin, therapeutic (THERA-VIT) TABS tablet, Take 1 tablet by mouth daily, Disp: 90 tablet, Rfl: 1     naltrexone (DEPADE/REVIA) 50 MG tablet, Take 1 tablet (50 mg) by mouth daily, Disp: 30 tablet, Rfl: 1     omeprazole (PRILOSEC) 20 MG DR capsule, Take 1 capsule (20 mg) by mouth 2 times daily (before meals), Disp: 60 capsule, Rfl: 1     potassium chloride ER (K-TAB) 20 MEQ CR tablet, Take 2 tablets  "(40 mEq) by mouth daily for 15 days, Disp: 30 tablet, Rfl: 0     potassium chloride ER (KLOR-CON M) 20 MEQ CR tablet, Take 20 mEq by mouth daily (with breakfast), Disp: , Rfl:      spironolactone (ALDACTONE) 25 MG tablet, Take 3 tablets (75 mg) by mouth daily, Disp: 30 tablet, Rfl: 0     thiamine (B-1) 100 MG tablet, Take 1 tablet (100 mg) by mouth daily, Disp: 30 tablet, Rfl: 1     Social History:  Refer to the psychosocial assessment completed by the .  The patient lives in a home with her boyfriend and her children.  She reports maintaining good relations with her family members and denied any active legal issues.     Family History:    The patient denied a family history of mental illnesses or suicide attempts    Vital Signs:    B/P: 92/59, T: 98.3, P: 82, R: 14  Estimated body mass index is 18.43 kg/m  as calculated from the following:    Height as of 3/11/20: 1.676 m (5' 6\").    Weight as of this encounter: 51.8 kg (114 lb 3.2 oz).       Mental status examination:  Appearance:  Alert, hair was slightly disheveled, dressed in a hospital gown, laying in bed, no acute distress  Attitude:  Attempts to be cooperative  Eye Contact: Fair  Mood: \"Fine\"  Affect: Mood congruent and slightly blunted  Speech: Soft volume, slightly slow  Psychomotor Behavior:  No psychomotor abnormalities noted  Thought Process: Linear and logical; not tangential or circumstantial or disorganized  Associations:  Logical; no loose associations Noted  Thought Content:  No obvious paranoia, delusions, ideas of reference, or grandiosity noted. Denies auditory or visual hallucinations. Denies suicidal Ideations. Denies homicidal ideations.  Insight:  Fair  Judgment:  Fair  Oriented to:  time, person, and place  Attention Span and Concentration:  Intact  Recent and Remote Memory: Intact based on interviewing and details provided  Language: Appropriate based on interviewing  Fund of Knowledge: Appropriate based on " interviewing  Muscle Strength and Tone: Normal upon visual inspection         Diagnoses:    Alcohol use disorder, mild  Delirium, likely due to another medical condition (electrolyte abnormalities and/or alcohol intoxication)     Recommendations:  No acute psychiatric concerns noted or verbalized today.   She plans on abstaining for alcohol however is not interested in pursuing formal treatment.   She is currently at low acute risk for suicide.   Once she is determined to be medically stable, she may be discharged home.     Please reconsult with psychiatry as needed.   Davi Hunter MD   Text Page

## 2020-10-20 ENCOUNTER — TELEPHONE (OUTPATIENT)
Dept: GASTROENTEROLOGY | Facility: CLINIC | Age: 30
End: 2020-10-20

## 2020-10-20 DIAGNOSIS — K70.31 ALCOHOLIC CIRRHOSIS OF LIVER WITH ASCITES (H): ICD-10-CM

## 2020-10-20 DIAGNOSIS — K70.11 ALCOHOLIC HEPATITIS WITH ASCITES (H): ICD-10-CM

## 2020-10-20 RX ORDER — SPIRONOLACTONE 25 MG/1
75 TABLET ORAL DAILY
Qty: 270 TABLET | Refills: 3 | Status: SHIPPED | OUTPATIENT
Start: 2020-10-20 | End: 2021-07-19

## 2020-10-20 RX ORDER — FUROSEMIDE 20 MG
20 TABLET ORAL DAILY
Qty: 90 TABLET | Refills: 3 | Status: SHIPPED | OUTPATIENT
Start: 2020-10-20 | End: 2022-01-20

## 2020-10-22 ENCOUNTER — DOCUMENTATION ONLY (OUTPATIENT)
Dept: GASTROENTEROLOGY | Facility: CLINIC | Age: 30
End: 2020-10-22

## 2020-10-22 ENCOUNTER — VIRTUAL VISIT (OUTPATIENT)
Dept: NEUROLOGY | Facility: CLINIC | Age: 30
End: 2020-10-22
Payer: COMMERCIAL

## 2020-10-22 DIAGNOSIS — G40.109 FOCAL EPILEPSY (H): Primary | ICD-10-CM

## 2020-10-22 NOTE — PROGRESS NOTES
Per pt lab orders faxed to Jesu Payton at [phone]. She will have labs drawn prior to appointment on Monday.    Kaylee Moyer, SABAS CMA  10/22/2020 11:29 AM

## 2020-10-22 NOTE — PROGRESS NOTES
[facility]      Patient:  Kota Cowan  :  1990   Age:  30 year old   Today's Office Visit:  10/22/2020    Epilepsy Data:  The patient's seizures started at age 26.  She was in bed with her boyfriend, who witnessed uncontrollable shaking.  He called the ambulance.  At that time she was not started on medications.  Then she continued having seizures.  She currently has 3 types of seizures.      Type 1 is an aura of a weird smell, like cough drop, menthol. She feels anxious. If she sits down and takes it easy, it goes away.  They happen almost once a week.  Stress increases these.       Type 2:  She feels weird and confused.  She may say nonsense things.  It lasts a minute or until she calms down and comes out of it. They happen randomly.  Her partner saw 3 of them in the past week in 1 day.       Type 3: convulsions: the patient doesn't separate this type from type 2 and says with two of type 2 seizures, she had convulsions.  She had one last September.  She was getting ready to go to work. She does not recall anything.  No auras.  She woke up in the hospital.  She was told she had a convulsion.  No tongue bite or urinary incontinence or injuries. She had one last week, which she had the weird feeling and confusion associated with.      The triggers for her seizures are stress and alcohol.  She drinks daily, a couple of beers or wine.  She used to drink much more; she does not specify how much, just say \"a lot\".  She tried Keppra, which she believes caused weight loss.  Then she was put on lamotrigine, which she said she had allergies and \"I almost \".  She says she had a \"bad convulsion\" when she got lamotrigine.        RISK FACTORS FOR EPILEPSY:  The patient denies history of febrile seizures, meningitis, encephalitis, family history of epilepsy, known stroke or tumor.      History of Present Illness:   The patient is participating in this virtual visit for follow-up on " her seizures.  She was admitted to epilepsy monitoring unit 1/15-20/2020. She did not have any seizures, however, her EEG showed rare to occasional right temporal epileptiform discharges.     Her last seizure was about a year ago. She had only handful of seizures. She had confusional episodes, and went to the hospital 2 weeks ago and was found out her K was very low, she is taking K pills. Also her ammonia was high which has been trending chichi. She had hyperammonemia in July as well.     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  She is taking Keppra 500 -1000. She used to get very sleepy, but has got used to it. She has anxiety, but hasn't been worsen with Keppra.    Other issues: Alcohol abuse, she did quit alcohol when she found out about her abnormal liver functions, and started again during Covid, and quit again.  She says she has been sober for 3 weeks.     Current Outpatient Medications   Medication Sig Dispense Refill     folic acid (FOLVITE) 1 MG tablet Take 1 tablet (1 mg) by mouth daily 30 tablet 1     furosemide (LASIX) 20 MG tablet Take 1 tablet (20 mg) by mouth daily 90 tablet 3     lactulose (CHRONULAC) 10 GM/15ML solution Take 30 mLs (20 g) by mouth daily 946 mL 0     levETIRAcetam (KEPPRA) 500 MG tablet TAKE 1 TABLET BY MOUTH EVERY MORNING AND 2 TABLETS BY MOUTH EVERY EVENING 270 tablet 1     LORazepam (ATIVAN) 1 MG tablet Take 1 mg by mouth every 6 hours as needed for anxiety or seizures       midodrine (PROAMATINE) 10 MG tablet Take 10 mg by mouth 3 times daily       multivitamin, therapeutic (THERA-VIT) TABS tablet Take 1 tablet by mouth daily 90 tablet 1     naltrexone (DEPADE/REVIA) 50 MG tablet Take 1 tablet (50 mg) by mouth daily 30 tablet 1     omeprazole (PRILOSEC) 20 MG DR capsule Take 1 capsule (20 mg) by mouth 2 times daily (before meals) 60 capsule 1     potassium chloride ER (K-TAB) 20 MEQ CR tablet Take 2 tablets (40 mEq) by  mouth daily for 15 days 30 tablet 0     spironolactone (ALDACTONE) 25 MG tablet Take 3 tablets (75 mg) by mouth daily 270 tablet 3     thiamine (B-1) 100 MG tablet Take 1 tablet (100 mg) by mouth daily 30 tablet 1     potassium chloride ER (KLOR-CON M) 20 MEQ CR tablet Take 20 mEq by mouth daily (with breakfast)          Perceived AED Side Effects:  None    Review of Systems:    Nausea / Vomiting:  Yes  Double Vision:  No  Sleepiness:  She was when she started Keppa, but got used to it  Poor Balance:  No  Dizziness:  No  Blurred Vision:  No  Respiratory: had earache, sorethroat, cough recently, had a viral illness, Covid was negative. Improved.  Have you experienced a traumatic fall since your last visit: she fell when she was hypokalemic, and hurt her knee. Fall was not witnessed.   Are these falls related to your seizures:  unknown    Woman Care:   Patient is:    Sexually Active: Yes  Type of Birth Control: she doesn't get her periods for the past year, unknown cause.   Wants to get pregnant: No    Exam:    Wt Readings from Last 5 Encounters:   10/11/20 114 lb 3.2 oz (51.8 kg)   03/11/20 128 lb (58.1 kg)   02/05/20 160 lb 14.4 oz (73 kg)   01/20/20 158 lb 14.4 oz (72.1 kg)   06/20/19 162 lb 3.2 oz (73.6 kg)     Alert and awake, NAD, no aphasia or dysarthria, EOMI, face symmetric, moves upper extremities against gravity.  Normal finger to nose.     Assessment and Plan:  1. Focal epilepsy: Seizures are controlled on Keppra monotherapy.    2.  Alcohol abuse: she states she quit alcohol 3 weeks ago.  I encouraged her to stay sober.    - Continue Keppra 500-1000  - Obtain Keppra level  - Follow up in 6 m      As described above, I met with the patient via Amwell for 20 minutes and during this time counseling was greater than 50% of the visit time.  Renee Vázquez MD      Kota Cowan is a 30 year old female who is being evaluated via a billable video visit.      The patient has been notified of following:  "    \"This video visit will be conducted via a call between you and your physician/provider. We have found that certain health care needs can be provided without the need for an in-person physical exam.  This service lets us provide the care you need with a video conversation.  If a prescription is necessary we can send it directly to your pharmacy.  If lab work is needed we can place an order for that and you can then stop by our lab to have the test done at a later time.    Video visits are billed at different rates depending on your insurance coverage.  Please reach out to your insurance provider with any questions.    If during the course of the call the physician/provider feels a video visit is not appropriate, you will not be charged for this service.\"    Patient has given verbal consent for Video visit? Yes  How would you like to obtain your AVS? MyChart  If you are dropped from the video visit, the video invite should be resent to: Text to cell phone: [phone] or [phone]  Will anyone else be joining your video visit? No      Video-Visit Details    Type of service:  Video Visit    Video Start Time: 11:44  Video End Time: 12:04    Originating Location (pt. Location): Home    Distant Location (provider location):  [facility] EPILEPSY CARE     Platform used for Video Visit: Dereje          "

## 2020-10-22 NOTE — LETTER
"10/22/2020       RE: Kota Cowan  : 1990   MRN: [id number]      Dear Colleague,    Thank you for referring your patient, Kota Cowan, to the [facility] EPILEPSY CARE at [facility]. Please see a copy of my visit note below.    [facility] Epilepsy Care Progress Note      Patient:  Kota Cowan  :  1990   Age:  30 year old   Today's Office Visit:  10/22/2020    Epilepsy Data:  The patient's seizures started at age 26.  She was in bed with her boyfriend, who witnessed uncontrollable shaking.  He called the ambulance.  At that time she was not started on medications.  Then she continued having seizures.  She currently has 3 types of seizures.      Type 1 is an aura of a weird smell, like cough drop, menthol. She feels anxious. If she sits down and takes it easy, it goes away.  They happen almost once a week.  Stress increases these.       Type 2:  She feels weird and confused.  She may say nonsense things.  It lasts a minute or until she calms down and comes out of it. They happen randomly.  Her partner saw 3 of them in the past week in 1 day.       Type 3: convulsions: the patient doesn't separate this type from type 2 and says with two of type 2 seizures, she had convulsions.  She had one last September.  She was getting ready to go to work. She does not recall anything.  No auras.  She woke up in the hospital.  She was told she had a convulsion.  No tongue bite or urinary incontinence or injuries. She had one last week, which she had the weird feeling and confusion associated with.      The triggers for her seizures are stress and alcohol.  She drinks daily, a couple of beers or wine.  She used to drink much more; she does not specify how much, just say \"a lot\".  She tried Keppra, which she believes caused weight loss.  Then she was put on lamotrigine, which she said she had allergies and \"I almost \".  She says she had a \"bad convulsion\" when she " got lamotrigine.        RISK FACTORS FOR EPILEPSY:  The patient denies history of febrile seizures, meningitis, encephalitis, family history of epilepsy, known stroke or tumor.      History of Present Illness:   The patient is participating in this virtual visit for follow-up on her seizures.  She was admitted to epilepsy monitoring unit 1/15-20/2020. She did not have any seizures, however, her EEG showed rare to occasional right temporal epileptiform discharges.     Her last seizure was about a year ago. She had only handful of seizures. She had confusional episodes, and went to the hospital 2 weeks ago and was found out her K was very low, she is taking K pills. Also her ammonia was high which has been trending chichi. She had hyperammonemia in July as well.     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  She is taking Keppra 500 -1000. She used to get very sleepy, but has got used to it. She has anxiety, but hasn't been worsen with Keppra.    Other issues: Alcohol abuse, she did quit alcohol when she found out about her abnormal liver functions, and started again during Covid, and quit again.  She says she has been sober for 3 weeks.     Current Outpatient Medications   Medication Sig Dispense Refill     folic acid (FOLVITE) 1 MG tablet Take 1 tablet (1 mg) by mouth daily 30 tablet 1     furosemide (LASIX) 20 MG tablet Take 1 tablet (20 mg) by mouth daily 90 tablet 3     lactulose (CHRONULAC) 10 GM/15ML solution Take 30 mLs (20 g) by mouth daily 946 mL 0     levETIRAcetam (KEPPRA) 500 MG tablet TAKE 1 TABLET BY MOUTH EVERY MORNING AND 2 TABLETS BY MOUTH EVERY EVENING 270 tablet 1     LORazepam (ATIVAN) 1 MG tablet Take 1 mg by mouth every 6 hours as needed for anxiety or seizures       midodrine (PROAMATINE) 10 MG tablet Take 10 mg by mouth 3 times daily       multivitamin, therapeutic (THERA-VIT) TABS tablet Take 1 tablet by mouth daily 90 tablet 1      naltrexone (DEPADE/REVIA) 50 MG tablet Take 1 tablet (50 mg) by mouth daily 30 tablet 1     omeprazole (PRILOSEC) 20 MG DR capsule Take 1 capsule (20 mg) by mouth 2 times daily (before meals) 60 capsule 1     potassium chloride ER (K-TAB) 20 MEQ CR tablet Take 2 tablets (40 mEq) by mouth daily for 15 days 30 tablet 0     spironolactone (ALDACTONE) 25 MG tablet Take 3 tablets (75 mg) by mouth daily 270 tablet 3     thiamine (B-1) 100 MG tablet Take 1 tablet (100 mg) by mouth daily 30 tablet 1     potassium chloride ER (KLOR-CON M) 20 MEQ CR tablet Take 20 mEq by mouth daily (with breakfast)          Perceived AED Side Effects:  None    Review of Systems:    Nausea / Vomiting:  Yes  Double Vision:  No  Sleepiness:  She was when she started Keppa, but got used to it  Poor Balance:  No  Dizziness:  No  Blurred Vision:  No  Respiratory: had earache, sorethroat, cough recently, had a viral illness, Covid was negative. Improved.  Have you experienced a traumatic fall since your last visit: she fell when she was hypokalemic, and hurt her knee. Fall was not witnessed.   Are these falls related to your seizures:  unknown    Woman Care:   Patient is:    Sexually Active: Yes  Type of Birth Control: she doesn't get her periods for the past year, unknown cause.   Wants to get pregnant: No    Exam:    Wt Readings from Last 5 Encounters:   10/11/20 114 lb 3.2 oz (51.8 kg)   03/11/20 128 lb (58.1 kg)   02/05/20 160 lb 14.4 oz (73 kg)   01/20/20 158 lb 14.4 oz (72.1 kg)   06/20/19 162 lb 3.2 oz (73.6 kg)     Alert and awake, NAD, no aphasia or dysarthria, EOMI, face symmetric, moves upper extremities against gravity.  Normal finger to nose.     Assessment and Plan:  1. Focal epilepsy: Seizures are controlled on Keppra monotherapy.    2.  Alcohol abuse: she states she quit alcohol 3 weeks ago.  I encouraged her to stay sober.    - Continue Keppra 500-1000  - Obtain Keppra level  - Follow up in 6 m      As described above, I met with  "the patient via Amwell for 20 minutes and during this time counseling was greater than 50% of the visit time.  Renee Vázquez MD      Kota Cowan is a 30 year old female who is being evaluated via a billable video visit.      The patient has been notified of following:     \"This video visit will be conducted via a call between you and your physician/provider. We have found that certain health care needs can be provided without the need for an in-person physical exam.  This service lets us provide the care you need with a video conversation.  If a prescription is necessary we can send it directly to your pharmacy.  If lab work is needed we can place an order for that and you can then stop by our lab to have the test done at a later time.    Video visits are billed at different rates depending on your insurance coverage.  Please reach out to your insurance provider with any questions.    If during the course of the call the physician/provider feels a video visit is not appropriate, you will not be charged for this service.\"    Patient has given verbal consent for Video visit? Yes  How would you like to obtain your AVS? MyChart  If you are dropped from the video visit, the video invite should be resent to: Text to cell phone: [phone] or [phone]  Will anyone else be joining your video visit? No      Video-Visit Details    Type of service:  Video Visit    Video Start Time: 11:44  Video End Time: 12:04    Originating Location (pt. Location): Home    Distant Location (provider location):  [facility] EPILEPSY CARE     Platform used for Video Visit: Well              Again, thank you for allowing me to participate in the care of your patient.      Sincerely,    Renee Vázquez MD      "

## 2020-10-26 ENCOUNTER — VIRTUAL VISIT (OUTPATIENT)
Dept: GASTROENTEROLOGY | Facility: CLINIC | Age: 30
End: 2020-10-26
Attending: PHYSICIAN ASSISTANT
Payer: COMMERCIAL

## 2020-10-26 DIAGNOSIS — F10.988 ALCOHOL USE, UNSPECIFIED WITH OTHER ALCOHOL-INDUCED DISORDER (H): ICD-10-CM

## 2020-10-26 DIAGNOSIS — K70.31 ALCOHOLIC CIRRHOSIS OF LIVER WITH ASCITES (H): Primary | ICD-10-CM

## 2020-10-26 PROCEDURE — 99214 OFFICE O/P EST MOD 30 MIN: CPT | Mod: 95 | Performed by: PHYSICIAN ASSISTANT

## 2020-10-26 ASSESSMENT — PAIN SCALES - GENERAL: PAINLEVEL: NO PAIN (0)

## 2020-10-26 NOTE — LETTER
"    10/26/2020         RE: Kota Cowan  [address]  [place] [address]        Dear Colleague,    Thank you for referring your patient, Kota Cowan, to the [facility] HEPATOLOGY [facility]. Please see a copy of my visit note below.    Kota Cowan is a 30 year old female who is being evaluated via a billable video visit.      The patient has been notified of following:     \"This video visit will be conducted via a call between you and your physician/provider. We have found that certain health care needs can be provided without the need for an in-person physical exam.  This service lets us provide the care you need with a video conversation.  If a prescription is necessary we can send it directly to your pharmacy.  If lab work is needed we can place an order for that and you can then stop by our lab to have the test done at a later time.    Video visits are billed at different rates depending on your insurance coverage.  Please reach out to your insurance provider with any questions.    If during the course of the call the physician/provider feels a video visit is not appropriate, you will not be charged for this service.\"    Patient has given verbal consent for Video visit? Yes  How would you like to obtain your AVS? MyChart    Will anyone else be joining your video visit? No        Video-Visit Details    Type of service:  Video Visit    Video Start Time: 1:16 pm   Video End Time: 1:38 pm     Originating Location (pt. Location): Home    Distant Location (provider location):  [facility] HEPATOLOGY [facility]     Platform used for Video Visit:  Theresa Rahman PA-C      Hepatology Follow-up Clinic note  Kota Cowan   Date of Birth 1990  Date of Service 10/26/2020    Reason for follow-up: ETOH cirrhosis          Assessment/plan:   Kota Cowan is a 30 year old female with history of ETOH cirrhosis, complicated by history of ascites now " controlled with diuretics and mild hepatic encephalopathy and portal hypertensive gastropathy. She did relapse drinking over the past six months and last drank Oct 2020. We discussed importance of sobriety to prevent on-going decompenation of liver disease. She has mild liver dysfunction based on recent labs. She needs to continue focusing on good nutrition as she recovers. She is up to date with HCC and variceal screening.     # History of alcohol abuse:   - Continue naltrexone for cravings   - Continue absolute sobriety from alcohol   - Agree with individualized therapy to support sobriety and aid with stressors/triggers     # History of ascites:   - Continue 75 mg spironolactone  - Continue 20 mg furosemide  - Continue 40 mEq potassium chloride     # History of hepatic encephalopathy   - Continue lactulose (dose to 2-3 bowel movements a day)     # HCC Screening:   - US abdomen complete in January 2021     # Follow-up in clinic in six months or sooner as needed    Sara Rahman PA-C   [facility] Hepatology clinic    -----------------------------------------------------       HPI:   Kota Cowan is a 30 year old female presenting for follow-up.     ETOH Cirrhosis  Complicated by:  - Ascites  - HE  - No GI bleed   EGD: 3/11/2020  HCC: 7/20/20    Patient was last seen by me on 2/5/2020. She was recently hospitalized for severe hypokalemia and confusion, questionable hepatic encephalopathy and was started on lactulose. She was also started on naltrexone which has seemed to help with cravings.     Recent labs showing Tbili 1.3, ALT 22, AST 78, Albumin 2.8, INR 1.2     Over the past six months she states she was drinking on and off. Last drank about around 10/8, at which time she was drinking about a 1/2 pint of vodka.     Weight is between 100-110, was down to 85-95 when she was drinking.  She is currently taking furosemide  20 mg daily, spirolactone 75 mg daily and potassium chloride 40 mEq daily.  Last paracentesis was in January.      Currently taking lactulose 30 mL daily. Had at least six bowel movements yesterday. Denies any confusion since discharge.      Patient denies jaundice,  Patient also denies melena, hematochezia or hematemesis. Patient denies fevers, sweats or chills.    Last alcohol use was approximately Oct 8th. She states she will be seeing a psychiatrist and a therapist in the near future.     Medical hx Surgical hx   Past Medical History:   Diagnosis Date     Alcoholic cirrhosis of liver without ascites (H)      Alcoholic pancreatitis      Eating disorder      Hypokalemia      Hypomagnesemia      Portal hypertension (H)      Seizures (H)     Past Surgical History:   Procedure Laterality Date     ESOPHAGOSCOPY, GASTROSCOPY, DUODENOSCOPY (EGD), COMBINED N/A 3/11/2020    Procedure: ESOPHAGOGASTRODUODENOSCOPY (EGD);  Surgeon: Leventhal, Thomas Michael, MD;  Location: [facility] OR     ORTHOPEDIC SURGERY Right                  Medications:     Current Outpatient Medications   Medication     folic acid (FOLVITE) 1 MG tablet     furosemide (LASIX) 20 MG tablet     lactulose (CHRONULAC) 10 GM/15ML solution     levETIRAcetam (KEPPRA) 500 MG tablet     LORazepam (ATIVAN) 1 MG tablet     midodrine (PROAMATINE) 10 MG tablet     multivitamin, therapeutic (THERA-VIT) TABS tablet     naltrexone (DEPADE/REVIA) 50 MG tablet     omeprazole (PRILOSEC) 20 MG DR capsule     potassium chloride ER (K-TAB) 20 MEQ CR tablet     potassium chloride ER (KLOR-CON M) 20 MEQ CR tablet     spironolactone (ALDACTONE) 25 MG tablet     thiamine (B-1) 100 MG tablet     No current facility-administered medications for this visit.             Allergies:     Allergies   Allergen Reactions     Lamotrigine Other (See Comments)     confusion              Review of Systems:   10 points ROS was obtained and highlighted in the HPI, otherwise negative.          Physical Exam:     GENERAL: healthy, alert and no distress  EYES: Eyes grossly  normal to inspection, conjunctivae and sclerae normal  RESP: no audible wheeze, cough, or visible cyanosis.  No visible retractions or increased work of breathing.  Able to speak fully in complete sentences  NEURO: Cranial nerves grossly intact, mentation intact and speech normal  PSYCH: mentation appears normal, affect normal/bright, judgement and insight intact, normal speech and appearance well-groomed           Data:   Reviewed in person and significant for:    Lab Results   Component Value Date     10/11/2020      Lab Results   Component Value Date    POTASSIUM 3.0 10/11/2020     Lab Results   Component Value Date    CHLORIDE 114 10/11/2020     Lab Results   Component Value Date    CO2 15 10/11/2020     Lab Results   Component Value Date    BUN 3 10/11/2020     Lab Results   Component Value Date    CR 0.51 10/11/2020       Lab Results   Component Value Date    WBC 3.5 10/11/2020     Lab Results   Component Value Date    HGB 8.8 10/11/2020     Lab Results   Component Value Date    HCT 24.8 10/11/2020     Lab Results   Component Value Date     10/11/2020     Lab Results   Component Value Date     10/11/2020       Lab Results   Component Value Date     10/11/2020     Lab Results   Component Value Date    ALT 30 10/11/2020     No results found for: BILICONJ   Lab Results   Component Value Date    BILITOTAL 1.4 10/11/2020       Lab Results   Component Value Date    ALBUMIN 2.0 10/11/2020     Lab Results   Component Value Date    PROTTOTAL 5.5 10/11/2020      Lab Results   Component Value Date    ALKPHOS 147 10/11/2020       Lab Results   Component Value Date    INR 1.55 02/05/2020     US abdomen:   7/11/2020:   IMPRESSION:  1. Multiple cystic pancreatic lesions which may represent pseudocysts. The differential also includes cystic pancreatic tumors. Contrast enhanced CT may allow more full characterization.  2. No cholelithiasis.  3. No biliary ductal dilatation.  4. Fatty infiltration of  the liver.  5. Trace abdominal ascites.      Again, thank you for allowing me to participate in the care of your patient.        Sincerely,        Sara Rahman PA-C

## 2020-10-26 NOTE — PROGRESS NOTES
"Kota Cowan is a 30 year old female who is being evaluated via a billable video visit.      The patient has been notified of following:     \"This video visit will be conducted via a call between you and your physician/provider. We have found that certain health care needs can be provided without the need for an in-person physical exam.  This service lets us provide the care you need with a video conversation.  If a prescription is necessary we can send it directly to your pharmacy.  If lab work is needed we can place an order for that and you can then stop by our lab to have the test done at a later time.    Video visits are billed at different rates depending on your insurance coverage.  Please reach out to your insurance provider with any questions.    If during the course of the call the physician/provider feels a video visit is not appropriate, you will not be charged for this service.\"    Patient has given verbal consent for Video visit? Yes  How would you like to obtain your AVS? MyChart    Will anyone else be joining your video visit? No        Video-Visit Details    Type of service:  Video Visit    Video Start Time: 1:16 pm   Video End Time: 1:38 pm     Originating Location (pt. Location): Home    Distant Location (provider location):  [facility] [place]     Platform used for Video Visit:  Theresa Rahman PA-C      Hepatology Follow-up Clinic note  Kota Cowan   Date of Birth 1990  Date of Service 10/26/2020    Reason for follow-up: ETOH cirrhosis          Assessment/plan:   Kota Cowan is a 30 year old female with history of ETOH cirrhosis, complicated by history of ascites now controlled with diuretics and mild hepatic encephalopathy and portal hypertensive gastropathy. She did relapse drinking over the past six months and last drank Oct 2020. We discussed importance of sobriety to prevent on-going decompenation of liver disease. She has mild " liver dysfunction based on recent labs. She needs to continue focusing on good nutrition as she recovers. She is up to date with HCC and variceal screening.     # History of alcohol abuse:   - Continue naltrexone for cravings   - Continue absolute sobriety from alcohol   - Agree with individualized therapy to support sobriety and aid with stressors/triggers     # History of ascites:   - Continue 75 mg spironolactone  - Continue 20 mg furosemide  - Continue 40 mEq potassium chloride     # History of hepatic encephalopathy   - Continue lactulose (dose to 2-3 bowel movements a day)     # HCC Screening:   - US abdomen complete in January 2021     # Follow-up in clinic in six months or sooner as needed    Sara Rahman PA-C   [facility] Hepat[facility] clinic    -----------------------------------------------------       HPI:   Kota Cowan is a 30 year old female presenting for follow-up.     ETOH Cirrhosis  Complicated by:  - Ascites  - HE  - No GI bleed   EGD: 3/11/2020  HCC: 7/20/20    Patient was last seen by me on 2/5/2020. She was recently hospitalized for severe hypokalemia and confusion, questionable hepatic encephalopathy and was started on lactulose. She was also started on naltrexone which has seemed to help with cravings.     Recent labs showing Tbili 1.3, ALT 22, AST 78, Albumin 2.8, INR 1.2     Over the past six months she states she was drinking on and off. Last drank about around 10/8, at which time she was drinking about a 1/2 pint of vodka.     Weight is between 100-110, was down to 85-95 when she was drinking.  She is currently taking furosemide  20 mg daily, spirolactone 75 mg daily and potassium chloride 40 mEq daily. Last paracentesis was in January.      Currently taking lactulose 30 mL daily. Had at least six bowel movements yesterday. Denies any confusion since discharge.      Patient denies jaundice,  Patient also denies melena, hematochezia or hematemesis. Patient denies fevers,  sweats or chills.    Last alcohol use was approximately Oct 8th. She states she will be seeing a psychiatrist and a therapist in the near future.     Medical hx Surgical hx   Past Medical History:   Diagnosis Date     Alcoholic cirrhosis of liver without ascites (H)      Alcoholic pancreatitis      Eating disorder      Hypokalemia      Hypomagnesemia      Portal hypertension (H)      Seizures (H)     Past Surgical History:   Procedure Laterality Date     ESOPHAGOSCOPY, GASTROSCOPY, DUODENOSCOPY (EGD), COMBINED N/A 3/11/2020    Procedure: ESOPHAGOGASTRODUODENOSCOPY (EGD);  Surgeon: Leventhal, Thomas Michael, MD;  Location: [facility] OR     ORTHOPEDIC SURGERY Right                  Medications:     Current Outpatient Medications   Medication     folic acid (FOLVITE) 1 MG tablet     furosemide (LASIX) 20 MG tablet     lactulose (CHRONULAC) 10 GM/15ML solution     levETIRAcetam (KEPPRA) 500 MG tablet     LORazepam (ATIVAN) 1 MG tablet     midodrine (PROAMATINE) 10 MG tablet     multivitamin, therapeutic (THERA-VIT) TABS tablet     naltrexone (DEPADE/REVIA) 50 MG tablet     omeprazole (PRILOSEC) 20 MG DR capsule     potassium chloride ER (K-TAB) 20 MEQ CR tablet     potassium chloride ER (KLOR-CON M) 20 MEQ CR tablet     spironolactone (ALDACTONE) 25 MG tablet     thiamine (B-1) 100 MG tablet     No current facility-administered medications for this visit.             Allergies:     Allergies   Allergen Reactions     Lamotrigine Other (See Comments)     confusion              Review of Systems:   10 points ROS was obtained and highlighted in the HPI, otherwise negative.          Physical Exam:     GENERAL: healthy, alert and no distress  EYES: Eyes grossly normal to inspection, conjunctivae and sclerae normal  RESP: no audible wheeze, cough, or visible cyanosis.  No visible retractions or increased work of breathing.  Able to speak fully in complete sentences  NEURO: Cranial nerves grossly intact, mentation intact and speech  normal  PSYCH: mentation appears normal, affect normal/bright, judgement and insight intact, normal speech and appearance well-groomed           Data:   Reviewed in person and significant for:    Lab Results   Component Value Date     10/11/2020      Lab Results   Component Value Date    POTASSIUM 3.0 10/11/2020     Lab Results   Component Value Date    CHLORIDE 114 10/11/2020     Lab Results   Component Value Date    CO2 15 10/11/2020     Lab Results   Component Value Date    BUN 3 10/11/2020     Lab Results   Component Value Date    CR 0.51 10/11/2020       Lab Results   Component Value Date    WBC 3.5 10/11/2020     Lab Results   Component Value Date    HGB 8.8 10/11/2020     Lab Results   Component Value Date    HCT 24.8 10/11/2020     Lab Results   Component Value Date     10/11/2020     Lab Results   Component Value Date     10/11/2020       Lab Results   Component Value Date     10/11/2020     Lab Results   Component Value Date    ALT 30 10/11/2020     No results found for: BILICONJ   Lab Results   Component Value Date    BILITOTAL 1.4 10/11/2020       Lab Results   Component Value Date    ALBUMIN 2.0 10/11/2020     Lab Results   Component Value Date    PROTTOTAL 5.5 10/11/2020      Lab Results   Component Value Date    ALKPHOS 147 10/11/2020       Lab Results   Component Value Date    INR 1.55 02/05/2020     US abdomen:   7/11/2020:   IMPRESSION:  1. Multiple cystic pancreatic lesions which may represent pseudocysts. The differential also includes cystic pancreatic tumors. Contrast enhanced CT may allow more full characterization.  2. No cholelithiasis.  3. No biliary ductal dilatation.  4. Fatty infiltration of the liver.  5. Trace abdominal ascites.

## 2020-11-13 LAB
ALBUMIN SERPL-MCNC: 2.7 G/DL (ref 3.5–5.2)
ALP SERPL-CCNC: 83 U/L (ref 50–136)
ALT SERPL-CCNC: 19 U/L (ref 8–45)
ANION GAP SERPL CALCULATED.3IONS-SCNC: 5 MMOL/L (ref 5–18)
AST SERPL-CCNC: 62 U/L (ref 2–40)
BILIRUB SERPL-MCNC: 1.4 MG/DL (ref 0.2–1.2)
BILIRUBIN DIRECT: 0.7 MG/DL (ref 0.1–0.5)
BUN SERPL-MCNC: 4 MG/DL (ref 8–25)
CALCIUM SERPL-MCNC: 8.7 MG/DL (ref 8.5–10.5)
CHLORIDE SERPLBLD-SCNC: 111 MMOL/L (ref 98–110)
CO2 SERPL-SCNC: 17 MMOL/L (ref 21–31)
CREAT SERPL-MCNC: 0.64 MG/DL (ref 0.57–1.11)
ERYTHROCYTE [DISTWIDTH] IN BLOOD BY AUTOMATED COUNT: 13.3 % (ref 11.5–15.5)
GFR SERPL CREATININE-BSD FRML MDRD: >60 ML/MIN/1.73M2
GLUCOSE SERPL-MCNC: 86 MG/DL (ref 70–99)
HCT VFR BLD AUTO: 27.9 % (ref 33–51)
HEMOGLOBIN: 9.7 G/DL (ref 11.7–15.7)
MCH RBC QN AUTO: 37 PG (ref 26–34)
MCHC RBC AUTO-ENTMCNC: 34.8 G/DL (ref 32–36)
MCV RBC AUTO: 107 FL (ref 80–100)
PLATELET # BLD AUTO: 301 10^9/L (ref 140–440)
POTASSIUM SERPL-SCNC: 4.3 MMOL/L (ref 3.5–5)
PROT SERPL-MCNC: 6.7 G/DL (ref 6–8)
RBC # BLD AUTO: 2.62 10^12/L
SODIUM SERPL-SCNC: 133 MMOL/L (ref 135–145)
WBC # BLD AUTO: 4.8 10^9/L (ref 4.5–11)

## 2020-11-30 DIAGNOSIS — G40.109 FOCAL EPILEPSY (H): ICD-10-CM

## 2020-12-02 RX ORDER — LEVETIRACETAM 500 MG/1
TABLET ORAL
Qty: 270 TABLET | Refills: 1 | OUTPATIENT
Start: 2020-12-02

## 2020-12-03 NOTE — TELEPHONE ENCOUNTER
levETIRAcetam (KEPPRA) 500 MG tablet TAKE 1 TABLET BY MOUTH EVERY MORNING AND 2 TABLETS BY MOUTH EVERY EVENING  Last Written Prescription Date:  9/8/20  Last Fill Quantity: 270,( 90 days)   # refills: 1  Last Office Visit : 10/22/20  - Continue Keppra 500-1000  - Obtain Keppra level  - Follow up in 6 m  Future Office visit:  4/22/2021 9/8/20 levetiracetam 500 mg #270/ 1 Refill sent to [facility]      
no constipation/no diarrhea/no nausea/no vomiting

## 2020-12-27 ENCOUNTER — HEALTH MAINTENANCE LETTER (OUTPATIENT)
Age: 30
End: 2020-12-27

## 2021-01-18 DIAGNOSIS — F10.988 ALCOHOL USE, UNSPECIFIED WITH OTHER ALCOHOL-INDUCED DISORDER (H): ICD-10-CM

## 2021-01-18 RX ORDER — MULTIVITAMIN WITH FOLIC ACID 400 MCG
TABLET ORAL
Qty: 90 TABLET | Refills: 1 | Status: SHIPPED | OUTPATIENT
Start: 2021-01-18

## 2021-02-09 DIAGNOSIS — F10.988 ALCOHOL USE, UNSPECIFIED WITH OTHER ALCOHOL-INDUCED DISORDER (H): ICD-10-CM

## 2021-02-09 RX ORDER — FOLIC ACID 1 MG/1
TABLET ORAL
Qty: 90 TABLET | Refills: 0 | Status: SHIPPED | OUTPATIENT
Start: 2021-02-09 | End: 2021-05-14

## 2021-04-15 DIAGNOSIS — F10.988 ALCOHOL USE, UNSPECIFIED WITH OTHER ALCOHOL-INDUCED DISORDER (H): ICD-10-CM

## 2021-04-17 RX ORDER — MULTIVITAMIN WITH FOLIC ACID 400 MCG
TABLET ORAL
Qty: 90 TABLET | Refills: 1 | OUTPATIENT
Start: 2021-04-17

## 2021-04-22 ENCOUNTER — VIRTUAL VISIT (OUTPATIENT)
Dept: NEUROLOGY | Facility: CLINIC | Age: 31
End: 2021-04-22
Payer: COMMERCIAL

## 2021-04-22 DIAGNOSIS — G40.109 FOCAL EPILEPSY (H): Primary | ICD-10-CM

## 2021-04-22 NOTE — PROGRESS NOTES
Kota is a 31 year old who is being evaluated via a billable video visit.      How would you like to obtain your AVS? MyChart  If the video visit is dropped, the invitation should be resent by:[phone]     Will anyone else be joining your video visit? No  {If patient encounters technical issues they should call [phone] :[id number]}    Video Start Time: 11:03    Video-Visit Details    Type of service:  Video Visit    Video End Time: 11:16  Originating Location (pt. Location): Home    Distant Location (provider location):  [facility]     Platform used for Video Visit: [employer]/[facility] Progress Note      Patient:  Kota Cowan  :  1990   Age:  31 year old   Today's Office Visit:  2021    Epilepsy Data:                      History of Present Illness:     She had 1 or 2 seizures in one day witnessed by her fiancee, he is not here and she doesn't know the details. He told her she was very confused, and shaky. She is not sure if it was a GTC seizure. It happened around nuria holiday. She wasn't sleeping well, still can't sleep well.   His fiancee came home in the midst of the interview and said the only seizure he can remember was one in sleep couple weeks ago. She was sleep and she clinched the blanket and shook.     Has 2 kids and doesn't want to have another child.     Current Outpatient Medications   Medication Sig Dispense Refill     folic acid (FOLVITE) 1 MG tablet TAKE 1 TABLET BY MOUTH EVERY DAY 90 tablet 0     furosemide (LASIX) 20 MG tablet Take 1 tablet (20 mg) by mouth daily 90 tablet 3     lactulose (CHRONULAC) 10 GM/15ML solution Take 30 mLs (20 g) by mouth daily 946 mL 0     levETIRAcetam (KEPPRA) 500 MG tablet TAKE 1 TABLET BY MOUTH EVERY MORNING AND 2 TABLETS BY MOUTH EVERY EVENING 270 tablet 1     LORazepam (ATIVAN) 1 MG tablet Take 1 mg by mouth every 6 hours as needed for anxiety or seizures       midodrine (PROAMATINE) 10 MG  "tablet Take 10 mg by mouth 3 times daily       Multiple Vitamins-Minerals (TAB-A-CHHAYA) TABS TAKE 1 TABLET BY MOUTH EVERY DAY 90 tablet 1     naltrexone (DEPADE/REVIA) 50 MG tablet Take 1 tablet (50 mg) by mouth daily 30 tablet 1     omeprazole (PRILOSEC) 20 MG DR capsule Take 1 capsule (20 mg) by mouth 2 times daily (before meals) 60 capsule 1     potassium chloride ER (KLOR-CON M) 20 MEQ CR tablet Take 20 mEq by mouth daily (with breakfast)       spironolactone (ALDACTONE) 25 MG tablet Take 3 tablets (75 mg) by mouth daily 270 tablet 3     thiamine (B-1) 100 MG tablet Take 1 tablet (100 mg) by mouth daily 30 tablet 1        Perceived AED Side Effects:  {Yes/No/Uncertain:264805577}    Medication Notes:  ***      AED Medication Compliance:  {COMPLIANCE:5303::\"compliant most of the time\"}  Using a pill box:  {YES / NO:879502::\"Yes\"}    Review of Systems:  Lethargy / Tiredness:  {Yes /No is default:335455::\"No\"}  Nausea / Vomiting:  {Yes /No is default:552798::\"No\"}  Double Vision:  {Yes /No is default:147275::\"No\"}  Sleepiness:  {Yes /No is default:741601::\"No\"}  Depression:  {Yes /No is default:368244::\"No\"}  Slowed Cognitive Function:  {Yes /No is default:170903::\"No\"}  Memory Problems:  {Yes /No is default:880560::\"No\"}  Poor Balance:  {Yes /No is default:219120::\"No\"}  Dizziness:  {Yes /No is default:198941::\"No\"}  Appetite Changes:  {Yes /No is default:116536::\"No\"}  Blurred Vision:  {Yes /No is default:047029::\"No\"}  Decreased Libido:  {Yes /No is default:253266::\"No\"}  Sleep Changes:  {Yes /No is default:927226::\"No\"}  Behavioral Changes:  {Yes /No is default:791363::\"No\"}  Skin: {Skin:100::\"negative\"}  Respiratory: {Resp:400::\"No shortness of breath, dyspnea on exertion, cough, or hemoptysis\"}  Cardiovascular: {CV:500::\"negative\"}  Have you experienced a traumatic fall since your last visit: {YES COMPLICATIONS/NO:196003}  Are these falls related to your seizures:  {YES-TELMA/NO:253584}    Other Issues:  ***  Is " "patient safe to drive:  {YES / NO:233674::\"Yes\"}    Woman Care:   Patient is:    Sexually Active:  {YES/NO:930814}  Type of Birth Control:  {980040:860785}  Pregnant:  {YES (KA):965792}  Planning to become pregnant:  {YES/NO:565256}  Currently Breastfeeding:  {YES CAPS/NO SMALL:942849::\"YES\",\"No\"}    Exam:    Wt Readings from Last 5 Encounters:   10/11/20 114 lb 3.2 oz (51.8 kg)   03/11/20 128 lb (58.1 kg)   02/05/20 160 lb 14.4 oz (73 kg)   01/20/20 158 lb 14.4 oz (72.1 kg)   06/20/19 162 lb 3.2 oz (73.6 kg)       General Appearance: {NORMAL/AB/NE:417917::\"Normal\"}  Gait:  {NORMAL/AB/NE:539839::\"Normal\"}  Attention Span:  {NORMAL/AB/NE:467036::\"Normal\"}  Language:  {NORMAL/AB/NE:787996::\"Normal\"}  Extraocular Movements:  {NORMAL/AB/NE:800492::\"Normal\"}  Coordination:  {NORMAL/AB/NE:968526::\"Normal\"}  Visual Fields:  {NORMAL/AB/NE:497150::\"Normal\"}  Facial Sensation:  {NORMAL/AB/NE:041330::\"Normal\"}  Facial Strength:  {NORMAL/AB/NE:690546::\"Normal\"}  Tongue Strength:  {NORMAL/AB/NE:303766::\"Normal\"}  Limb Strength:  {NORMAL/AB/NE:763205::\"Normal\"}  Limb Tone:  {NORMAL/AB/NE:390722::\"Normal\"}  Limb Sensation:  {NORMAL/AB/NE:992849::\"Normal\"}  General Physical Findings:  {NORMAL/AB/NE:409397::\"Normal\"}    Assessment and Plan:       - Continue Keppra 500-1000  - Obtain LVT level-PCP will order along w her other labs.  - Follow up 6 m    As described above, I met with the patient for 13 minutes and during this time counseling was {GREATER THAN_LESS THAN_WITHIN:620551620} 50% of the visit time.                      "

## 2021-04-22 NOTE — LETTER
2021       RE: Kota Cowan  : 1990   MRN: [id number]      Dear Colleague,    Thank you for referring your patient, Kota Cowan, to the [facility] at [facility]. Please see a copy of my visit note below.    Kota is a 31 year old who is being evaluated via a billable video visit.      How would you like to obtain your AVS? MyChart  If the video visit is dropped, the invitation should be resent by:[phone]     Will anyone else be joining your video visit? No     Video Start Time: 11:03    Video-Visit Details    Type of service:  Video Visit  Video End Time: 11:16  Originating Location (pt. Location): Home  Distant Location (provider location):  [facility]   Platform used for Video Visit: [facility] Progress Note      Patient:  Kota Cowan  :  1990   Age:  31 year old   Today's virtual Visit:  2021    [facility] Progress Note        Patient:  Kota Cowan  :  1990   Age:  30 year old   Today's Virtual Visit:  10/22/2020     Epilepsy Data:  The patient's seizures started at age 26.  She was in bed with her boyfriend, who witnessed uncontrollable shaking.  He called the ambulance.  At that time she was not started on medications.  Then she continued having seizures.  She currently has 3 types of seizures.      Type 1 is an aura of a weird smell, like cough drop, menthol. She feels anxious. If she sits down and takes it easy, it goes away.  They happen almost once a week.  Stress increases these.       Type 2:  She feels weird and confused.  She may say nonsense things.  It lasts a minute or until she calms down and comes out of it. They happen randomly.  Her partner saw 3 of them in the past week in 1 day.       Type 3: convulsions: the patient doesn't separate this type from type 2 and says with two of type 2 seizures, she had convulsions.  " She had one last September.  She was getting ready to go to work. She does not recall anything.  No auras.  She woke up in the hospital.  She was told she had a convulsion.  No tongue bite or urinary incontinence or injuries. She had one last week, which she had the weird feeling and confusion associated with.      The triggers for her seizures are stress and alcohol.  She drinks daily, a couple of beers or wine.  She used to drink much more; she does not specify how much, just say \"a lot\".  She tried Keppra, which she believes caused weight loss.  Then she was put on lamotrigine, which she said she had allergies and \"I almost \".  She says she had a \"bad convulsion\" when she got lamotrigine.        RISK FACTORS FOR EPILEPSY:  The patient denies history of febrile seizures, meningitis, encephalitis, family history of epilepsy, known stroke or tumor.     History of Present Illness:    Kota was last seen 10/2021.  She says since last visit she had 1 or 2 seizures that happened on one day. It was witnessed by her fiancee. She is not sure if it was a GTC seizure. It happened around  holiday. She wasn't sleeping well, still can't sleep well.   His fiancee came home in the midst of the interview and said the only seizure he can remember was one in sleep couple weeks ago. She was sleep and she clinched the blanket and shook her body.     She is taking Keppra 500 -1000. She used to get very sleepy, but has got used to it. She has anxiety, but hasn't been worsen with Keppra.    Social: Alcohol abuse, she did quit alcohol when she found out about her abnormal liver functions, and started again during Covid, and did quit again.  Has 2 kids; doesn't want to have another child.     Current Outpatient Medications   Medication Sig Dispense Refill     folic acid (FOLVITE) 1 MG tablet TAKE 1 TABLET BY MOUTH EVERY DAY 90 tablet 0     furosemide (LASIX) 20 MG tablet Take 1 tablet (20 mg) by mouth daily 90 tablet 3     " lactulose (CHRONULAC) 10 GM/15ML solution Take 30 mLs (20 g) by mouth daily 946 mL 0     levETIRAcetam (KEPPRA) 500 MG tablet TAKE 1 TABLET BY MOUTH EVERY MORNING AND 2 TABLETS BY MOUTH EVERY EVENING 270 tablet 1     LORazepam (ATIVAN) 1 MG tablet Take 1 mg by mouth every 6 hours as needed for anxiety or seizures       midodrine (PROAMATINE) 10 MG tablet Take 10 mg by mouth 3 times daily       Multiple Vitamins-Minerals (TAB-A-CHHAYA) TABS TAKE 1 TABLET BY MOUTH EVERY DAY 90 tablet 1     naltrexone (DEPADE/REVIA) 50 MG tablet Take 1 tablet (50 mg) by mouth daily 30 tablet 1     omeprazole (PRILOSEC) 20 MG DR capsule Take 1 capsule (20 mg) by mouth 2 times daily (before meals) 60 capsule 1     potassium chloride ER (KLOR-CON M) 20 MEQ CR tablet Take 20 mEq by mouth daily (with breakfast)       spironolactone (ALDACTONE) 25 MG tablet Take 3 tablets (75 mg) by mouth daily 270 tablet 3     thiamine (B-1) 100 MG tablet Take 1 tablet (100 mg) by mouth daily 30 tablet 1        Perceived AED Side Effects:  No    Other Issues:      Is patient safe to drive:  No     Exam:    Wt Readings from Last 5 Encounters:   10/11/20 114 lb 3.2 oz (51.8 kg)   03/11/20 128 lb (58.1 kg)   02/05/20 160 lb 14.4 oz (73 kg)   01/20/20 158 lb 14.4 oz (72.1 kg)   06/20/19 162 lb 3.2 oz (73.6 kg)     Alert, awake, NAD, no aphasia or dysarthria, EOMI, face symmetric, moves upper extremities against gravity, normal finger to nose.     Assessment and Plan:     1. Focal epilepsy: Seizures are controlled on levetiracetam monotherapy.     2.  Alcohol abuse: she states quit drinking.  I encouraged her to stay sober.     - Continue Keppra 500-1000    - Obtain LVT level-PCP will order along w her other labs.    - Follow up 6 m        As described above, I met with the patient for 13 minutes via [facility] and during this time counseling was greater than 50% of the visit time.  Renee Vázquez MD                            Again, thank you for allowing me  to participate in the care of your patient.      Sincerely,    Renee Vázquez MD

## 2021-04-22 NOTE — PROGRESS NOTES
Kota is a 31 year old who is being evaluated via a billable video visit.      How would you like to obtain your AVS? MyChart  If the video visit is dropped, the invitation should be resent by:[phone]     Will anyone else be joining your video visit? No     Video Start Time: 11:03    Video-Visit Details    Type of service:  Video Visit  Video End Time: 11:16  Originating Location (pt. Location): Home  Distant Location (provider location):  [facility]   Platform used for Video Visit: [employer]/[facility] Progress Note      Patient:  Kota Cowan  :  1990   Age:  31 year old   Today's virtual Visit:  2021    [facility] Progress Note        Patient:  Kota Cowan  :  1990   Age:  30 year old   Today's Virtual Visit:  10/22/2020     Epilepsy Data:  The patient's seizures started at age 26.  She was in bed with her boyfriend, who witnessed uncontrollable shaking.  He called the ambulance.  At that time she was not started on medications.  Then she continued having seizures.  She currently has 3 types of seizures.      Type 1 is an aura of a weird smell, like cough drop, menthol. She feels anxious. If she sits down and takes it easy, it goes away.  They happen almost once a week.  Stress increases these.       Type 2:  She feels weird and confused.  She may say nonsense things.  It lasts a minute or until she calms down and comes out of it. They happen randomly.  Her partner saw 3 of them in the past week in 1 day.       Type 3: convulsions: the patient doesn't separate this type from type 2 and says with two of type 2 seizures, she had convulsions.  She had one last September.  She was getting ready to go to work. She does not recall anything.  No auras.  She woke up in the hospital.  She was told she had a convulsion.  No tongue bite or urinary incontinence or injuries. She had one last week, which she had the weird feeling and  "confusion associated with.      The triggers for her seizures are stress and alcohol.  She drinks daily, a couple of beers or wine.  She used to drink much more; she does not specify how much, just say \"a lot\".  She tried Keppra, which she believes caused weight loss.  Then she was put on lamotrigine, which she said she had allergies and \"I almost \".  She says she had a \"bad convulsion\" when she got lamotrigine.        RISK FACTORS FOR EPILEPSY:  The patient denies history of febrile seizures, meningitis, encephalitis, family history of epilepsy, known stroke or tumor.     History of Present Illness:    Kota was last seen 10/2021.  She says since last visit she had 1 or 2 seizures that happened on one day. It was witnessed by her fiancee. She is not sure if it was a GTC seizure. It happened around  holiday. She wasn't sleeping well, still can't sleep well.   His fiancee came home in the midst of the interview and said the only seizure he can remember was one in sleep couple weeks ago. She was sleep and she clinched the blanket and shook her body.     She is taking Keppra 500 -1000. She used to get very sleepy, but has got used to it. She has anxiety, but hasn't been worsen with Keppra.    Social: Alcohol abuse, she did quit alcohol when she found out about her abnormal liver functions, and started again during Covid, and did quit again.  Has 2 kids; doesn't want to have another child.     Current Outpatient Medications   Medication Sig Dispense Refill     folic acid (FOLVITE) 1 MG tablet TAKE 1 TABLET BY MOUTH EVERY DAY 90 tablet 0     furosemide (LASIX) 20 MG tablet Take 1 tablet (20 mg) by mouth daily 90 tablet 3     lactulose (CHRONULAC) 10 GM/15ML solution Take 30 mLs (20 g) by mouth daily 946 mL 0     levETIRAcetam (KEPPRA) 500 MG tablet TAKE 1 TABLET BY MOUTH EVERY MORNING AND 2 TABLETS BY MOUTH EVERY EVENING 270 tablet 1     LORazepam (ATIVAN) 1 MG tablet Take 1 mg by mouth every 6 hours as " needed for anxiety or seizures       midodrine (PROAMATINE) 10 MG tablet Take 10 mg by mouth 3 times daily       Multiple Vitamins-Minerals (TAB-A-CHHAYA) TABS TAKE 1 TABLET BY MOUTH EVERY DAY 90 tablet 1     naltrexone (DEPADE/REVIA) 50 MG tablet Take 1 tablet (50 mg) by mouth daily 30 tablet 1     omeprazole (PRILOSEC) 20 MG DR capsule Take 1 capsule (20 mg) by mouth 2 times daily (before meals) 60 capsule 1     potassium chloride ER (KLOR-CON M) 20 MEQ CR tablet Take 20 mEq by mouth daily (with breakfast)       spironolactone (ALDACTONE) 25 MG tablet Take 3 tablets (75 mg) by mouth daily 270 tablet 3     thiamine (B-1) 100 MG tablet Take 1 tablet (100 mg) by mouth daily 30 tablet 1        Perceived AED Side Effects:  No    Other Issues:      Is patient safe to drive:  No     Exam:    Wt Readings from Last 5 Encounters:   10/11/20 114 lb 3.2 oz (51.8 kg)   03/11/20 128 lb (58.1 kg)   02/05/20 160 lb 14.4 oz (73 kg)   01/20/20 158 lb 14.4 oz (72.1 kg)   06/20/19 162 lb 3.2 oz (73.6 kg)     Alert, awake, NAD, no aphasia or dysarthria, EOMI, face symmetric, moves upper extremities against gravity, normal finger to nose.     Assessment and Plan:     1. Focal epilepsy: Seizures are controlled on levetiracetam monotherapy.     2.  Alcohol abuse: she states quit drinking.  I encouraged her to stay sober.     - Continue Keppra 500-1000    - Obtain LVT level-PCP will order along w her other labs.    - Follow up 6 m        As described above, I met with the patient for 13 minutes via CHORD and during this time counseling was greater than 50% of the visit time.  Renee Vázquez MD

## 2021-04-24 ENCOUNTER — HEALTH MAINTENANCE LETTER (OUTPATIENT)
Age: 31
End: 2021-04-24

## 2021-05-12 DIAGNOSIS — F10.988 ALCOHOL USE, UNSPECIFIED WITH OTHER ALCOHOL-INDUCED DISORDER (H): ICD-10-CM

## 2021-05-14 RX ORDER — FOLIC ACID 1 MG/1
1 TABLET ORAL DAILY
Qty: 90 TABLET | Refills: 3 | Status: SHIPPED | OUTPATIENT
Start: 2021-05-14

## 2021-05-14 NOTE — TELEPHONE ENCOUNTER
FOLIC ACID 1MG TABLETS  Last Written Prescription Date:  2/9/2021  Last Fill Quantity: 90,   # refills: 0  Last Office Visit : 4/22/2021  Future Office visit:  None  90 Tabs, 3 Refills sent to pharm 5/14/2021      Lucía Salmon RN  Central Triage Red Flags/Med Refills

## 2021-06-07 ENCOUNTER — TELEPHONE (OUTPATIENT)
Dept: GASTROENTEROLOGY | Facility: CLINIC | Age: 31
End: 2021-06-07

## 2021-07-19 DIAGNOSIS — K70.31 ALCOHOLIC CIRRHOSIS OF LIVER WITH ASCITES (H): ICD-10-CM

## 2021-07-19 RX ORDER — SPIRONOLACTONE 25 MG/1
75 TABLET ORAL DAILY
Qty: 270 TABLET | Refills: 0 | Status: SHIPPED | OUTPATIENT
Start: 2021-07-19 | End: 2021-09-27

## 2021-09-30 ENCOUNTER — TELEPHONE (OUTPATIENT)
Dept: GASTROENTEROLOGY | Facility: CLINIC | Age: 31
End: 2021-09-30

## 2021-10-09 ENCOUNTER — HEALTH MAINTENANCE LETTER (OUTPATIENT)
Age: 31
End: 2021-10-09

## 2022-01-05 DIAGNOSIS — K70.31 ALCOHOLIC CIRRHOSIS OF LIVER WITH ASCITES (H): Primary | ICD-10-CM

## 2022-01-06 ENCOUNTER — TELEPHONE (OUTPATIENT)
Dept: GASTROENTEROLOGY | Facility: CLINIC | Age: 32
End: 2022-01-06

## 2022-01-20 ENCOUNTER — LAB (OUTPATIENT)
Dept: LAB | Facility: CLINIC | Age: 32
End: 2022-01-20
Payer: COMMERCIAL

## 2022-01-20 ENCOUNTER — ANCILLARY PROCEDURE (OUTPATIENT)
Dept: GENERAL RADIOLOGY | Facility: CLINIC | Age: 32
End: 2022-01-20
Attending: PHYSICIAN ASSISTANT
Payer: COMMERCIAL

## 2022-01-20 ENCOUNTER — PATIENT OUTREACH (OUTPATIENT)
Dept: GASTROENTEROLOGY | Facility: CLINIC | Age: 32
End: 2022-01-20

## 2022-01-20 ENCOUNTER — OFFICE VISIT (OUTPATIENT)
Dept: GASTROENTEROLOGY | Facility: CLINIC | Age: 32
End: 2022-01-20
Attending: PHYSICIAN ASSISTANT
Payer: COMMERCIAL

## 2022-01-20 VITALS
SYSTOLIC BLOOD PRESSURE: 102 MMHG | HEIGHT: 66 IN | DIASTOLIC BLOOD PRESSURE: 70 MMHG | HEART RATE: 107 BPM | OXYGEN SATURATION: 100 % | BODY MASS INDEX: 18.43 KG/M2

## 2022-01-20 DIAGNOSIS — R10.13 EPIGASTRIC PAIN: Primary | ICD-10-CM

## 2022-01-20 DIAGNOSIS — E43 SEVERE PROTEIN-CALORIE MALNUTRITION (H): ICD-10-CM

## 2022-01-20 DIAGNOSIS — K70.11 ALCOHOLIC HEPATITIS WITH ASCITES (H): ICD-10-CM

## 2022-01-20 DIAGNOSIS — K70.11 ALCOHOLIC HEPATITIS WITH ASCITES (H): Primary | ICD-10-CM

## 2022-01-20 DIAGNOSIS — K70.31 ALCOHOLIC CIRRHOSIS OF LIVER WITH ASCITES (H): ICD-10-CM

## 2022-01-20 DIAGNOSIS — R10.13 EPIGASTRIC PAIN: ICD-10-CM

## 2022-01-20 LAB
ALBUMIN SERPL-MCNC: 2 G/DL (ref 3.4–5)
ALP SERPL-CCNC: 225 U/L (ref 40–150)
ALT SERPL W P-5'-P-CCNC: 84 U/L (ref 0–50)
ANION GAP SERPL CALCULATED.3IONS-SCNC: 16 MMOL/L (ref 3–14)
AST SERPL W P-5'-P-CCNC: 155 U/L (ref 0–45)
BASOPHILS # BLD AUTO: 0 10E3/UL (ref 0–0.2)
BASOPHILS NFR BLD AUTO: 0 %
BILIRUB DIRECT SERPL-MCNC: 8.9 MG/DL (ref 0–0.2)
BILIRUB SERPL-MCNC: 11.1 MG/DL (ref 0.2–1.3)
BUN SERPL-MCNC: 7 MG/DL (ref 7–30)
CALCIUM SERPL-MCNC: 8.2 MG/DL (ref 8.5–10.1)
CHLORIDE BLD-SCNC: 109 MMOL/L (ref 94–109)
CO2 SERPL-SCNC: 11 MMOL/L (ref 20–32)
CREAT SERPL-MCNC: 0.84 MG/DL (ref 0.52–1.04)
EOSINOPHIL # BLD AUTO: 0.1 10E3/UL (ref 0–0.7)
EOSINOPHIL NFR BLD AUTO: 1 %
ERYTHROCYTE [DISTWIDTH] IN BLOOD BY AUTOMATED COUNT: 25.2 % (ref 10–15)
GFR SERPL CREATININE-BSD FRML MDRD: >90 ML/MIN/1.73M2
GLUCOSE BLD-MCNC: 222 MG/DL (ref 70–99)
HCT VFR BLD AUTO: 27.2 % (ref 35–47)
HGB BLD-MCNC: 8.9 G/DL (ref 11.7–15.7)
IMM GRANULOCYTES # BLD: 0.1 10E3/UL
IMM GRANULOCYTES NFR BLD: 1 %
INR PPP: 1.59 (ref 0.85–1.15)
LIPASE SERPL-CCNC: 41 U/L (ref 73–393)
LYMPHOCYTES # BLD AUTO: 1.2 10E3/UL (ref 0.8–5.3)
LYMPHOCYTES NFR BLD AUTO: 8 %
MCH RBC QN AUTO: 31.2 PG (ref 26.5–33)
MCHC RBC AUTO-ENTMCNC: 32.7 G/DL (ref 31.5–36.5)
MCV RBC AUTO: 95 FL (ref 78–100)
MONOCYTES # BLD AUTO: 0.8 10E3/UL (ref 0–1.3)
MONOCYTES NFR BLD AUTO: 5 %
NEUTROPHILS # BLD AUTO: 13.1 10E3/UL (ref 1.6–8.3)
NEUTROPHILS NFR BLD AUTO: 85 %
NRBC # BLD AUTO: 0 10E3/UL
NRBC BLD AUTO-RTO: 0 /100
PLATELET # BLD AUTO: 222 10E3/UL (ref 150–450)
POTASSIUM BLD-SCNC: 4.5 MMOL/L (ref 3.4–5.3)
PROT SERPL-MCNC: 4.6 G/DL (ref 6.8–8.8)
RBC # BLD AUTO: 2.85 10E6/UL (ref 3.8–5.2)
SODIUM SERPL-SCNC: 136 MMOL/L (ref 133–144)
WBC # BLD AUTO: 15.6 10E3/UL (ref 4–11)
WBC # BLD AUTO: 15.6 10E3/UL (ref 4–11)

## 2022-01-20 PROCEDURE — 83690 ASSAY OF LIPASE: CPT | Performed by: PATHOLOGY

## 2022-01-20 PROCEDURE — 85610 PROTHROMBIN TIME: CPT | Performed by: PATHOLOGY

## 2022-01-20 PROCEDURE — 80053 COMPREHEN METABOLIC PANEL: CPT | Performed by: PATHOLOGY

## 2022-01-20 PROCEDURE — 99417 PROLNG OP E/M EACH 15 MIN: CPT | Performed by: PHYSICIAN ASSISTANT

## 2022-01-20 PROCEDURE — G0463 HOSPITAL OUTPT CLINIC VISIT: HCPCS

## 2022-01-20 PROCEDURE — 99215 OFFICE O/P EST HI 40 MIN: CPT | Performed by: PHYSICIAN ASSISTANT

## 2022-01-20 PROCEDURE — 36415 COLL VENOUS BLD VENIPUNCTURE: CPT | Performed by: PATHOLOGY

## 2022-01-20 PROCEDURE — 85025 COMPLETE CBC W/AUTO DIFF WBC: CPT | Performed by: PATHOLOGY

## 2022-01-20 PROCEDURE — 82248 BILIRUBIN DIRECT: CPT | Performed by: PATHOLOGY

## 2022-01-20 PROCEDURE — 71046 X-RAY EXAM CHEST 2 VIEWS: CPT | Mod: GC | Performed by: RADIOLOGY

## 2022-01-20 RX ORDER — FUROSEMIDE 20 MG
40 TABLET ORAL DAILY
Qty: 90 TABLET | Refills: 2 | Status: SHIPPED | OUTPATIENT
Start: 2022-01-20 | End: 2022-07-29

## 2022-01-20 RX ORDER — SPIRONOLACTONE 25 MG/1
100 TABLET ORAL DAILY
Qty: 120 TABLET | Refills: 11 | Status: SHIPPED | OUTPATIENT
Start: 2022-01-20

## 2022-01-20 ASSESSMENT — PAIN SCALES - GENERAL: PAINLEVEL: SEVERE PAIN (6)

## 2022-01-20 NOTE — PROGRESS NOTES
Hepatology Follow-up Clinic note  Kota Cowan   Date of Birth 1990  Date of Service 1/20/2022    Reason for follow-up: ETOH cirrhosis          Assessment/plan:   Kota Cowan is a 31 year old female with ETOH cirrhosis, recently hospitalized for alcohol hepatitis, complicated by ascites requiring paracentesis, hepatic encephalopathy and portal hypertension. Liver function is slowly improving over the past few months, MELD-Na 22 (MELD Na 31 in December). Last drank early December 2021. Bilirubin peaked at 15, now 11. She continues to have fluid overload, will adjust diuretics slowly. WBC is trending up since recent hospitalization, no overt evidence of infection, although decreased lung lung sounds at the base. Discussed precautions for going to ER. Her hemoglobin is stable. Electrolytes are improving. She is up to date with HCC and variceal screening. Discussed the significant of her liver disease and increased risk of infection and mortality. She is very malnourished and deconditioned and discussed importance of good nutrition while she heals and gains strength.     # Hepatic Encephalopathy,   - Discussed decreasing dose of lactulose (dose to 3-5 movement     # Ascites  - Improve compliance with 2000 mg sodium   - Lasix up to 40 mg   - Spironolactone to 100 mg  - Repeat BMP in one week - fax orders (Cystin C 1.31 on 1/11)   - Paracentesis PRN - Fax to local hospital    # Severe protein calorie malnutrition (15 lbs in past year)  -  High protein diet (60-80 gm day)     # Epigastric pain, ? chronic pancreatitis.    - Decrease lactulose  - Add on lipase   - Follow up with PCP   ADDENDUM: CXR showing large amounts of stool in RUQ     # History of alcohol abuse:   - Continue absolute sobriety from alcohol   - Will need CD treatment when more medically stable     # Deconditioning:   - PT referral - fax orders     # Decreased lung sounds / ? Atelectasis   - CXR today    # Follow up in clinic in 3-4  gilda Rahman PA-C   [facility] Hepatology clinic    75 minutes spent on the date of the encounter doing chart review of outside records, review of test results/procedures, interpretation of tests, patient visit, documentation and discussion with other providers. The findings from this review are summarized in the note.     -----------------------------------------------------       HPI:   Kota Cowan is a 31 year old female presenting for follow-up.     ETOH Cirrhosis  Complicated by:  - Ascites  - HE  - History of GI bleed  EGD: 1/14/2022, mild PHG   HCC: 1/14/2022 - CT chest     Patient was last seen by me on 10/26/2020. Hospitalized in mid December for Alcohol cirrhosis decompensated with ascites and acute alcohol hepatitsi, metabolic acidosis.  Hospitalized last week  at [facility] for GI bleed/hematochezia. EGD did not show source of bleeding. Her midodrine was discontinued.     Continues to feeling very weak in her muscles. Feel very bloating, uncomfortable and pain in LUQ pain.     Currently taking Spironolactone 75 mg and Furosemide 20 mg. Naltrexone     Had first paracentesis on 1.5 with 2.5 L out.     [facility] -   Fruits, veggies, broth, snack mix, popsicles  Found lidocaine patches helpful     Three bowel movements today without lactulose. When taking three times a day, have at least 5.     Last drank over a month ago     Weight has been 105 lbs. 114 lbs currently.      States can't walk, legs are heavy. Needs help off toilet as example. Needs help getting up from sitting or standing, but  states she can walk.      Patient denies jaundice, lower extremity edema, abdominal distension or confusion.  Patient also denies melena, hematochezia or hematemesis. Patient denies weight loss, fevers, sweats or chills.    Last drank in early December. Was drinking of Wine and mixed drinks, pint vodka day. She denies any particular triggers drinking alcohol  "again. Thought she could handle drinking again.     Medical hx Surgical hx   Past Medical History:   Diagnosis Date     Alcoholic cirrhosis of liver without ascites (H)      Alcoholic pancreatitis      Eating disorder      Hypokalemia      Hypomagnesemia      Portal hypertension (H)      Seizures (H)     Past Surgical History:   Procedure Laterality Date     ESOPHAGOSCOPY, GASTROSCOPY, DUODENOSCOPY (EGD), COMBINED N/A 3/11/2020    Procedure: ESOPHAGOGASTRODUODENOSCOPY (EGD);  Surgeon: Leventhal, Thomas Michael, MD;  Location: [facility] OR     ORTHOPEDIC SURGERY Right                  Medications:     Current Outpatient Medications   Medication     folic acid (FOLVITE) 1 MG tablet     furosemide (LASIX) 20 MG tablet     lactulose (CHRONULAC) 10 GM/15ML solution     levETIRAcetam (KEPPRA) 500 MG tablet     LORazepam (ATIVAN) 1 MG tablet     midodrine (PROAMATINE) 10 MG tablet     Multiple Vitamins-Minerals (TAB-A-CHHAYA) TABS     naltrexone (DEPADE/REVIA) 50 MG tablet     omeprazole (PRILOSEC) 20 MG DR capsule     potassium chloride ER (KLOR-CON M) 20 MEQ CR tablet     spironolactone (ALDACTONE) 25 MG tablet     thiamine (B-1) 100 MG tablet     No current facility-administered medications for this visit.            Allergies:     Allergies   Allergen Reactions     Lamotrigine Other (See Comments)     confusion              Review of Systems:   10 points ROS was obtained and highlighted in the HPI, otherwise negative.          Physical Exam:   /70   Pulse 107   Ht 1.676 m (5' 6\")   SpO2 100%   BMI 18.43 kg/m      Gen: A&Ox3, appears chronically ill, underweight  HEENT: icteric sclera   CV: RRR, no overt murmurs  Lung: CTA Bilatererally, no wheezing or crackles.   Lym- no palpable lymphadenopathy  Abd: soft, NT, distended abdomen, some ascites.   Ext: moderate pitting edema > L, intact pulses.   Skin: Visible telangiectasias on chest and face=  Neuro: grossly intact, no asterixis   Psych: appropriate mood and " affects         Data:   Reviewed in person and significant for:    Lab Results   Component Value Date     11/09/2020      Lab Results   Component Value Date    POTASSIUM 4.3 11/09/2020     Lab Results   Component Value Date    CHLORIDE 111 11/09/2020     Lab Results   Component Value Date    CO2 17 11/09/2020     Lab Results   Component Value Date    BUN 4 11/09/2020     Lab Results   Component Value Date    CR 0.64 11/09/2020       Lab Results   Component Value Date    WBC 15.6 01/20/2022    WBC 4.8 11/09/2020     Lab Results   Component Value Date    HGB 8.9 01/20/2022    HGB 9.7 11/09/2020     Lab Results   Component Value Date    HCT 27.2 01/20/2022    HCT 27.9 11/09/2020     Lab Results   Component Value Date    MCV 95 01/20/2022     11/09/2020     Lab Results   Component Value Date     01/20/2022     11/09/2020       Lab Results   Component Value Date    AST 62 11/09/2020     Lab Results   Component Value Date    ALT 19 11/09/2020     No results found for: BILICONJ   Lab Results   Component Value Date    BILITOTAL 1.4 11/09/2020       Lab Results   Component Value Date    ALBUMIN 2.7 11/09/2020     Lab Results   Component Value Date    PROTTOTAL 6.7 11/09/2020      Lab Results   Component Value Date    ALKPHOS 83 11/09/2020       Lab Results   Component Value Date    INR 1.55 02/05/2020     CT ABDOMEN PELVIS WITH IV CONTRAST:   1/14/2022:  1. No definite evidence of gastrointestinal hemorrhage.   2. Cirrhotic hepatic morphology with sequelae of portal hypertension.   3. Pancreatic parenchymal atrophy with associated hypoattenuating and   hyperattenuating masslike areas. Although findings could be seen in setting of   pancreatitis and associated sequelae, underlying lesions are not excluded.   Recommend abdominal MRI for further evaluation.

## 2022-01-20 NOTE — LETTER
1/20/2022     RE: Kota Cowan  [address]  [place] [address]    Dear Colleague,    Thank you for referring your patient, Kota Cowan, to the [facility] [place]. Please see a copy of my visit note below.    Hepatology Follow-up Clinic note  Kota Cowan   Date of Birth 1990  Date of Service 1/20/2022    Reason for follow-up: ETOH cirrhosis          Assessment/plan:   Kota Cowan is a 31 year old female with ETOH cirrhosis, recently hospitalized for alcohol hepatitis, complicated by ascites requiring paracentesis, hepatic encephalopathy and portal hypertension. Liver function is slowly improving over the past few months, MELD-Na 22 (MELD Na 31 in December). Last drank early December 2021. Bilirubin peaked at 15, now 11. She continues to have fluid overload, will adjust diuretics slowly. WBC is trending up since recent hospitalization, no overt evidence of infection, although decreased lung lung sounds at the base. Discussed precautions for going to ER. Her hemoglobin is stable. Electrolytes are improving. She is up to date with HCC and variceal screening. Discussed the significant of her liver disease and increased risk of infection and mortality. She is very malnourished and deconditioned and discussed importance of good nutrition while she heals and gains strength.     # Hepatic Encephalopathy,   - Discussed decreasing dose of lactulose (dose to 3-5 movement     # Ascites  - Improve compliance with 2000 mg sodium   - Lasix up to 40 mg   - Spironolactone to 100 mg  - Repeat BMP in one week - fax orders (Cystin C 1.31 on 1/11)   - Paracentesis PRN - Fax to local hospital    # Severe protein calorie malnutrition (15 lbs in past year)  -  High protein diet (60-80 gm day)     # Epigastric pain, ? chronic pancreatitis.    - Decrease lactulose  - Add on lipase   - Follow up with PCP   ADDENDUM: CXR showing large amounts of stool in RUQ     # History of  alcohol abuse:   - Continue absolute sobriety from alcohol   - Will need CD treatment when more medically stable     # Deconditioning:   - PT referral - fax orders     # Decreased lung sounds / ? Atelectasis   - CXR today    # Follow up in clinic in 3-4 weeks    Sara Rahman PA-C   [facility] Hepatology clinic    75 minutes spent on the date of the encounter doing chart review of outside records, review of test results/procedures, interpretation of tests, patient visit, documentation and discussion with other providers. The findings from this review are summarized in the note.     -----------------------------------------------------       HPI:   Kota Cowan is a 31 year old female presenting for follow-up.     ETOH Cirrhosis  Complicated by:  - Ascites  - HE  - History of GI bleed  EGD: 1/14/2022, mild PHG   HCC: 1/14/2022 - CT chest     Patient was last seen by me on 10/26/2020. Hospitalized in mid December for Alcohol cirrhosis decompensated with ascites and acute alcohol hepatitsi, metabolic acidosis.  Hospitalized last week  at [facility] for GI bleed/hematochezia. EGD did not show source of bleeding. Her midodrine was discontinued.     Continues to feeling very weak in her muscles. Feel very bloating, uncomfortable and pain in LUQ pain.     Currently taking Spironolactone 75 mg and Furosemide 20 mg. Naltrexone     Had first paracentesis on 1.5 with 2.5 L out.     [facility] -   Fruits, veggies, broth, snack mix, popsicles  Found lidocaine patches helpful     Three bowel movements today without lactulose. When taking three times a day, have at least 5.     Last drank over a month ago     Weight has been 105 lbs. 114 lbs currently.      States can't walk, legs are heavy. Needs help off toilet as example. Needs help getting up from sitting or standing, but  states she can walk.      Patient denies jaundice, lower extremity edema, abdominal distension or  "confusion.  Patient also denies melena, hematochezia or hematemesis. Patient denies weight loss, fevers, sweats or chills.    Last drank in early December. Was drinking of Wine and mixed drinks, pint vodka day. She denies any particular triggers drinking alcohol again. Thought she could handle drinking again.     Medical hx Surgical hx   Past Medical History:   Diagnosis Date     Alcoholic cirrhosis of liver without ascites (H)      Alcoholic pancreatitis      Eating disorder      Hypokalemia      Hypomagnesemia      Portal hypertension (H)      Seizures (H)     Past Surgical History:   Procedure Laterality Date     ESOPHAGOSCOPY, GASTROSCOPY, DUODENOSCOPY (EGD), COMBINED N/A 3/11/2020    Procedure: ESOPHAGOGASTRODUODENOSCOPY (EGD);  Surgeon: Leventhal, Thomas Michael, MD;  Location: [facility] OR     ORTHOPEDIC SURGERY Right                  Medications:     Current Outpatient Medications   Medication     folic acid (FOLVITE) 1 MG tablet     furosemide (LASIX) 20 MG tablet     lactulose (CHRONULAC) 10 GM/15ML solution     levETIRAcetam (KEPPRA) 500 MG tablet     LORazepam (ATIVAN) 1 MG tablet     midodrine (PROAMATINE) 10 MG tablet     Multiple Vitamins-Minerals (TAB-A-CHHAYA) TABS     naltrexone (DEPADE/REVIA) 50 MG tablet     omeprazole (PRILOSEC) 20 MG DR capsule     potassium chloride ER (KLOR-CON M) 20 MEQ CR tablet     spironolactone (ALDACTONE) 25 MG tablet     thiamine (B-1) 100 MG tablet     No current facility-administered medications for this visit.            Allergies:     Allergies   Allergen Reactions     Lamotrigine Other (See Comments)     confusion              Review of Systems:   10 points ROS was obtained and highlighted in the HPI, otherwise negative.          Physical Exam:   /70   Pulse 107   Ht 1.676 m (5' 6\")   SpO2 100%   BMI 18.43 kg/m      Gen: A&Ox3, appears chronically ill, underweight  HEENT: icteric sclera   CV: RRR, no overt murmurs  Lung: CTA Bilatererally, no wheezing or " crackles.   Lym- no palpable lymphadenopathy  Abd: soft, NT, distended abdomen, some ascites.   Ext: moderate pitting edema > L, intact pulses.   Skin: Visible telangiectasias on chest and face=  Neuro: grossly intact, no asterixis   Psych: appropriate mood and affects         Data:   Reviewed in person and significant for:    Lab Results   Component Value Date     11/09/2020      Lab Results   Component Value Date    POTASSIUM 4.3 11/09/2020     Lab Results   Component Value Date    CHLORIDE 111 11/09/2020     Lab Results   Component Value Date    CO2 17 11/09/2020     Lab Results   Component Value Date    BUN 4 11/09/2020     Lab Results   Component Value Date    CR 0.64 11/09/2020       Lab Results   Component Value Date    WBC 15.6 01/20/2022    WBC 4.8 11/09/2020     Lab Results   Component Value Date    HGB 8.9 01/20/2022    HGB 9.7 11/09/2020     Lab Results   Component Value Date    HCT 27.2 01/20/2022    HCT 27.9 11/09/2020     Lab Results   Component Value Date    MCV 95 01/20/2022     11/09/2020     Lab Results   Component Value Date     01/20/2022     11/09/2020       Lab Results   Component Value Date    AST 62 11/09/2020     Lab Results   Component Value Date    ALT 19 11/09/2020     No results found for: BILICONJ   Lab Results   Component Value Date    BILITOTAL 1.4 11/09/2020       Lab Results   Component Value Date    ALBUMIN 2.7 11/09/2020     Lab Results   Component Value Date    PROTTOTAL 6.7 11/09/2020      Lab Results   Component Value Date    ALKPHOS 83 11/09/2020       Lab Results   Component Value Date    INR 1.55 02/05/2020     CT ABDOMEN PELVIS WITH IV CONTRAST:   1/14/2022:  1. No definite evidence of gastrointestinal hemorrhage.   2. Cirrhotic hepatic morphology with sequelae of portal hypertension.   3. Pancreatic parenchymal atrophy with associated hypoattenuating and   hyperattenuating masslike areas. Although findings could be seen in setting of    pancreatitis and associated sequelae, underlying lesions are not excluded.   Recommend abdominal MRI for further evaluation.    Again, thank you for allowing me to participate in the care of your patient.      Sincerely,    Sara Rahman PA-C

## 2022-01-20 NOTE — NURSING NOTE
"Chief Complaint   Patient presents with     RECHECK     Cirrhosis     /70   Pulse 107   Ht 1.676 m (5' 6\")   SpO2 100%   BMI 18.43 kg/m       Lissy Wang MA  "

## 2022-01-20 NOTE — PATIENT INSTRUCTIONS
Lasix up to 40 mg   Spironolactone to 100 mg  2000 mg sodium diet   Repeat labs in a week   High protein

## 2022-01-20 NOTE — PROGRESS NOTES
Per Sara Rahman PA-C, pt would benefit from enrollment in care coordination. Pt is a 31 year old female with PMH significant for ETOH cirrhosis, who was recently hospitalized with alcohol hepatitis, complicated by ascites requiring paracentesis, hepatic encephalopathy, and portal hypertension. Plan established at clinic visit per Sara:    1) Titrate lactulose to achieve 3-5 BMs- Currently taking lactulose 30 ml 3 times daily and having over 5 BMs/day  2) Increase diuretics- Pt will start taking lasix 40 mg daily and spironolactone 100 mg daily.   3) Repeat BMP in 1 week- Lab order faxed to primary care office  4) Improve compliance with 2000 mg sodium restriction   5) Paracentesis as needed- Next paracentesis to be diagnostic and therapeutic (orders faxed). Standing orders for therapeutic paracentesis also sent. Paracentesis scheduled on 1/24.   6) High protein diet (60-80 grams per day)  7) Follow up with PCP for pain management as needed  8) Stop naltrexone if taking- Pt is still taking naltrexone and verbalized understanding to stop medication  9) PT referral for deconditioning- Order faxed to [facility]   10) Chest xray for decreased lung sounds- Chest xray showed small pleural effusion and large amount of stool in RUQ. Pt is asymptomatic from pleural effusion and will continue to monitor. Pt can try Miralax for large stool burden.    11) Continue absolute sobriety from alcohol  12) Return to clinic in 3-4 weeks- Appointment on 2/10/22.     Met with pt and pt's significant other to introduce self and RN care coordinator role. Provided pt with writer's direct contact information. Pt verbalized understanding and is agreeable to plan of care.

## 2022-01-20 NOTE — LETTER
1/20/2022     RE: Kota Cowan  [address]  [facility] [address]    Hepatology Follow-up Clinic note  Kota Cowan   Date of Birth 1990  Date of Service 1/20/2022    Reason for follow-up: ETOH cirrhosis          Assessment/plan:   Kota Cowan is a 31 year old female with ETOH cirrhosis, recently hospitalized for alcohol hepatitis, complicated by ascites requiring paracentesis, hepatic encephalopathy and portal hypertension. Liver function is slowly improving over the past few months, MELD-Na 22 (MELD Na 31 in December). Last drank early December 2021. Bilirubin peaked at 15, now 11. She continues to have fluid overload, will adjust diuretics slowly. WBC is trending up since recent hospitalization, no overt evidence of infection, although decreased lung lung sounds at the base. Discussed precautions for going to ER. Her hemoglobin is stable. Electrolytes are improving. She is up to date with HCC and variceal screening. Discussed the significant of her liver disease and increased risk of infection and mortality. She is very malnourished and deconditioned and discussed importance of good nutrition while she heals and gains strength.     # Hepatic Encephalopathy,   - Discussed decreasing dose of lactulose (dose to 3-5 movement     # Ascites  - Improve compliance with 2000 mg sodium   - Lasix up to 40 mg   - Spironolactone to 100 mg  - Repeat BMP in one week - fax orders (Cystin C 1.31 on 1/11)   - Paracentesis PRN - Fax to local hospital    # Severe protein calorie malnutrition (15 lbs in past year)  -  High protein diet (60-80 gm day)     # Epigastric pain, ? chronic pancreatitis.    - Decrease lactulose  - Add on lipase   - Follow up with PCP   ADDENDUM: CXR showing large amounts of stool in RUQ     # History of alcohol abuse:   - Continue absolute sobriety from alcohol   - Will need CD treatment when more medically stable     # Deconditioning:   - PT referral - fax orders     # Decreased  lung sounds / ? Atelectasis   - CXR today    # Follow up in clinic in 3-4 weeks    Sara Rahman PA-C   [facility]    75 minutes spent on the date of the encounter doing chart review of outside records, review of test results/procedures, interpretation of tests, patient visit, documentation and discussion with other providers. The findings from this review are summarized in the note.     -----------------------------------------------------       HPI:   Kota Cowan is a 31 year old female presenting for follow-up.     ETOH Cirrhosis  Complicated by:  - Ascites  - HE  - History of GI bleed  EGD: 1/14/2022, mild PHG   HCC: 1/14/2022 - CT chest     Patient was last seen by me on 10/26/2020. Hospitalized in mid December for Alcohol cirrhosis decompensated with ascites and acute alcohol hepatitsi, metabolic acidosis.  Hospitalized last week  at [facility] for GI bleed/hematochezia. EGD did not show source of bleeding. Her midodrine was discontinued.     Continues to feeling very weak in her muscles. Feel very bloating, uncomfortable and pain in LUQ pain.     Currently taking Spironolactone 75 mg and Furosemide 20 mg. Naltrexone     Had first paracentesis on 1.5 with 2.5 L out.     [facility] -   Fruits, veggies, broth, snack mix, popsicles  Found lidocaine patches helpful     Three bowel movements today without lactulose. When taking three times a day, have at least 5.     Last drank over a month ago     Weight has been 105 lbs. 114 lbs currently.      States can't walk, legs are heavy. Needs help off toilet as example. Needs help getting up from sitting or standing, but  states she can walk.      Patient denies jaundice, lower extremity edema, abdominal distension or confusion.  Patient also denies melena, hematochezia or hematemesis. Patient denies weight loss, fevers, sweats or chills.    Last drank in early December. Was drinking of Wine and mixed  "drinks, pint vodka day. She denies any particular triggers drinking alcohol again. Thought she could handle drinking again.     Medical hx Surgical hx   Past Medical History:   Diagnosis Date     Alcoholic cirrhosis of liver without ascites (H)      Alcoholic pancreatitis      Eating disorder      Hypokalemia      Hypomagnesemia      Portal hypertension (H)      Seizures (H)     Past Surgical History:   Procedure Laterality Date     ESOPHAGOSCOPY, GASTROSCOPY, DUODENOSCOPY (EGD), COMBINED N/A 3/11/2020    Procedure: ESOPHAGOGASTRODUODENOSCOPY (EGD);  Surgeon: Leventhal, Thomas Michael, MD;  [facility]: [facility] OR     ORTHOPEDIC SURGERY Right                  Medications:     Current Outpatient Medications   Medication     folic acid (FOLVITE) 1 MG tablet     furosemide (LASIX) 20 MG tablet     lactulose (CHRONULAC) 10 GM/15ML solution     levETIRAcetam (KEPPRA) 500 MG tablet     LORazepam (ATIVAN) 1 MG tablet     midodrine (PROAMATINE) 10 MG tablet     Multiple Vitamins-Minerals (TAB-A-CHHAYA) TABS     naltrexone (DEPADE/REVIA) 50 MG tablet     omeprazole (PRILOSEC) 20 MG DR capsule     potassium chloride ER (KLOR-CON M) 20 MEQ CR tablet     spironolactone (ALDACTONE) 25 MG tablet     thiamine (B-1) 100 MG tablet     No current facility-administered medications for this visit.            Allergies:     Allergies   Allergen Reactions     Lamotrigine Other (See Comments)     confusion              Review of Systems:   10 points ROS was obtained and highlighted in the HPI, otherwise negative.          Physical Exam:   /70   Pulse 107   Ht 1.676 m (5' 6\")   SpO2 100%   BMI 18.43 kg/m      Gen: A&Ox3, appears chronically ill, underweight  HEENT: icteric sclera   CV: RRR, no overt murmurs  Lung: CTA Bilatererally, no wheezing or crackles.   Lym- no palpable lymphadenopathy  Abd: soft, NT, distended abdomen, some ascites.   Ext: moderate pitting edema > L, intact pulses.   Skin: Visible telangiectasias on chest and " face=  Neuro: grossly intact, no asterixis   Psych: appropriate mood and affects         Data:   Reviewed in person and significant for:    Lab Results   Component Value Date     11/09/2020      Lab Results   Component Value Date    POTASSIUM 4.3 11/09/2020     Lab Results   Component Value Date    CHLORIDE 111 11/09/2020     Lab Results   Component Value Date    CO2 17 11/09/2020     Lab Results   Component Value Date    BUN 4 11/09/2020     Lab Results   Component Value Date    CR 0.64 11/09/2020       Lab Results   Component Value Date    WBC 15.6 01/20/2022    WBC 4.8 11/09/2020     Lab Results   Component Value Date    HGB 8.9 01/20/2022    HGB 9.7 11/09/2020     Lab Results   Component Value Date    HCT 27.2 01/20/2022    HCT 27.9 11/09/2020     Lab Results   Component Value Date    MCV 95 01/20/2022     11/09/2020     Lab Results   Component Value Date     01/20/2022     11/09/2020       Lab Results   Component Value Date    AST 62 11/09/2020     Lab Results   Component Value Date    ALT 19 11/09/2020     No results found for: BILICONJ   Lab Results   Component Value Date    BILITOTAL 1.4 11/09/2020       Lab Results   Component Value Date    ALBUMIN 2.7 11/09/2020     Lab Results   Component Value Date    PROTTOTAL 6.7 11/09/2020      Lab Results   Component Value Date    ALKPHOS 83 11/09/2020       Lab Results   Component Value Date    INR 1.55 02/05/2020     CT ABDOMEN PELVIS WITH IV CONTRAST:   1/14/2022:  1. No definite evidence of gastrointestinal hemorrhage.   2. Cirrhotic hepatic morphology with sequelae of portal hypertension.   3. Pancreatic parenchymal atrophy with associated hypoattenuating and   hyperattenuating masslike areas. Although findings could be seen in setting of   pancreatitis and associated sequelae, underlying lesions are not excluded.   Recommend abdominal MRI for further evaluation.      Sara Rahman PA-C

## 2022-01-25 NOTE — PROGRESS NOTES
Per chart review, pt is currently admitted at OSH with hepatic encephalopathy and TERRY. Will continue to follow plan of care and reach out to pt once discharged.

## 2022-02-10 ENCOUNTER — VIRTUAL VISIT (OUTPATIENT)
Dept: GASTROENTEROLOGY | Facility: CLINIC | Age: 32
End: 2022-02-10
Attending: PHYSICIAN ASSISTANT
Payer: COMMERCIAL

## 2022-02-10 DIAGNOSIS — Z53.9 ERRONEOUS ENCOUNTER--DISREGARD: Primary | ICD-10-CM

## 2022-02-10 PROCEDURE — 10000001 PR ERRONEOUS ENCOUNTER--DISREGARD: Performed by: PHYSICIAN ASSISTANT

## 2022-02-10 ASSESSMENT — PAIN SCALES - GENERAL: PAINLEVEL: NO PAIN (0)

## 2022-02-10 NOTE — PROGRESS NOTES
Reschedule appointment early next week. Patient being discharged from hospital later this afternoon.     This encounter was opened in error. Please disregard.

## 2022-02-10 NOTE — LETTER
2/10/2022     RE: Kota Cowan  [address]  [place] [address]    Dear Colleague,    Thank you for referring your patient, Kota Cowan, to the [facility] [place]. Please see a copy of my visit note below.    Reschedule appointment early next week. Patient being discharged from hospital later this afternoon.     This encounter was opened in error. Please disregard.      Again, thank you for allowing me to participate in the care of your patient.        Sincerely,        Sara Rahman PA-C

## 2022-02-11 ENCOUNTER — PATIENT OUTREACH (OUTPATIENT)
Dept: GASTROENTEROLOGY | Facility: CLINIC | Age: 32
End: 2022-02-11
Payer: COMMERCIAL

## 2022-02-11 DIAGNOSIS — K70.31 ALCOHOLIC CIRRHOSIS OF LIVER WITH ASCITES (H): Primary | ICD-10-CM

## 2022-02-11 NOTE — PROGRESS NOTES
Hepatology post hospital discharge RNCC assessment    Post hospital discharge follow up:      1. Admission diagnosis:  Altered mental status   2. Discharge diagnosis:  Respiratory Failure With Hypoxia, seizure disorder, alcoholic cirrhosis, and encephalopathy metabolic  3. Admitted on: 1/23/2022  4. Discharged on: 2/10/2022     Medication Review    1. Medication review completed.    2. Discharge medication changes reviewed.   3. Patient did have new medications prescribed in hospital.  - Prednisone 20 mg tablet, Take 1 tablet (20 mg) by mouth daily x 11 days (Stop date: 2/16/2022)   - Bactrim 200-40 mg/5 ml suspension, Take 35 ml (280 mg) 2 times daily (Stop date: 2/16/2022)  - Bactrim 400-80 mg tablet, Take 1 tablet by mouth daily starting 2/17/2022  -Ciprofloxacin 500 mg tablet, Take 1 tablet by mouth daily starting 2/17/2022    Follow Up Post Discharge    1. Reviewed discharge instructions with patient. Follow up appointments reviewed and transportation to appointments confirmed.   2. Patient able to verbalized understanding of discharge instructions including medications and follow up.      3. Care coordinator role and contact information reviewed, and pt encouraged to call with questions or concerns.     Symptom Review    1.  Ascites:  Pt has paracentesis scheduled on 2/15. Pt discharged on furosemide 40 mg daily and spironolactone 100 mg daily.  2.  Edema:  Pt reported lower extremity edema. Reviewed the importance of pt taking diuretics as prescribed, adhering to 2000 mg NA diet, and elevating legs.   3. Encephalopathy:  Pt denied experiencing confusion. Pt discharged on lactulose 30 ml 3 times daily and instructed to titrate to achieve 3-4 BMs/day. Pt stated that she has had 1 BM so far today and has not taken any lactulose. Emphasized the importance of compliance with lactulose and titrating to achieve goal number of BMs/day.   4.  Deconditioning:  Pt discharged with home care for RN, PT, and OT. Pt stated  that home care came today, 2/11, for initial intake assessment. Pt is ambulating with assistance and reported feeling weak. Pt was prescribed a walker but has not picked this up yet.   5. Compliance: Pt stated that she has not taken any of her medications today. Pt stated that her s/o, Lance, did not know what medications to give the pt and this is why she has not taken any medications yet. Reviewed that pt's discharge paperwork should detail what medications pt was discharged on and writer offered to review this with Lance. Per pt, Lance was not home but writer could try calling him (message left on Lance's voicemail). Reiterated that pt has to take all medications as prescribed.     Collaboration    Above discussed with Sara.  Orders received.      Plan    1. Pt has PCP appointment and labs on 2/14.  2. Appointment with Sara Rahman PA-C on 2/15.  3. Will check in with pt on 2/14.      Patient was given an opportunity to ask questions and have those questions answered to her satisfaction.  Patient verbalized understanding of instructions provided and agreed to plan of care.

## 2022-02-14 NOTE — PROGRESS NOTES
Pt had appointment with PCP today, 2/14, and labs drawn. Per Care Everywhere, pt's creatinine increased to 2.21.     Called pt for check in. Pt reported compliance with medications and that significant other,Lance, figured out medications that pt should be taking from discharge paperwork. Pt has a pill box that Lance is setting up pt's medications in. Pt is taking lactulose 2-3 times daily and having 1-2 BMs/day. Denied experiencing confusion. Educated pt to titrate lactulose to achieve 3-5 BMs/day.     Pt is still having moderate lower extremity edema and abdominal distension. Pt has paracentesis on 2/15 and will receive albumin per orders. Pt is taking lasix 40 mg daily and spironolactone 100 mg daily. Encouraged pt to elevate lower extremities and adhere to 2,000 mg Na diet.     Pt reported daily nose bleeds that can take up to an hour to resolve. Denied feeling lightheaded or dizzy during bleeding episodes. Instructed pt to use saline nasal spray and vaseline to keep nasal bed moist. Advised pt to present to the ER if bleeding does not subside with pressure after an hour.     Reviewed pt's labs and symptoms with Sara Rahman. Pt has an appointment with Sara on 2/15. Per Sara, no changes to plan of care at this time.

## 2022-02-15 ENCOUNTER — VIRTUAL VISIT (OUTPATIENT)
Dept: GASTROENTEROLOGY | Facility: CLINIC | Age: 32
End: 2022-02-15
Attending: PHYSICIAN ASSISTANT
Payer: COMMERCIAL

## 2022-02-15 DIAGNOSIS — R53.81 PHYSICAL DECONDITIONING: ICD-10-CM

## 2022-02-15 DIAGNOSIS — E43 SEVERE PROTEIN-CALORIE MALNUTRITION (H): ICD-10-CM

## 2022-02-15 DIAGNOSIS — K76.82 HEPATIC ENCEPHALOPATHY (H): ICD-10-CM

## 2022-02-15 DIAGNOSIS — K70.31 ALCOHOLIC CIRRHOSIS OF LIVER WITH ASCITES (H): Primary | ICD-10-CM

## 2022-02-15 PROCEDURE — 99442 PR PHYSICIAN TELEPHONE EVALUATION 11-20 MIN: CPT | Mod: 95 | Performed by: PHYSICIAN ASSISTANT

## 2022-02-15 RX ORDER — PREDNISONE 20 MG/1
20 TABLET ORAL DAILY
COMMUNITY
Start: 2022-02-10 | End: 2022-02-17

## 2022-02-15 RX ORDER — SULFAMETHOXAZOLE AND TRIMETHOPRIM 200; 40 MG/5ML; MG/5ML
35 SUSPENSION ORAL 2 TIMES DAILY
COMMUNITY
Start: 2022-02-11

## 2022-02-15 RX ORDER — CHOLECALCIFEROL (VITAMIN D3) 125 MCG
1 CAPSULE ORAL
COMMUNITY
Start: 2022-02-11

## 2022-02-15 RX ORDER — RIFAXIMIN 550 MG/1
550 TABLET ORAL 2 TIMES DAILY
COMMUNITY
Start: 2022-02-11

## 2022-02-15 ASSESSMENT — PAIN SCALES - GENERAL: PAINLEVEL: NO PAIN (0)

## 2022-02-15 NOTE — PROGRESS NOTES
Kota is a 31 year old who is being evaluated via a billable telephone    How would you like to obtain your AVS? MyChart  If the video visit is dropped, the invitation should be resent by: Text to cell phone: [phone]  Will anyone else be joining your video visit? No    Phone: switched phone call due to poor sound quality. 15 minutes.     Originating Location (pt. Location): Clinic    Distant Location (provider location):  [facility] [place]     Hepatology Follow-up Clinic note  Kota Cowan   Date of Birth 1990  Date of Service 2/15/2022    Reason for follow-up: ETOH cirrhosis,          Assessment/plan:   Kota Cowan is a 31 year old female with ETOH cirrhosis, resolving Alcohol hepatitis, complicated by history of ascites requiring paracentesis, hepatic encephalopathy with fair control and portal hypertensive gastropathy. Recently discharged from prolonged hospitalization with hepatic encephalopathy and found to have pneumocystic pneumonia. Outside labs: (Yesterday: Tbili 4.2, Sodium 128, Cr 2.21, on 2/8 INR 1.8). We discussed holding her diuretics due to renal function and repeating labs later this week. She is up to date with HCC and variceal screening.     # TERRY, Cr trending up to 2.21.   - Stop Lasix up to 40 mg and Spironolactone to 100 mg  - Repeat BMP on Thursday/Friday this week     # Ascites  - Improve compliance with 2000 mg sodium   - Paracentesis PRN w/ albumin replacement  - Fax to local hospital     # Severe protein calorie malnutrition:   -  High protein diet (60-80 gm day)     # Hepatic encephalopathy:   - Continue lactulose (dose to 3-5 bowel movements a day)  - Continue Rifaximin 550 mg twice daily     # History of alcohol abuse:   - Continue absolute sobriety from alcohol   - Will need CD treatment when more medically stable      # Deconditioning:   - Has home PT/OT    # RN Care Coordinator to reach out with follow up recommendations with  patient and  tomorrow     # Follow-up in clinic in one month if she continues. Strongly recommended choosing primary Hepatology ([facility] or [facility]) to ensure continuity of care.     Sara Rahman PA-C   [facility]    -----------------------------------------------------       HPI:   Kota Cowan is a 31 year old female presenting for follow-up.        ETOH Cirrhosis  Complicated by:  - Ascites  - HE  - History of GI bleed  EGD: 1/14/2022, mild PHG   HCC: 1/23/2022 - CT chest     Patient was last seen by me on 2/10/2022. She was hospitalized 1/23-2/10, was found to have respiroatory failure with hypoxia and found to have pneumocystitis pneumonia, discharged prednisone taper. She ws also started on midodrine.     Outside labs from yesterday: showing Sodium 128, Cr 2.21 from (1.19 on 2/10), Tbili down to 4.2, albumin 3.4, hemoglobin 8.7     She has been requiring paracentesis weekly, had- 5200 L out today.  Swelling in legs still there.     For diuretics, she thinks she is taking: Furosemide - 3 pills - 60 mg and Spironolactone 100 mg. She is also on a fluid restriction, but she is not sure if 1.5 or 2L. Per outside records, states 1500 mL restriction.      No confusion since discharge. Having 3 a day.     Appetite is good since getting home. Following low sodium.     Patient also denies melena, hematochezia or hematemesis.  Patient denies weight loss, fevers, sweats or chills.    Medical hx Surgical hx   Past Medical History:   Diagnosis Date     Alcoholic cirrhosis of liver without ascites (H)      Alcoholic pancreatitis      Eating disorder      Hypokalemia      Hypomagnesemia      Portal hypertension (H)      Seizures (H)     Past Surgical History:   Procedure Laterality Date     ESOPHAGOSCOPY, GASTROSCOPY, DUODENOSCOPY (EGD), COMBINED N/A 3/11/2020    Procedure: ESOPHAGOGASTRODUODENOSCOPY (EGD);  Surgeon: Leventhal, Thomas Michael, MD;  Location: [facility] OR     ORTHOPEDIC  SURGERY Right                  Medications:     Current Outpatient Medications   Medication     folic acid (FOLVITE) 1 MG tablet     furosemide (LASIX) 20 MG tablet     lactulose (CHRONULAC) 10 GM/15ML solution     levETIRAcetam (KEPPRA) 500 MG tablet     LORazepam (ATIVAN) 1 MG tablet     midodrine (PROAMATINE) 10 MG tablet     Multiple Vitamins-Minerals (TAB-A-CHHAYA) TABS     omeprazole (PRILOSEC) 20 MG DR capsule     potassium chloride ER (KLOR-CON M) 20 MEQ CR tablet     spironolactone (ALDACTONE) 25 MG tablet     thiamine (B-1) 100 MG tablet     [START ON 2/17/2022] vitamin D3 (CHOLECALCIFEROL) 1.25 MG (45846 UT) capsule     No current facility-administered medications for this visit.            Allergies:     Allergies   Allergen Reactions     Lamotrigine Other (See Comments)     confusion              Review of Systems:   10 points ROS was obtained and highlighted in the HPI, otherwise negative.          Physical Exam:     GENERAL: alert, appears chronically ill, jaundiced  EYES: Eyes grossly normal to inspection, conjunctivae and sclerae normal  RESP: no audible wheeze, cough, or visible cyanosis.  No visible retractions or increased work of breathing.  Able to speak fully in complete sentences  NEURO: Cranial nerves grossly intact, mentation intact and speech normal  PSYCH: mentation appears normal, judgement and insight intact, slow speech, flat affect         Data:   Reviewed in person and significant for:    Lab Results   Component Value Date     01/20/2022     11/09/2020      Lab Results   Component Value Date    POTASSIUM 4.5 01/20/2022    POTASSIUM 4.3 11/09/2020     Lab Results   Component Value Date    CHLORIDE 109 01/20/2022    CHLORIDE 111 11/09/2020     Lab Results   Component Value Date    CO2 11 01/20/2022    CO2 17 11/09/2020     Lab Results   Component Value Date    BUN 7 01/20/2022    BUN 4 11/09/2020     Lab Results   Component Value Date    CR 0.84 01/20/2022    CR 0.64  11/09/2020       Lab Results   Component Value Date    WBC 15.6 01/20/2022    WBC 15.6 01/20/2022    WBC 4.8 11/09/2020     Lab Results   Component Value Date    HGB 8.9 01/20/2022    HGB 9.7 11/09/2020     Lab Results   Component Value Date    HCT 27.2 01/20/2022    HCT 27.9 11/09/2020     Lab Results   Component Value Date    MCV 95 01/20/2022     11/09/2020     Lab Results   Component Value Date     01/20/2022     11/09/2020       Lab Results   Component Value Date     01/20/2022    AST 62 11/09/2020     Lab Results   Component Value Date    ALT 84 01/20/2022    ALT 19 11/09/2020     No results found for: BILICONJ   Lab Results   Component Value Date    BILITOTAL 11.1 01/20/2022    BILITOTAL 1.4 11/09/2020       Lab Results   Component Value Date    ALBUMIN 2.0 01/20/2022    ALBUMIN 2.7 11/09/2020     Lab Results   Component Value Date    PROTTOTAL 4.6 01/20/2022    PROTTOTAL 6.7 11/09/2020      Lab Results   Component Value Date    ALKPHOS 225 01/20/2022    ALKPHOS 83 11/09/2020       Lab Results   Component Value Date    INR 1.59 01/20/2022    INR 1.55 02/05/2020     1. Chronic hepatic parenchymal disease. No suspicious hepatic mass identified   sonographically.     2. Patent hepatic arteries with antegrade flow and elevated peak systolic   velocities in the main hepatic artery measuring up to 234 cm/s, favored to be   secondary to technique/focal tortuosity of the vessel versus less likely mild   stenosis.     3. Patent portal and hepatic veins with no evidence of thrombus.     4. Moderate ascites, examination performed prior to same day paracentesis.

## 2022-02-15 NOTE — LETTER
2/15/2022         RE: Kota Cowan  [address]  [facility] [address]        Dear Colleague,    Thank you for referring your patient, Kota Cowan, to the [facility] [place]. Please see a copy of my visit note below.    Kota is a 31 year old who is being evaluated via a billable telephone    How would you like to obtain your AVS? MyChart  If the video visit is dropped, the invitation should be resent by: Text to cell phone: [phone]  Will anyone else be joining your video visit? No    Phone: switched phone call due to poor sound quality. 15 minutes.     Originating Location (pt. Location): Clinic    Distant Location (provider location):  [facility] [place]     Hepatology Follow-up Clinic note  Kota Cowan   Date of Birth 1990  Date of Service 2/15/2022    Reason for follow-up: ETOH cirrhosis,          Assessment/plan:   Kota Cowan is a 31 year old female with ETOH cirrhosis, resolving Alcohol hepatitis, complicated by history of ascites requiring paracentesis, hepatic encephalopathy with fair control and portal hypertensive gastropathy. Recently discharged from prolonged hospitalization with hepatic encephalopathy and found to have pneumocystic pneumonia. Outside labs: (Yesterday: Tbili 4.2, Sodium 128, Cr 2.21, on 2/8 INR 1.8). We discussed holding her diuretics due to renal function and repeating labs later this week. She is up to date with HCC and variceal screening.     # TERRY, Cr trending up to 2.21.   - Stop Lasix up to 40 mg and Spironolactone to 100 mg  - Repeat BMP on Thursday/Friday this week     # Ascites  - Improve compliance with 2000 mg sodium   - Paracentesis PRN w/ albumin replacement  - Fax to local hospital     # Severe protein calorie malnutrition:   -  High protein diet (60-80 gm day)     # Hepatic encephalopathy:   - Continue lactulose (dose to 3-5 bowel movements a day)  - Continue Rifaximin  550 mg twice daily     # History of alcohol abuse:   - Continue absolute sobriety from alcohol   - Will need CD treatment when more medically stable      # Deconditioning:   - Has home PT/OT    # RN Care Coordinator to reach out with follow up recommendations with patient and  tomorrow     # Follow-up in clinic in one month if she continues. Strongly recommended choosing primary Hepatology ([facility] or [facility]) to ensure continuity of care.     Sara Rahman PA-C   [facility]    -----------------------------------------------------       HPI:   Kota Cowan is a 31 year old female presenting for follow-up.        ETOH Cirrhosis  Complicated by:  - Ascites  - HE  - History of GI bleed  EGD: 1/14/2022, mild PHG   HCC: 1/23/2022 - CT chest     Patient was last seen by me on 2/10/2022. She was hospitalized 1/23-2/10, was found to have respiroatory failure with hypoxia and found to have pneumocystitis pneumonia, discharged prednisone taper. She ws also started on midodrine.     Outside labs from yesterday: showing Sodium 128, Cr 2.21 from (1.19 on 2/10), Tbili down to 4.2, albumin 3.4, hemoglobin 8.7     She has been requiring paracentesis weekly, had- 5200 L out today.  Swelling in legs still there.     For diuretics, she thinks she is taking: Furosemide - 3 pills - 60 mg and Spironolactone 100 mg. She is also on a fluid restriction, but she is not sure if 1.5 or 2L. Per outside records, states 1500 mL restriction.      No confusion since discharge. Having 3 a day.     Appetite is good since getting home. Following low sodium.     Patient also denies melena, hematochezia or hematemesis.  Patient denies weight loss, fevers, sweats or chills.    Medical hx Surgical hx   Past Medical History:   Diagnosis Date     Alcoholic cirrhosis of liver without ascites (H)      Alcoholic pancreatitis      Eating disorder      Hypokalemia      Hypomagnesemia      Portal hypertension (H)       Seizures (H)     Past Surgical History:   Procedure Laterality Date     ESOPHAGOSCOPY, GASTROSCOPY, DUODENOSCOPY (EGD), COMBINED N/A 3/11/2020    Procedure: ESOPHAGOGASTRODUODENOSCOPY (EGD);  Surgeon: Leventhal, Thomas Michael, MD;  Location:  OR     ORTHOPEDIC SURGERY Right                  Medications:     Current Outpatient Medications   Medication     folic acid (FOLVITE) 1 MG tablet     furosemide (LASIX) 20 MG tablet     lactulose (CHRONULAC) 10 GM/15ML solution     levETIRAcetam (KEPPRA) 500 MG tablet     LORazepam (ATIVAN) 1 MG tablet     midodrine (PROAMATINE) 10 MG tablet     Multiple Vitamins-Minerals (TAB-A-CHHAYA) TABS     omeprazole (PRILOSEC) 20 MG DR capsule     potassium chloride ER (KLOR-CON M) 20 MEQ CR tablet     spironolactone (ALDACTONE) 25 MG tablet     thiamine (B-1) 100 MG tablet     [START ON 2/17/2022] vitamin D3 (CHOLECALCIFEROL) 1.25 MG (48633 UT) capsule     No current facility-administered medications for this visit.            Allergies:     Allergies   Allergen Reactions     Lamotrigine Other (See Comments)     confusion              Review of Systems:   10 points ROS was obtained and highlighted in the HPI, otherwise negative.          Physical Exam:     GENERAL: alert, appears chronically ill, jaundiced  EYES: Eyes grossly normal to inspection, conjunctivae and sclerae normal  RESP: no audible wheeze, cough, or visible cyanosis.  No visible retractions or increased work of breathing.  Able to speak fully in complete sentences  NEURO: Cranial nerves grossly intact, mentation intact and speech normal  PSYCH: mentation appears normal, judgement and insight intact, slow speech, flat affect         Data:   Reviewed in person and significant for:    Lab Results   Component Value Date     01/20/2022     11/09/2020      Lab Results   Component Value Date    POTASSIUM 4.5 01/20/2022    POTASSIUM 4.3 11/09/2020     Lab Results   Component Value Date    CHLORIDE 109 01/20/2022     CHLORIDE 111 11/09/2020     Lab Results   Component Value Date    CO2 11 01/20/2022    CO2 17 11/09/2020     Lab Results   Component Value Date    BUN 7 01/20/2022    BUN 4 11/09/2020     Lab Results   Component Value Date    CR 0.84 01/20/2022    CR 0.64 11/09/2020       Lab Results   Component Value Date    WBC 15.6 01/20/2022    WBC 15.6 01/20/2022    WBC 4.8 11/09/2020     Lab Results   Component Value Date    HGB 8.9 01/20/2022    HGB 9.7 11/09/2020     Lab Results   Component Value Date    HCT 27.2 01/20/2022    HCT 27.9 11/09/2020     Lab Results   Component Value Date    MCV 95 01/20/2022     11/09/2020     Lab Results   Component Value Date     01/20/2022     11/09/2020       Lab Results   Component Value Date     01/20/2022    AST 62 11/09/2020     Lab Results   Component Value Date    ALT 84 01/20/2022    ALT 19 11/09/2020     No results found for: BILICONJ   Lab Results   Component Value Date    BILITOTAL 11.1 01/20/2022    BILITOTAL 1.4 11/09/2020       Lab Results   Component Value Date    ALBUMIN 2.0 01/20/2022    ALBUMIN 2.7 11/09/2020     Lab Results   Component Value Date    PROTTOTAL 4.6 01/20/2022    PROTTOTAL 6.7 11/09/2020      Lab Results   Component Value Date    ALKPHOS 225 01/20/2022    ALKPHOS 83 11/09/2020       Lab Results   Component Value Date    INR 1.59 01/20/2022    INR 1.55 02/05/2020     1. Chronic hepatic parenchymal disease. No suspicious hepatic mass identified   sonographically.     2. Patent hepatic arteries with antegrade flow and elevated peak systolic   velocities in the main hepatic artery measuring up to 234 cm/s, favored to be   secondary to technique/focal tortuosity of the vessel versus less likely mild   stenosis.     3. Patent portal and hepatic veins with no evidence of thrombus.     4. Moderate ascites, examination performed prior to same day paracentesis.      Again, thank you for allowing me to participate in the care of your patient.         Sincerely,        Sara Rahman PA-C

## 2022-02-16 ENCOUNTER — PATIENT OUTREACH (OUTPATIENT)
Dept: GASTROENTEROLOGY | Facility: CLINIC | Age: 32
End: 2022-02-16
Payer: COMMERCIAL

## 2022-02-16 NOTE — PROGRESS NOTES
Pt had hepatology appointment with Sara Rahman on 2/15. Plan per Sara:    1) Hold furosemide and spironolactone   2) Repeat lab on 2/17 or 2/18- Orders for BMP, CBC, INR, and hepatic panel faxed to pt's PCP  3) Continue weekly therapeutic paracentesis as needed- Pt had paracentesis on 2/17 and had 5200 ml of fluid removed.   4) Continue lactulose and titrate to achieve 3-5 BMs/day  5) 10-12 cups of fluid per day    Called pt to review plan of care. Pt's s/o sets up pill box and was not a part of hepatology appointment on 2/15. Pt forgot to tell Lance about holding furosemide and spironolactone and stated that she took these medications today, 2/16. Reiterated the importance of pt stopping diuretics and that pt needs to communicate these changes with Lance if he sets up pt's medications. Pt stated that she would discuss this with Lance right away. Educated pt that she will likely accumulate more fluid with stopping diuretics and needs to proactively schedule weekly paracentesis.     Notified pt that orders for labs were sent to primary care clinic and pt should get repeat labs done within 1-2 days. Reviewed the importance of lactulose titration and pt staying hydrated.     Will check in with pt on 2/21. Pt verbalized understanding and is agreeable to plan of care.

## 2022-02-18 NOTE — PROGRESS NOTES
Attempted to reach patient for check in and to confirm pt stopped furosemide and spironolactone, no answer, message left requesting call back, number provided.

## 2022-02-21 NOTE — PROGRESS NOTES
Per chart review, pt has not had labs drawn since 2/14. Second attempt made to reach patient for check in, no answer and message left.

## 2022-03-22 ENCOUNTER — TELEPHONE (OUTPATIENT)
Dept: GASTROENTEROLOGY | Facility: CLINIC | Age: 32
End: 2022-03-22
Payer: COMMERCIAL

## 2022-03-22 ENCOUNTER — PATIENT OUTREACH (OUTPATIENT)
Dept: GASTROENTEROLOGY | Facility: CLINIC | Age: 32
End: 2022-03-22
Payer: COMMERCIAL

## 2022-03-22 NOTE — TELEPHONE ENCOUNTER
[facility] Call Center    Phone Message    May a detailed message be left on voicemail: yes     Reason for Call: Other:      Iva, RN from [address] in [place] is requesting a call back to discuss pt's Paracentisis order.    She stated that the current orders ask for 5 liters, but the pt is asking for more to be removed.    She is available today until 2 pm, and tomorrow 7am-3pm.    Iva: [phone]    Action Taken: Message routed to:  [facility] Hep    Travel Screening: Not Applicable

## 2022-03-22 NOTE — PROGRESS NOTES
Per chart review, pt hospitalized from 2/26-3/14 with septic shock, decompensated cirrhosis, TERRY, and acute blood loss anemia in setting of recurrent epistaxis. Pt discharged to home with a consult for Physical Medicine and Rehabilitation. Pt has a hepatology appointment scheduled with [facility] on 3/30. Pt does not currently have a follow up appointment at the [facility]-hepatology clinic.     Attempted to reach patient for check in, no answer, message left requesting call back, number provided. Meme message also sent.

## 2022-03-22 NOTE — TELEPHONE ENCOUNTER
Spoke with Maddy RN, from [address] regarding pt's paracentesis orders. Notified Maddy that pt would need to schedule a follow up appointment with Sara Rahman PA-C prior to making any adjustments to current plan. Also reviewed that pt has been receiving most of her care through [facility] and pt can reach out to [facility] to discuss paracentesis orders if she does not want to schedule follow up with Sara. Maddy verbalized understanding of plan.

## 2022-05-21 ENCOUNTER — HEALTH MAINTENANCE LETTER (OUTPATIENT)
Age: 32
End: 2022-05-21

## 2022-07-29 DIAGNOSIS — K70.11 ALCOHOLIC HEPATITIS WITH ASCITES (H): ICD-10-CM

## 2022-07-29 RX ORDER — FUROSEMIDE 20 MG
40 TABLET ORAL DAILY
Qty: 180 TABLET | Refills: 1 | Status: SHIPPED | OUTPATIENT
Start: 2022-07-29 | End: 2022-12-05

## 2022-09-17 ENCOUNTER — HEALTH MAINTENANCE LETTER (OUTPATIENT)
Age: 32
End: 2022-09-17

## 2022-12-05 DIAGNOSIS — K70.11 ALCOHOLIC HEPATITIS WITH ASCITES (H): ICD-10-CM

## 2022-12-05 RX ORDER — FUROSEMIDE 20 MG
40 TABLET ORAL DAILY
Qty: 180 TABLET | Refills: 1 | Status: SHIPPED | OUTPATIENT
Start: 2022-12-05

## 2023-01-23 ENCOUNTER — HEALTH MAINTENANCE LETTER (OUTPATIENT)
Age: 33
End: 2023-01-23

## 2023-02-02 DIAGNOSIS — K70.31 ALCOHOLIC CIRRHOSIS OF LIVER WITH ASCITES (H): ICD-10-CM

## 2024-02-24 ENCOUNTER — HEALTH MAINTENANCE LETTER (OUTPATIENT)
Age: 34
End: 2024-02-24

## 2025-03-09 ENCOUNTER — HEALTH MAINTENANCE LETTER (OUTPATIENT)
Age: 35
End: 2025-03-09

## (undated) DEVICE — KIT ENDO TURNOVER/PROCEDURE CARRY-ON 101822

## (undated) DEVICE — SYR 30ML SLIP TIP W/O NDL 302833

## (undated) DEVICE — SOL WATER IRRIG 1000ML BOTTLE 2F7114

## (undated) DEVICE — ENDO BITE BLOCK ADULT OMNI-BLOC

## (undated) DEVICE — SUCTION MANIFOLD NEPTUNE 2 SYS 1 PORT 702-025-000

## (undated) DEVICE — KIT ENDO FIRST STEP DISINFECTANT 200ML W/POUCH EP-4

## (undated) DEVICE — TUBING SUCTION 12"X1/4" N612

## (undated) DEVICE — SUCTION CATH AIRLIFE TRI-FLO W/CONTROL PORT 14FR  T60C

## (undated) RX ORDER — DIPHENHYDRAMINE HYDROCHLORIDE 50 MG/ML
INJECTION INTRAMUSCULAR; INTRAVENOUS
Status: DISPENSED
Start: 2020-03-11

## (undated) RX ORDER — FENTANYL CITRATE 50 UG/ML
INJECTION, SOLUTION INTRAMUSCULAR; INTRAVENOUS
Status: DISPENSED
Start: 2020-03-11